# Patient Record
Sex: FEMALE | Race: WHITE | Employment: UNEMPLOYED | URBAN - METROPOLITAN AREA
[De-identification: names, ages, dates, MRNs, and addresses within clinical notes are randomized per-mention and may not be internally consistent; named-entity substitution may affect disease eponyms.]

---

## 2017-01-01 ENCOUNTER — APPOINTMENT (OUTPATIENT)
Dept: GENERAL RADIOLOGY | Age: 62
DRG: 291 | End: 2017-01-01
Attending: EMERGENCY MEDICINE
Payer: COMMERCIAL

## 2017-01-01 ENCOUNTER — APPOINTMENT (OUTPATIENT)
Dept: GENERAL RADIOLOGY | Age: 62
DRG: 291 | End: 2017-01-01
Attending: RADIOLOGY
Payer: COMMERCIAL

## 2017-01-01 ENCOUNTER — HOSPITAL ENCOUNTER (INPATIENT)
Age: 62
LOS: 9 days | DRG: 291 | End: 2017-10-23
Attending: EMERGENCY MEDICINE | Admitting: HOSPITALIST
Payer: COMMERCIAL

## 2017-01-01 ENCOUNTER — APPOINTMENT (OUTPATIENT)
Dept: GENERAL RADIOLOGY | Age: 62
DRG: 291 | End: 2017-01-01
Attending: INTERNAL MEDICINE
Payer: COMMERCIAL

## 2017-01-01 ENCOUNTER — APPOINTMENT (OUTPATIENT)
Dept: ULTRASOUND IMAGING | Age: 62
DRG: 291 | End: 2017-01-01
Attending: EMERGENCY MEDICINE
Payer: COMMERCIAL

## 2017-01-01 ENCOUNTER — APPOINTMENT (OUTPATIENT)
Dept: GENERAL RADIOLOGY | Age: 62
DRG: 291 | End: 2017-01-01
Attending: HOSPITALIST
Payer: COMMERCIAL

## 2017-01-01 ENCOUNTER — APPOINTMENT (OUTPATIENT)
Dept: NUCLEAR MEDICINE | Age: 62
DRG: 291 | End: 2017-01-01
Attending: FAMILY MEDICINE
Payer: COMMERCIAL

## 2017-01-01 ENCOUNTER — PATIENT OUTREACH (OUTPATIENT)
Dept: CARDIOLOGY CLINIC | Age: 62
End: 2017-01-01

## 2017-01-01 ENCOUNTER — APPOINTMENT (OUTPATIENT)
Dept: CT IMAGING | Age: 62
DRG: 291 | End: 2017-01-01
Attending: FAMILY MEDICINE
Payer: COMMERCIAL

## 2017-01-01 ENCOUNTER — APPOINTMENT (OUTPATIENT)
Dept: ULTRASOUND IMAGING | Age: 62
DRG: 291 | End: 2017-01-01
Attending: HOSPITALIST
Payer: COMMERCIAL

## 2017-01-01 ENCOUNTER — HOSPITAL ENCOUNTER (INPATIENT)
Age: 62
LOS: 6 days | Discharge: NURSING, REHAB OR LONG-TERM CARE | DRG: 291 | End: 2017-09-20
Attending: EMERGENCY MEDICINE | Admitting: FAMILY MEDICINE
Payer: COMMERCIAL

## 2017-01-01 ENCOUNTER — APPOINTMENT (OUTPATIENT)
Dept: INTERVENTIONAL RADIOLOGY/VASCULAR | Age: 62
DRG: 291 | End: 2017-01-01
Attending: INTERNAL MEDICINE
Payer: COMMERCIAL

## 2017-01-01 VITALS
TEMPERATURE: 94.4 F | WEIGHT: 293 LBS | SYSTOLIC BLOOD PRESSURE: 77 MMHG | HEIGHT: 67 IN | BODY MASS INDEX: 45.99 KG/M2 | OXYGEN SATURATION: 83 % | RESPIRATION RATE: 17 BRPM | DIASTOLIC BLOOD PRESSURE: 50 MMHG | HEART RATE: 62 BPM

## 2017-01-01 VITALS
BODY MASS INDEX: 45.99 KG/M2 | OXYGEN SATURATION: 94 % | TEMPERATURE: 96.9 F | DIASTOLIC BLOOD PRESSURE: 88 MMHG | SYSTOLIC BLOOD PRESSURE: 146 MMHG | RESPIRATION RATE: 21 BRPM | HEIGHT: 67 IN | HEART RATE: 60 BPM | WEIGHT: 293 LBS

## 2017-01-01 DIAGNOSIS — R06.02 SOB (SHORTNESS OF BREATH): ICD-10-CM

## 2017-01-01 DIAGNOSIS — J44.9 CHRONIC OBSTRUCTIVE PULMONARY DISEASE, UNSPECIFIED COPD TYPE (HCC): ICD-10-CM

## 2017-01-01 DIAGNOSIS — R53.83 FATIGUE, UNSPECIFIED TYPE: ICD-10-CM

## 2017-01-01 DIAGNOSIS — I42.9 CONGESTIVE HEART FAILURE WITH CARDIOMYOPATHY AND CARDIOMEGALY (HCC): ICD-10-CM

## 2017-01-01 DIAGNOSIS — I50.23 ACUTE ON CHRONIC SYSTOLIC CONGESTIVE HEART FAILURE (HCC): ICD-10-CM

## 2017-01-01 DIAGNOSIS — I50.30 DIASTOLIC CONGESTIVE HEART FAILURE, UNSPECIFIED CONGESTIVE HEART FAILURE CHRONICITY: Primary | ICD-10-CM

## 2017-01-01 DIAGNOSIS — I50.9 CONGESTIVE HEART FAILURE WITH CARDIOMYOPATHY AND CARDIOMEGALY (HCC): ICD-10-CM

## 2017-01-01 DIAGNOSIS — I47.9 PAROXYSMAL TACHYCARDIA (HCC): Primary | ICD-10-CM

## 2017-01-01 DIAGNOSIS — E66.2 MORBID OBESITY WITH ALVEOLAR HYPOVENTILATION (HCC): ICD-10-CM

## 2017-01-01 DIAGNOSIS — R53.81 PHYSICAL DEBILITY: ICD-10-CM

## 2017-01-01 DIAGNOSIS — I47.29 NON-SUSTAINED VENTRICULAR TACHYCARDIA: ICD-10-CM

## 2017-01-01 DIAGNOSIS — R18.8 OTHER ASCITES: ICD-10-CM

## 2017-01-01 DIAGNOSIS — F41.9 ANXIETY: ICD-10-CM

## 2017-01-01 DIAGNOSIS — I51.7 CONGESTIVE HEART FAILURE WITH CARDIOMYOPATHY AND CARDIOMEGALY (HCC): ICD-10-CM

## 2017-01-01 LAB
ALBUMIN SERPL-MCNC: 3 G/DL (ref 3.5–5)
ALBUMIN SERPL-MCNC: 3.1 G/DL (ref 3.5–5)
ALBUMIN SERPL-MCNC: 3.2 G/DL (ref 3.5–5)
ALBUMIN SERPL-MCNC: 3.6 G/DL (ref 3.5–5)
ALBUMIN SERPL-MCNC: 3.8 G/DL (ref 3.5–5)
ALBUMIN/GLOB SERPL: 0.9 {RATIO} (ref 1.1–2.2)
ALBUMIN/GLOB SERPL: 1 {RATIO} (ref 1.1–2.2)
ALP SERPL-CCNC: 27 U/L (ref 45–117)
ALP SERPL-CCNC: 29 U/L (ref 45–117)
ALP SERPL-CCNC: 32 U/L (ref 45–117)
ALP SERPL-CCNC: 32 U/L (ref 45–117)
ALP SERPL-CCNC: 35 U/L (ref 45–117)
ALT SERPL-CCNC: 20 U/L (ref 12–78)
ALT SERPL-CCNC: 21 U/L (ref 12–78)
ALT SERPL-CCNC: 27 U/L (ref 12–78)
ALT SERPL-CCNC: 29 U/L (ref 12–78)
ALT SERPL-CCNC: 34 U/L (ref 12–78)
ANION GAP SERPL CALC-SCNC: 10 MMOL/L (ref 5–15)
ANION GAP SERPL CALC-SCNC: 11 MMOL/L (ref 5–15)
ANION GAP SERPL CALC-SCNC: 11 MMOL/L (ref 5–15)
ANION GAP SERPL CALC-SCNC: 13 MMOL/L (ref 5–15)
ANION GAP SERPL CALC-SCNC: 13 MMOL/L (ref 5–15)
ANION GAP SERPL CALC-SCNC: 15 MMOL/L (ref 5–15)
ANION GAP SERPL CALC-SCNC: 6 MMOL/L (ref 5–15)
ANION GAP SERPL CALC-SCNC: 6 MMOL/L (ref 5–15)
ANION GAP SERPL CALC-SCNC: 7 MMOL/L (ref 5–15)
ANION GAP SERPL CALC-SCNC: 7 MMOL/L (ref 5–15)
ANION GAP SERPL CALC-SCNC: 8 MMOL/L (ref 5–15)
ANION GAP SERPL CALC-SCNC: 9 MMOL/L (ref 5–15)
ANION GAP SERPL CALC-SCNC: 9 MMOL/L (ref 5–15)
ARTERIAL PATENCY WRIST A: ABNORMAL
ARTERIAL PATENCY WRIST A: YES
AST SERPL-CCNC: 17 U/L (ref 15–37)
AST SERPL-CCNC: 19 U/L (ref 15–37)
AST SERPL-CCNC: 27 U/L (ref 15–37)
AST SERPL-CCNC: 33 U/L (ref 15–37)
AST SERPL-CCNC: 37 U/L (ref 15–37)
ATRIAL RATE: 127 BPM
ATRIAL RATE: 79 BPM
ATRIAL RATE: 93 BPM
BACTERIA SPEC CULT: NORMAL
BASE EXCESS BLD CALC-SCNC: 12 MMOL/L
BASE EXCESS BLD CALC-SCNC: 16 MMOL/L
BASE EXCESS BLD CALC-SCNC: 20 MMOL/L
BASE EXCESS BLD CALC-SCNC: 26 MMOL/L
BASE EXCESS BLD CALC-SCNC: 26 MMOL/L
BASOPHILS # BLD: 0 K/UL (ref 0–0.1)
BASOPHILS NFR BLD: 0 % (ref 0–1)
BDY SITE: ABNORMAL
BILIRUB SERPL-MCNC: 1.6 MG/DL (ref 0.2–1)
BILIRUB SERPL-MCNC: 1.7 MG/DL (ref 0.2–1)
BILIRUB SERPL-MCNC: 2.9 MG/DL (ref 0.2–1)
BILIRUB SERPL-MCNC: 3.7 MG/DL (ref 0.2–1)
BILIRUB SERPL-MCNC: 4.6 MG/DL (ref 0.2–1)
BNP SERPL-MCNC: 9210 PG/ML (ref 0–125)
BNP SERPL-MCNC: ABNORMAL PG/ML (ref 0–125)
BNP SERPL-MCNC: ABNORMAL PG/ML (ref 0–125)
BUN SERPL-MCNC: 104 MG/DL (ref 6–20)
BUN SERPL-MCNC: 113 MG/DL (ref 6–20)
BUN SERPL-MCNC: 117 MG/DL (ref 6–20)
BUN SERPL-MCNC: 125 MG/DL (ref 6–20)
BUN SERPL-MCNC: 37 MG/DL (ref 6–20)
BUN SERPL-MCNC: 38 MG/DL (ref 6–20)
BUN SERPL-MCNC: 43 MG/DL (ref 6–20)
BUN SERPL-MCNC: 47 MG/DL (ref 6–20)
BUN SERPL-MCNC: 50 MG/DL (ref 6–20)
BUN SERPL-MCNC: 52 MG/DL (ref 6–20)
BUN SERPL-MCNC: 56 MG/DL (ref 6–20)
BUN SERPL-MCNC: 58 MG/DL (ref 6–20)
BUN SERPL-MCNC: 66 MG/DL (ref 6–20)
BUN SERPL-MCNC: 68 MG/DL (ref 6–20)
BUN SERPL-MCNC: 70 MG/DL (ref 6–20)
BUN SERPL-MCNC: 85 MG/DL (ref 6–20)
BUN SERPL-MCNC: 99 MG/DL (ref 6–20)
BUN/CREAT SERPL: 20 (ref 12–20)
BUN/CREAT SERPL: 20 (ref 12–20)
BUN/CREAT SERPL: 22 (ref 12–20)
BUN/CREAT SERPL: 22 (ref 12–20)
BUN/CREAT SERPL: 23 (ref 12–20)
BUN/CREAT SERPL: 24 (ref 12–20)
BUN/CREAT SERPL: 26 (ref 12–20)
BUN/CREAT SERPL: 28 (ref 12–20)
BUN/CREAT SERPL: 29 (ref 12–20)
BUN/CREAT SERPL: 30 (ref 12–20)
BUN/CREAT SERPL: 31 (ref 12–20)
BUN/CREAT SERPL: 31 (ref 12–20)
BUN/CREAT SERPL: 34 (ref 12–20)
CALCIUM SERPL-MCNC: 8.3 MG/DL (ref 8.5–10.1)
CALCIUM SERPL-MCNC: 8.4 MG/DL (ref 8.5–10.1)
CALCIUM SERPL-MCNC: 8.5 MG/DL (ref 8.5–10.1)
CALCIUM SERPL-MCNC: 8.5 MG/DL (ref 8.5–10.1)
CALCIUM SERPL-MCNC: 8.6 MG/DL (ref 8.5–10.1)
CALCIUM SERPL-MCNC: 8.6 MG/DL (ref 8.5–10.1)
CALCIUM SERPL-MCNC: 8.7 MG/DL (ref 8.5–10.1)
CALCIUM SERPL-MCNC: 8.7 MG/DL (ref 8.5–10.1)
CALCIUM SERPL-MCNC: 8.8 MG/DL (ref 8.5–10.1)
CALCIUM SERPL-MCNC: 8.9 MG/DL (ref 8.5–10.1)
CALCIUM SERPL-MCNC: 9.1 MG/DL (ref 8.5–10.1)
CALCIUM SERPL-MCNC: 9.1 MG/DL (ref 8.5–10.1)
CALCIUM SERPL-MCNC: 9.2 MG/DL (ref 8.5–10.1)
CALCIUM SERPL-MCNC: 9.2 MG/DL (ref 8.5–10.1)
CALCIUM SERPL-MCNC: 9.3 MG/DL (ref 8.5–10.1)
CALCIUM SERPL-MCNC: 9.3 MG/DL (ref 8.5–10.1)
CALCIUM SERPL-MCNC: 9.4 MG/DL (ref 8.5–10.1)
CALCULATED P AXIS, ECG09: 103 DEGREES
CALCULATED P AXIS, ECG09: 54 DEGREES
CALCULATED R AXIS, ECG10: -136 DEGREES
CALCULATED R AXIS, ECG10: -62 DEGREES
CALCULATED R AXIS, ECG10: -84 DEGREES
CALCULATED T AXIS, ECG11: 101 DEGREES
CALCULATED T AXIS, ECG11: 65 DEGREES
CALCULATED T AXIS, ECG11: 95 DEGREES
CHLORIDE SERPL-SCNC: 87 MMOL/L (ref 97–108)
CHLORIDE SERPL-SCNC: 88 MMOL/L (ref 97–108)
CHLORIDE SERPL-SCNC: 90 MMOL/L (ref 97–108)
CHLORIDE SERPL-SCNC: 91 MMOL/L (ref 97–108)
CHLORIDE SERPL-SCNC: 92 MMOL/L (ref 97–108)
CHLORIDE SERPL-SCNC: 94 MMOL/L (ref 97–108)
CHLORIDE SERPL-SCNC: 96 MMOL/L (ref 97–108)
CHLORIDE SERPL-SCNC: 96 MMOL/L (ref 97–108)
CHLORIDE SERPL-SCNC: 97 MMOL/L (ref 97–108)
CHLORIDE SERPL-SCNC: 99 MMOL/L (ref 97–108)
CK SERPL-CCNC: 83 U/L (ref 26–192)
CO2 SERPL-SCNC: 26 MMOL/L (ref 21–32)
CO2 SERPL-SCNC: 29 MMOL/L (ref 21–32)
CO2 SERPL-SCNC: 33 MMOL/L (ref 21–32)
CO2 SERPL-SCNC: 34 MMOL/L (ref 21–32)
CO2 SERPL-SCNC: 34 MMOL/L (ref 21–32)
CO2 SERPL-SCNC: 36 MMOL/L (ref 21–32)
CO2 SERPL-SCNC: 37 MMOL/L (ref 21–32)
CO2 SERPL-SCNC: 38 MMOL/L (ref 21–32)
CO2 SERPL-SCNC: 39 MMOL/L (ref 21–32)
CO2 SERPL-SCNC: 44 MMOL/L (ref 21–32)
CORTIS SERPL-MCNC: 62.8 UG/DL
CREAT SERPL-MCNC: 1.53 MG/DL (ref 0.55–1.02)
CREAT SERPL-MCNC: 1.57 MG/DL (ref 0.55–1.02)
CREAT SERPL-MCNC: 1.61 MG/DL (ref 0.55–1.02)
CREAT SERPL-MCNC: 1.64 MG/DL (ref 0.55–1.02)
CREAT SERPL-MCNC: 1.72 MG/DL (ref 0.55–1.02)
CREAT SERPL-MCNC: 1.87 MG/DL (ref 0.55–1.02)
CREAT SERPL-MCNC: 1.88 MG/DL (ref 0.55–1.02)
CREAT SERPL-MCNC: 1.98 MG/DL (ref 0.55–1.02)
CREAT SERPL-MCNC: 2.06 MG/DL (ref 0.55–1.02)
CREAT SERPL-MCNC: 2.16 MG/DL (ref 0.55–1.02)
CREAT SERPL-MCNC: 2.79 MG/DL (ref 0.55–1.02)
CREAT SERPL-MCNC: 3.28 MG/DL (ref 0.55–1.02)
CREAT SERPL-MCNC: 4.13 MG/DL (ref 0.55–1.02)
CREAT SERPL-MCNC: 5.05 MG/DL (ref 0.55–1.02)
CREAT SERPL-MCNC: 5.08 MG/DL (ref 0.55–1.02)
CREAT SERPL-MCNC: 5.22 MG/DL (ref 0.55–1.02)
CREAT SERPL-MCNC: 6.17 MG/DL (ref 0.55–1.02)
DIAGNOSIS, 93000: NORMAL
DIFFERENTIAL METHOD BLD: ABNORMAL
EOSINOPHIL # BLD: 0 K/UL (ref 0–0.4)
EOSINOPHIL # BLD: 0.1 K/UL (ref 0–0.4)
EOSINOPHIL # BLD: 0.1 K/UL (ref 0–0.4)
EOSINOPHIL NFR BLD: 0 % (ref 0–7)
EOSINOPHIL NFR BLD: 1 % (ref 0–7)
EOSINOPHIL NFR BLD: 2 % (ref 0–7)
ERYTHROCYTE [DISTWIDTH] IN BLOOD BY AUTOMATED COUNT: 17.9 % (ref 11.5–14.5)
ERYTHROCYTE [DISTWIDTH] IN BLOOD BY AUTOMATED COUNT: 18 % (ref 11.5–14.5)
ERYTHROCYTE [DISTWIDTH] IN BLOOD BY AUTOMATED COUNT: 18.3 % (ref 11.5–14.5)
ERYTHROCYTE [DISTWIDTH] IN BLOOD BY AUTOMATED COUNT: 18.4 % (ref 11.5–14.5)
ERYTHROCYTE [DISTWIDTH] IN BLOOD BY AUTOMATED COUNT: 18.4 % (ref 11.5–14.5)
ERYTHROCYTE [DISTWIDTH] IN BLOOD BY AUTOMATED COUNT: 18.5 % (ref 11.5–14.5)
ERYTHROCYTE [DISTWIDTH] IN BLOOD BY AUTOMATED COUNT: 18.7 % (ref 11.5–14.5)
ERYTHROCYTE [DISTWIDTH] IN BLOOD BY AUTOMATED COUNT: 18.8 % (ref 11.5–14.5)
ERYTHROCYTE [DISTWIDTH] IN BLOOD BY AUTOMATED COUNT: 18.9 % (ref 11.5–14.5)
ERYTHROCYTE [DISTWIDTH] IN BLOOD BY AUTOMATED COUNT: 19.5 % (ref 11.5–14.5)
ERYTHROCYTE [DISTWIDTH] IN BLOOD BY AUTOMATED COUNT: 20.4 % (ref 11.5–14.5)
GAS FLOW.O2 O2 DELIVERY SYS: ABNORMAL L/MIN
GAS FLOW.O2 SETTING OXYMISER: 5 L/M
GLOBULIN SER CALC-MCNC: 3.4 G/DL (ref 2–4)
GLOBULIN SER CALC-MCNC: 3.6 G/DL (ref 2–4)
GLOBULIN SER CALC-MCNC: 3.6 G/DL (ref 2–4)
GLOBULIN SER CALC-MCNC: 3.7 G/DL (ref 2–4)
GLOBULIN SER CALC-MCNC: 4.3 G/DL (ref 2–4)
GLUCOSE BLD STRIP.AUTO-MCNC: 124 MG/DL (ref 65–100)
GLUCOSE BLD STRIP.AUTO-MCNC: 87 MG/DL (ref 65–100)
GLUCOSE SERPL-MCNC: 110 MG/DL (ref 65–100)
GLUCOSE SERPL-MCNC: 110 MG/DL (ref 65–100)
GLUCOSE SERPL-MCNC: 117 MG/DL (ref 65–100)
GLUCOSE SERPL-MCNC: 117 MG/DL (ref 65–100)
GLUCOSE SERPL-MCNC: 120 MG/DL (ref 65–100)
GLUCOSE SERPL-MCNC: 125 MG/DL (ref 65–100)
GLUCOSE SERPL-MCNC: 127 MG/DL (ref 65–100)
GLUCOSE SERPL-MCNC: 132 MG/DL (ref 65–100)
GLUCOSE SERPL-MCNC: 133 MG/DL (ref 65–100)
GLUCOSE SERPL-MCNC: 137 MG/DL (ref 65–100)
GLUCOSE SERPL-MCNC: 138 MG/DL (ref 65–100)
GLUCOSE SERPL-MCNC: 138 MG/DL (ref 65–100)
GLUCOSE SERPL-MCNC: 139 MG/DL (ref 65–100)
GLUCOSE SERPL-MCNC: 144 MG/DL (ref 65–100)
GLUCOSE SERPL-MCNC: 146 MG/DL (ref 65–100)
GLUCOSE SERPL-MCNC: 93 MG/DL (ref 65–100)
GLUCOSE SERPL-MCNC: 95 MG/DL (ref 65–100)
HBV SURFACE AG SER QL: <0.1 INDEX
HBV SURFACE AG SER QL: NEGATIVE
HCO3 BLD-SCNC: 37.2 MMOL/L (ref 22–26)
HCO3 BLD-SCNC: 40.8 MMOL/L (ref 22–26)
HCO3 BLD-SCNC: 44.7 MMOL/L (ref 22–26)
HCO3 BLD-SCNC: 49.4 MMOL/L (ref 22–26)
HCO3 BLD-SCNC: 50 MMOL/L (ref 22–26)
HCT VFR BLD AUTO: 35.1 % (ref 35–47)
HCT VFR BLD AUTO: 37.8 % (ref 35–47)
HCT VFR BLD AUTO: 38.1 % (ref 35–47)
HCT VFR BLD AUTO: 38.8 % (ref 35–47)
HCT VFR BLD AUTO: 39 % (ref 35–47)
HCT VFR BLD AUTO: 39.6 % (ref 35–47)
HCT VFR BLD AUTO: 39.6 % (ref 35–47)
HCT VFR BLD AUTO: 41.1 % (ref 35–47)
HCT VFR BLD AUTO: 41.5 % (ref 35–47)
HCT VFR BLD AUTO: 42.1 % (ref 35–47)
HCT VFR BLD AUTO: 45.1 % (ref 35–47)
HGB BLD-MCNC: 11.9 G/DL (ref 11.5–16)
HGB BLD-MCNC: 12 G/DL (ref 11.5–16)
HGB BLD-MCNC: 12.1 G/DL (ref 11.5–16)
HGB BLD-MCNC: 12.2 G/DL (ref 11.5–16)
HGB BLD-MCNC: 12.5 G/DL (ref 11.5–16)
HGB BLD-MCNC: 12.9 G/DL (ref 11.5–16)
HGB BLD-MCNC: 12.9 G/DL (ref 11.5–16)
HGB BLD-MCNC: 13 G/DL (ref 11.5–16)
HGB BLD-MCNC: 13 G/DL (ref 11.5–16)
HGB BLD-MCNC: 13.1 G/DL (ref 11.5–16)
HGB BLD-MCNC: 14.1 G/DL (ref 11.5–16)
LACTATE SERPL-SCNC: 1.4 MMOL/L (ref 0.4–2)
LACTATE SERPL-SCNC: 1.5 MMOL/L (ref 0.4–2)
LACTATE SERPL-SCNC: 2.8 MMOL/L (ref 0.4–2)
LYMPHOCYTES # BLD: 0.2 K/UL (ref 0.8–3.5)
LYMPHOCYTES # BLD: 0.2 K/UL (ref 0.8–3.5)
LYMPHOCYTES # BLD: 0.3 K/UL (ref 0.8–3.5)
LYMPHOCYTES # BLD: 0.3 K/UL (ref 0.8–3.5)
LYMPHOCYTES # BLD: 0.7 K/UL (ref 0.8–3.5)
LYMPHOCYTES # BLD: 1.2 K/UL (ref 0.8–3.5)
LYMPHOCYTES NFR BLD: 11 % (ref 12–49)
LYMPHOCYTES NFR BLD: 16 % (ref 12–49)
LYMPHOCYTES NFR BLD: 4 % (ref 12–49)
LYMPHOCYTES NFR BLD: 4 % (ref 12–49)
LYMPHOCYTES NFR BLD: 5 % (ref 12–49)
LYMPHOCYTES NFR BLD: 5 % (ref 12–49)
MAGNESIUM SERPL-MCNC: 1.9 MG/DL (ref 1.6–2.4)
MAGNESIUM SERPL-MCNC: 2.3 MG/DL (ref 1.6–2.4)
MAGNESIUM SERPL-MCNC: 2.4 MG/DL (ref 1.6–2.4)
MAGNESIUM SERPL-MCNC: 2.5 MG/DL (ref 1.6–2.4)
MAGNESIUM SERPL-MCNC: 2.6 MG/DL (ref 1.6–2.4)
MCH RBC QN AUTO: 29.7 PG (ref 26–34)
MCH RBC QN AUTO: 29.8 PG (ref 26–34)
MCH RBC QN AUTO: 29.9 PG (ref 26–34)
MCH RBC QN AUTO: 30 PG (ref 26–34)
MCH RBC QN AUTO: 30.2 PG (ref 26–34)
MCH RBC QN AUTO: 30.2 PG (ref 26–34)
MCH RBC QN AUTO: 30.6 PG (ref 26–34)
MCH RBC QN AUTO: 30.6 PG (ref 26–34)
MCH RBC QN AUTO: 30.8 PG (ref 26–34)
MCH RBC QN AUTO: 31.1 PG (ref 26–34)
MCH RBC QN AUTO: 31.2 PG (ref 26–34)
MCHC RBC AUTO-ENTMCNC: 30.9 G/DL (ref 30–36.5)
MCHC RBC AUTO-ENTMCNC: 31.2 G/DL (ref 30–36.5)
MCHC RBC AUTO-ENTMCNC: 31.2 G/DL (ref 30–36.5)
MCHC RBC AUTO-ENTMCNC: 31.3 G/DL (ref 30–36.5)
MCHC RBC AUTO-ENTMCNC: 31.4 G/DL (ref 30–36.5)
MCHC RBC AUTO-ENTMCNC: 31.6 G/DL (ref 30–36.5)
MCHC RBC AUTO-ENTMCNC: 31.6 G/DL (ref 30–36.5)
MCHC RBC AUTO-ENTMCNC: 31.7 G/DL (ref 30–36.5)
MCHC RBC AUTO-ENTMCNC: 32.6 G/DL (ref 30–36.5)
MCHC RBC AUTO-ENTMCNC: 33.3 G/DL (ref 30–36.5)
MCHC RBC AUTO-ENTMCNC: 34.8 G/DL (ref 30–36.5)
MCV RBC AUTO: 89.8 FL (ref 80–99)
MCV RBC AUTO: 90.7 FL (ref 80–99)
MCV RBC AUTO: 94.7 FL (ref 80–99)
MCV RBC AUTO: 95 FL (ref 80–99)
MCV RBC AUTO: 95.2 FL (ref 80–99)
MCV RBC AUTO: 95.4 FL (ref 80–99)
MCV RBC AUTO: 95.6 FL (ref 80–99)
MCV RBC AUTO: 95.7 FL (ref 80–99)
MCV RBC AUTO: 97.4 FL (ref 80–99)
MCV RBC AUTO: 97.8 FL (ref 80–99)
MCV RBC AUTO: 99.1 FL (ref 80–99)
MONOCYTES # BLD: 0.2 K/UL (ref 0–1)
MONOCYTES # BLD: 0.3 K/UL (ref 0–1)
MONOCYTES # BLD: 0.8 K/UL (ref 0–1)
MONOCYTES NFR BLD: 11 % (ref 5–13)
MONOCYTES NFR BLD: 3 % (ref 5–13)
MONOCYTES NFR BLD: 5 % (ref 5–13)
MONOCYTES NFR BLD: 5 % (ref 5–13)
MONOCYTES NFR BLD: 6 % (ref 5–13)
MONOCYTES NFR BLD: 6 % (ref 5–13)
NEUTS BAND NFR BLD MANUAL: 7 % (ref 0–6)
NEUTS SEG # BLD: 4.7 K/UL (ref 1.8–8)
NEUTS SEG # BLD: 5 K/UL (ref 1.8–8)
NEUTS SEG # BLD: 5.2 K/UL (ref 1.8–8)
NEUTS SEG # BLD: 5.6 K/UL (ref 1.8–8)
NEUTS SEG # BLD: 5.7 K/UL (ref 1.8–8)
NEUTS SEG # BLD: 5.7 K/UL (ref 1.8–8)
NEUTS SEG NFR BLD: 72 % (ref 32–75)
NEUTS SEG NFR BLD: 82 % (ref 32–75)
NEUTS SEG NFR BLD: 85 % (ref 32–75)
NEUTS SEG NFR BLD: 89 % (ref 32–75)
NEUTS SEG NFR BLD: 90 % (ref 32–75)
NEUTS SEG NFR BLD: 91 % (ref 32–75)
NRBC # BLD: 0.61 K/UL (ref 0–0.01)
NRBC # BLD: 0.65 K/UL (ref 0–0.01)
NRBC BLD-RTO: 7 PER 100 WBC
NRBC BLD-RTO: 9.9 PER 100 WBC
O2/TOTAL GAS SETTING VFR VENT: 50 %
P-R INTERVAL, ECG05: 198 MS
P-R INTERVAL, ECG05: 294 MS
PCO2 BLD: 64.9 MMHG (ref 35–45)
PCO2 BLD: 67.2 MMHG (ref 35–45)
PCO2 BLD: 68.6 MMHG (ref 35–45)
PCO2 BLD: 69.2 MMHG (ref 35–45)
PCO2 BLD: 73.7 MMHG (ref 35–45)
PEEP RESPIRATORY: 6 CMH2O
PH BLD: 7.37 [PH] (ref 7.35–7.45)
PH BLD: 7.38 [PH] (ref 7.35–7.45)
PH BLD: 7.39 [PH] (ref 7.35–7.45)
PH BLD: 7.47 [PH] (ref 7.35–7.45)
PH BLD: 7.47 [PH] (ref 7.35–7.45)
PHOSPHATE SERPL-MCNC: 4.5 MG/DL (ref 2.6–4.7)
PHOSPHATE SERPL-MCNC: 6.2 MG/DL (ref 2.6–4.7)
PHOSPHATE SERPL-MCNC: 6.3 MG/DL (ref 2.6–4.7)
PHOSPHATE SERPL-MCNC: 6.4 MG/DL (ref 2.6–4.7)
PHOSPHATE SERPL-MCNC: 7 MG/DL (ref 2.6–4.7)
PHOSPHATE SERPL-MCNC: 7.4 MG/DL (ref 2.6–4.7)
PIP ISTAT,IPIP: 12
PIP ISTAT,IPIP: 13
PLATELET # BLD AUTO: 125 K/UL (ref 150–400)
PLATELET # BLD AUTO: 143 K/UL (ref 150–400)
PLATELET # BLD AUTO: 143 K/UL (ref 150–400)
PLATELET # BLD AUTO: 146 K/UL (ref 150–400)
PLATELET # BLD AUTO: 151 K/UL (ref 150–400)
PLATELET # BLD AUTO: 169 K/UL (ref 150–400)
PLATELET # BLD AUTO: 172 K/UL (ref 150–400)
PLATELET # BLD AUTO: 173 K/UL (ref 150–400)
PLATELET # BLD AUTO: 185 K/UL (ref 150–400)
PLATELET # BLD AUTO: 194 K/UL (ref 150–400)
PLATELET # BLD AUTO: 93 K/UL (ref 150–400)
PO2 BLD: 110 MMHG (ref 80–100)
PO2 BLD: 64 MMHG (ref 80–100)
PO2 BLD: 75 MMHG (ref 80–100)
PO2 BLD: 81 MMHG (ref 80–100)
PO2 BLD: 92 MMHG (ref 80–100)
POTASSIUM SERPL-SCNC: 2.9 MMOL/L (ref 3.5–5.1)
POTASSIUM SERPL-SCNC: 3.4 MMOL/L (ref 3.5–5.1)
POTASSIUM SERPL-SCNC: 3.5 MMOL/L (ref 3.5–5.1)
POTASSIUM SERPL-SCNC: 3.8 MMOL/L (ref 3.5–5.1)
POTASSIUM SERPL-SCNC: 3.9 MMOL/L (ref 3.5–5.1)
POTASSIUM SERPL-SCNC: 4.1 MMOL/L (ref 3.5–5.1)
POTASSIUM SERPL-SCNC: 4.2 MMOL/L (ref 3.5–5.1)
POTASSIUM SERPL-SCNC: 4.5 MMOL/L (ref 3.5–5.1)
POTASSIUM SERPL-SCNC: 4.5 MMOL/L (ref 3.5–5.1)
POTASSIUM SERPL-SCNC: 4.6 MMOL/L (ref 3.5–5.1)
POTASSIUM SERPL-SCNC: 4.6 MMOL/L (ref 3.5–5.1)
POTASSIUM SERPL-SCNC: 4.7 MMOL/L (ref 3.5–5.1)
POTASSIUM SERPL-SCNC: 4.9 MMOL/L (ref 3.5–5.1)
POTASSIUM SERPL-SCNC: 5.2 MMOL/L (ref 3.5–5.1)
POTASSIUM SERPL-SCNC: 5.2 MMOL/L (ref 3.5–5.1)
POTASSIUM SERPL-SCNC: 5.3 MMOL/L (ref 3.5–5.1)
POTASSIUM SERPL-SCNC: 5.6 MMOL/L (ref 3.5–5.1)
PRESSURE SUPPORT SETTING VENT: 12 CMH2O
PRESSURE SUPPORT SETTING VENT: 6 CMH2O
PRESSURE SUPPORT SETTING VENT: 6 CMH2O
PROT SERPL-MCNC: 6.4 G/DL (ref 6.4–8.2)
PROT SERPL-MCNC: 6.7 G/DL (ref 6.4–8.2)
PROT SERPL-MCNC: 6.9 G/DL (ref 6.4–8.2)
PROT SERPL-MCNC: 7.2 G/DL (ref 6.4–8.2)
PROT SERPL-MCNC: 8.1 G/DL (ref 6.4–8.2)
Q-T INTERVAL, ECG07: 364 MS
Q-T INTERVAL, ECG07: 400 MS
Q-T INTERVAL, ECG07: 454 MS
QRS DURATION, ECG06: 148 MS
QRS DURATION, ECG06: 164 MS
QRS DURATION, ECG06: 188 MS
QTC CALCULATION (BEZET), ECG08: 497 MS
QTC CALCULATION (BEZET), ECG08: 517 MS
QTC CALCULATION (BEZET), ECG08: 518 MS
RBC # BLD AUTO: 3.91 M/UL (ref 3.8–5.2)
RBC # BLD AUTO: 3.98 M/UL (ref 3.8–5.2)
RBC # BLD AUTO: 3.98 M/UL (ref 3.8–5.2)
RBC # BLD AUTO: 4.06 M/UL (ref 3.8–5.2)
RBC # BLD AUTO: 4.15 M/UL (ref 3.8–5.2)
RBC # BLD AUTO: 4.16 M/UL (ref 3.8–5.2)
RBC # BLD AUTO: 4.25 M/UL (ref 3.8–5.2)
RBC # BLD AUTO: 4.26 M/UL (ref 3.8–5.2)
RBC # BLD AUTO: 4.3 M/UL (ref 3.8–5.2)
RBC # BLD AUTO: 4.34 M/UL (ref 3.8–5.2)
RBC # BLD AUTO: 4.61 M/UL (ref 3.8–5.2)
RBC MORPH BLD: ABNORMAL
SAO2 % BLD: 90 % (ref 92–97)
SAO2 % BLD: 94 % (ref 92–97)
SAO2 % BLD: 95 % (ref 92–97)
SAO2 % BLD: 97 % (ref 92–97)
SAO2 % BLD: 98 % (ref 92–97)
SERVICE CMNT-IMP: 50 L/MIN
SERVICE CMNT-IMP: ABNORMAL
SERVICE CMNT-IMP: NORMAL
SERVICE CMNT-IMP: NORMAL
SODIUM SERPL-SCNC: 131 MMOL/L (ref 136–145)
SODIUM SERPL-SCNC: 133 MMOL/L (ref 136–145)
SODIUM SERPL-SCNC: 138 MMOL/L (ref 136–145)
SODIUM SERPL-SCNC: 139 MMOL/L (ref 136–145)
SODIUM SERPL-SCNC: 140 MMOL/L (ref 136–145)
SODIUM SERPL-SCNC: 141 MMOL/L (ref 136–145)
SODIUM SERPL-SCNC: 143 MMOL/L (ref 136–145)
SPECIMEN TYPE: ABNORMAL
SPONTANEOUS TIMED, IST: YES
SPONTANEOUS TIMED, IST: YES
TOTAL RESP. RATE, ITRR: 26
TROPONIN I SERPL-MCNC: 0.06 NG/ML
TROPONIN I SERPL-MCNC: 0.07 NG/ML
TSH SERPL DL<=0.05 MIU/L-ACNC: 0.51 UIU/ML (ref 0.36–3.74)
VANCOMYCIN SERPL-MCNC: 17.8 UG/ML
VENTRICULAR RATE, ECG03: 122 BPM
VENTRICULAR RATE, ECG03: 78 BPM
VENTRICULAR RATE, ECG03: 93 BPM
WBC # BLD AUTO: 5.2 K/UL (ref 3.6–11)
WBC # BLD AUTO: 5.3 K/UL (ref 3.6–11)
WBC # BLD AUTO: 5.5 K/UL (ref 3.6–11)
WBC # BLD AUTO: 5.7 K/UL (ref 3.6–11)
WBC # BLD AUTO: 6.1 K/UL (ref 3.6–11)
WBC # BLD AUTO: 6.2 K/UL (ref 3.6–11)
WBC # BLD AUTO: 6.8 K/UL (ref 3.6–11)
WBC # BLD AUTO: 7.7 K/UL (ref 3.6–11)
WBC # BLD AUTO: 8.3 K/UL (ref 3.6–11)
WBC # BLD AUTO: 8.9 K/UL (ref 3.6–11)
WBC # BLD AUTO: 9.3 K/UL (ref 3.6–11)
WBC NRBC COR # BLD: ABNORMAL 10*3/UL
WBC NRBC COR # BLD: ABNORMAL 10*3/UL

## 2017-01-01 PROCEDURE — 36415 COLL VENOUS BLD VENIPUNCTURE: CPT | Performed by: FAMILY MEDICINE

## 2017-01-01 PROCEDURE — 77030013033 HC MSK BPAP/CPAP MMKA -B

## 2017-01-01 PROCEDURE — 74011250636 HC RX REV CODE- 250/636: Performed by: HOSPITALIST

## 2017-01-01 PROCEDURE — 83735 ASSAY OF MAGNESIUM: CPT | Performed by: HOSPITALIST

## 2017-01-01 PROCEDURE — 74011250636 HC RX REV CODE- 250/636: Performed by: INTERNAL MEDICINE

## 2017-01-01 PROCEDURE — 74011250636 HC RX REV CODE- 250/636: Performed by: FAMILY MEDICINE

## 2017-01-01 PROCEDURE — 51702 INSERT TEMP BLADDER CATH: CPT

## 2017-01-01 PROCEDURE — 84100 ASSAY OF PHOSPHORUS: CPT | Performed by: HOSPITALIST

## 2017-01-01 PROCEDURE — C1892 INTRO/SHEATH,FIXED,PEEL-AWAY: HCPCS

## 2017-01-01 PROCEDURE — 74011250637 HC RX REV CODE- 250/637: Performed by: HOSPITALIST

## 2017-01-01 PROCEDURE — G8988 SELF CARE GOAL STATUS: HCPCS

## 2017-01-01 PROCEDURE — 82550 ASSAY OF CK (CPK): CPT | Performed by: EMERGENCY MEDICINE

## 2017-01-01 PROCEDURE — 77030011256 HC DRSG MEPILEX <16IN NO BORD MOLN -A

## 2017-01-01 PROCEDURE — 74011000258 HC RX REV CODE- 258: Performed by: HOSPITALIST

## 2017-01-01 PROCEDURE — 74011000250 HC RX REV CODE- 250: Performed by: HOSPITALIST

## 2017-01-01 PROCEDURE — 74011250637 HC RX REV CODE- 250/637: Performed by: INTERNAL MEDICINE

## 2017-01-01 PROCEDURE — G8979 MOBILITY GOAL STATUS: HCPCS

## 2017-01-01 PROCEDURE — 94640 AIRWAY INHALATION TREATMENT: CPT

## 2017-01-01 PROCEDURE — 65660000001 HC RM ICU INTERMED STEPDOWN

## 2017-01-01 PROCEDURE — 36415 COLL VENOUS BLD VENIPUNCTURE: CPT | Performed by: EMERGENCY MEDICINE

## 2017-01-01 PROCEDURE — 80048 BASIC METABOLIC PNL TOTAL CA: CPT | Performed by: HOSPITALIST

## 2017-01-01 PROCEDURE — 74011000250 HC RX REV CODE- 250: Performed by: FAMILY MEDICINE

## 2017-01-01 PROCEDURE — 87040 BLOOD CULTURE FOR BACTERIA: CPT | Performed by: HOSPITALIST

## 2017-01-01 PROCEDURE — 83880 ASSAY OF NATRIURETIC PEPTIDE: CPT | Performed by: INTERNAL MEDICINE

## 2017-01-01 PROCEDURE — 36415 COLL VENOUS BLD VENIPUNCTURE: CPT | Performed by: HOSPITALIST

## 2017-01-01 PROCEDURE — 80053 COMPREHEN METABOLIC PANEL: CPT | Performed by: FAMILY MEDICINE

## 2017-01-01 PROCEDURE — 84484 ASSAY OF TROPONIN QUANT: CPT | Performed by: SPECIALIST

## 2017-01-01 PROCEDURE — P9047 ALBUMIN (HUMAN), 25%, 50ML: HCPCS | Performed by: INTERNAL MEDICINE

## 2017-01-01 PROCEDURE — 74011250636 HC RX REV CODE- 250/636: Performed by: PHYSICIAN ASSISTANT

## 2017-01-01 PROCEDURE — 71010 XR CHEST PORT: CPT

## 2017-01-01 PROCEDURE — 74011250637 HC RX REV CODE- 250/637: Performed by: FAMILY MEDICINE

## 2017-01-01 PROCEDURE — 84100 ASSAY OF PHOSPHORUS: CPT | Performed by: INTERNAL MEDICINE

## 2017-01-01 PROCEDURE — 94660 CPAP INITIATION&MGMT: CPT

## 2017-01-01 PROCEDURE — 74176 CT ABD & PELVIS W/O CONTRAST: CPT

## 2017-01-01 PROCEDURE — 85025 COMPLETE CBC W/AUTO DIFF WBC: CPT | Performed by: INTERNAL MEDICINE

## 2017-01-01 PROCEDURE — 85025 COMPLETE CBC W/AUTO DIFF WBC: CPT | Performed by: EMERGENCY MEDICINE

## 2017-01-01 PROCEDURE — G8987 SELF CARE CURRENT STATUS: HCPCS

## 2017-01-01 PROCEDURE — 84484 ASSAY OF TROPONIN QUANT: CPT | Performed by: EMERGENCY MEDICINE

## 2017-01-01 PROCEDURE — 80202 ASSAY OF VANCOMYCIN: CPT | Performed by: HOSPITALIST

## 2017-01-01 PROCEDURE — 77010033678 HC OXYGEN DAILY

## 2017-01-01 PROCEDURE — A9558 XE133 XENON 10MCI: HCPCS

## 2017-01-01 PROCEDURE — 97530 THERAPEUTIC ACTIVITIES: CPT

## 2017-01-01 PROCEDURE — 76770 US EXAM ABDO BACK WALL COMP: CPT

## 2017-01-01 PROCEDURE — 74011000250 HC RX REV CODE- 250: Performed by: INTERNAL MEDICINE

## 2017-01-01 PROCEDURE — 94761 N-INVAS EAR/PLS OXIMETRY MLT: CPT

## 2017-01-01 PROCEDURE — 77030029684 HC NEB SM VOL KT MONA -A

## 2017-01-01 PROCEDURE — 85025 COMPLETE CBC W/AUTO DIFF WBC: CPT | Performed by: HOSPITALIST

## 2017-01-01 PROCEDURE — 94664 DEMO&/EVAL PT USE INHALER: CPT

## 2017-01-01 PROCEDURE — 76450000000

## 2017-01-01 PROCEDURE — 77030018719 HC DRSG PTCH ANTIMIC J&J -A

## 2017-01-01 PROCEDURE — 83605 ASSAY OF LACTIC ACID: CPT | Performed by: HOSPITALIST

## 2017-01-01 PROCEDURE — 36600 WITHDRAWAL OF ARTERIAL BLOOD: CPT

## 2017-01-01 PROCEDURE — 74011636637 HC RX REV CODE- 636/637: Performed by: HOSPITALIST

## 2017-01-01 PROCEDURE — 74011000250 HC RX REV CODE- 250: Performed by: PHYSICIAN ASSISTANT

## 2017-01-01 PROCEDURE — 85025 COMPLETE CBC W/AUTO DIFF WBC: CPT | Performed by: FAMILY MEDICINE

## 2017-01-01 PROCEDURE — 82803 BLOOD GASES ANY COMBINATION: CPT

## 2017-01-01 PROCEDURE — 85027 COMPLETE CBC AUTOMATED: CPT | Performed by: HOSPITALIST

## 2017-01-01 PROCEDURE — 74011250636 HC RX REV CODE- 250/636: Performed by: EMERGENCY MEDICINE

## 2017-01-01 PROCEDURE — 74011250637 HC RX REV CODE- 250/637: Performed by: EMERGENCY MEDICINE

## 2017-01-01 PROCEDURE — 80048 BASIC METABOLIC PNL TOTAL CA: CPT | Performed by: INTERNAL MEDICINE

## 2017-01-01 PROCEDURE — 65660000000 HC RM CCU STEPDOWN

## 2017-01-01 PROCEDURE — 74011250637 HC RX REV CODE- 250/637: Performed by: SPECIALIST

## 2017-01-01 PROCEDURE — 82962 GLUCOSE BLOOD TEST: CPT

## 2017-01-01 PROCEDURE — 94762 N-INVAS EAR/PLS OXIMTRY CONT: CPT

## 2017-01-01 PROCEDURE — 93005 ELECTROCARDIOGRAM TRACING: CPT

## 2017-01-01 PROCEDURE — 65610000006 HC RM INTENSIVE CARE

## 2017-01-01 PROCEDURE — 80053 COMPREHEN METABOLIC PANEL: CPT | Performed by: EMERGENCY MEDICINE

## 2017-01-01 PROCEDURE — G8978 MOBILITY CURRENT STATUS: HCPCS

## 2017-01-01 PROCEDURE — 36556 INSERT NON-TUNNEL CV CATH: CPT

## 2017-01-01 PROCEDURE — 87340 HEPATITIS B SURFACE AG IA: CPT | Performed by: INTERNAL MEDICINE

## 2017-01-01 PROCEDURE — 36415 COLL VENOUS BLD VENIPUNCTURE: CPT | Performed by: INTERNAL MEDICINE

## 2017-01-01 PROCEDURE — 97165 OT EVAL LOW COMPLEX 30 MIN: CPT

## 2017-01-01 PROCEDURE — 74011250637 HC RX REV CODE- 250/637: Performed by: PHYSICIAN ASSISTANT

## 2017-01-01 PROCEDURE — 87040 BLOOD CULTURE FOR BACTERIA: CPT | Performed by: EMERGENCY MEDICINE

## 2017-01-01 PROCEDURE — 5A2204Z RESTORATION OF CARDIAC RHYTHM, SINGLE: ICD-10-PCS | Performed by: HOSPITALIST

## 2017-01-01 PROCEDURE — 51798 US URINE CAPACITY MEASURE: CPT

## 2017-01-01 PROCEDURE — 83880 ASSAY OF NATRIURETIC PEPTIDE: CPT | Performed by: HOSPITALIST

## 2017-01-01 PROCEDURE — 97161 PT EVAL LOW COMPLEX 20 MIN: CPT

## 2017-01-01 PROCEDURE — 85027 COMPLETE CBC AUTOMATED: CPT | Performed by: INTERNAL MEDICINE

## 2017-01-01 PROCEDURE — 74011000258 HC RX REV CODE- 258: Performed by: EMERGENCY MEDICINE

## 2017-01-01 PROCEDURE — C1752 CATH,HEMODIALYSIS,SHORT-TERM: HCPCS

## 2017-01-01 PROCEDURE — 90935 HEMODIALYSIS ONE EVALUATION: CPT

## 2017-01-01 PROCEDURE — 77030013140 HC MSK NEB VYRM -A

## 2017-01-01 PROCEDURE — 74011000250 HC RX REV CODE- 250: Performed by: RADIOLOGY

## 2017-01-01 PROCEDURE — 80053 COMPREHEN METABOLIC PANEL: CPT | Performed by: HOSPITALIST

## 2017-01-01 PROCEDURE — 84443 ASSAY THYROID STIM HORMONE: CPT | Performed by: PHYSICIAN ASSISTANT

## 2017-01-01 PROCEDURE — 05HM33Z INSERTION OF INFUSION DEVICE INTO RIGHT INTERNAL JUGULAR VEIN, PERCUTANEOUS APPROACH: ICD-10-PCS | Performed by: RADIOLOGY

## 2017-01-01 PROCEDURE — 74011000250 HC RX REV CODE- 250: Performed by: EMERGENCY MEDICINE

## 2017-01-01 PROCEDURE — 76705 ECHO EXAM OF ABDOMEN: CPT

## 2017-01-01 PROCEDURE — 83735 ASSAY OF MAGNESIUM: CPT | Performed by: EMERGENCY MEDICINE

## 2017-01-01 PROCEDURE — 77030005514 HC CATH URETH FOL14 BARD -A

## 2017-01-01 PROCEDURE — 99285 EMERGENCY DEPT VISIT HI MDM: CPT

## 2017-01-01 PROCEDURE — 83735 ASSAY OF MAGNESIUM: CPT | Performed by: INTERNAL MEDICINE

## 2017-01-01 PROCEDURE — 93306 TTE W/DOPPLER COMPLETE: CPT

## 2017-01-01 PROCEDURE — 5A12012 PERFORMANCE OF CARDIAC OUTPUT, SINGLE, MANUAL: ICD-10-PCS | Performed by: HOSPITALIST

## 2017-01-01 PROCEDURE — P9045 ALBUMIN (HUMAN), 5%, 250 ML: HCPCS | Performed by: INTERNAL MEDICINE

## 2017-01-01 PROCEDURE — 83880 ASSAY OF NATRIURETIC PEPTIDE: CPT | Performed by: PHYSICIAN ASSISTANT

## 2017-01-01 PROCEDURE — 77030012879 HC MSK CPAP FLL FAC PHIL -B

## 2017-01-01 PROCEDURE — 97535 SELF CARE MNGMENT TRAINING: CPT

## 2017-01-01 PROCEDURE — 77030002996 HC SUT SLK J&J -A

## 2017-01-01 PROCEDURE — 77030011255 HC DSG AQUACEL AG BMS -A

## 2017-01-01 PROCEDURE — 74011250636 HC RX REV CODE- 250/636: Performed by: RADIOLOGY

## 2017-01-01 PROCEDURE — 82533 TOTAL CORTISOL: CPT | Performed by: HOSPITALIST

## 2017-01-01 PROCEDURE — 83605 ASSAY OF LACTIC ACID: CPT | Performed by: EMERGENCY MEDICINE

## 2017-01-01 RX ORDER — POTASSIUM CHLORIDE 1500 MG/1
20 TABLET, FILM COATED, EXTENDED RELEASE ORAL 2 TIMES DAILY
Qty: 60 TAB | Refills: 1 | Status: SHIPPED
Start: 2017-01-01

## 2017-01-01 RX ORDER — LEVOFLOXACIN 5 MG/ML
500 INJECTION, SOLUTION INTRAVENOUS
Status: DISCONTINUED | OUTPATIENT
Start: 2017-01-01 | End: 2017-01-01 | Stop reason: DRUGHIGH

## 2017-01-01 RX ORDER — BUDESONIDE 0.5 MG/2ML
500 INHALANT ORAL
Status: DISCONTINUED | OUTPATIENT
Start: 2017-01-01 | End: 2017-01-01 | Stop reason: HOSPADM

## 2017-01-01 RX ORDER — POTASSIUM CHLORIDE 7.45 MG/ML
10 INJECTION INTRAVENOUS
Status: COMPLETED | OUTPATIENT
Start: 2017-01-01 | End: 2017-01-01

## 2017-01-01 RX ORDER — LORAZEPAM 2 MG/ML
1 INJECTION INTRAMUSCULAR ONCE
Status: COMPLETED | OUTPATIENT
Start: 2017-01-01 | End: 2017-01-01

## 2017-01-01 RX ORDER — IPRATROPIUM BROMIDE AND ALBUTEROL SULFATE 2.5; .5 MG/3ML; MG/3ML
3 SOLUTION RESPIRATORY (INHALATION)
Status: DISCONTINUED | OUTPATIENT
Start: 2017-01-01 | End: 2017-01-01 | Stop reason: HOSPADM

## 2017-01-01 RX ORDER — ACETYLCYSTEINE 200 MG/ML
2 SOLUTION ORAL; RESPIRATORY (INHALATION)
Status: DISCONTINUED | OUTPATIENT
Start: 2017-01-01 | End: 2017-01-01

## 2017-01-01 RX ORDER — BUMETANIDE 0.25 MG/ML
1 INJECTION INTRAMUSCULAR; INTRAVENOUS 2 TIMES DAILY
Status: DISCONTINUED | OUTPATIENT
Start: 2017-01-01 | End: 2017-01-01

## 2017-01-01 RX ORDER — BUMETANIDE 1 MG/1
3 TABLET ORAL DAILY
Status: ON HOLD | COMMUNITY
End: 2017-01-01

## 2017-01-01 RX ORDER — ASPIRIN 81 MG/1
81 TABLET ORAL DAILY
Status: DISCONTINUED | OUTPATIENT
Start: 2017-01-01 | End: 2017-01-01 | Stop reason: HOSPADM

## 2017-01-01 RX ORDER — ALBUTEROL SULFATE 0.83 MG/ML
2.5 SOLUTION RESPIRATORY (INHALATION)
Status: DISCONTINUED | OUTPATIENT
Start: 2017-01-01 | End: 2017-01-01 | Stop reason: HOSPADM

## 2017-01-01 RX ORDER — ALBUMIN HUMAN 50 G/1000ML
12.5 SOLUTION INTRAVENOUS EVERY 6 HOURS
Status: COMPLETED | OUTPATIENT
Start: 2017-01-01 | End: 2017-01-01

## 2017-01-01 RX ORDER — ASPIRIN 81 MG/1
81 TABLET ORAL DAILY
COMMUNITY

## 2017-01-01 RX ORDER — ISOSORBIDE DINITRATE 5 MG/1
10 TABLET ORAL 3 TIMES DAILY
Status: DISCONTINUED | OUTPATIENT
Start: 2017-01-01 | End: 2017-01-01 | Stop reason: HOSPADM

## 2017-01-01 RX ORDER — SERTRALINE HYDROCHLORIDE 100 MG/1
100 TABLET, FILM COATED ORAL DAILY
Status: ON HOLD | COMMUNITY
End: 2017-01-01

## 2017-01-01 RX ORDER — SPIRONOLACTONE 25 MG/1
25 TABLET ORAL DAILY
Qty: 30 TAB | Refills: 3 | Status: SHIPPED | OUTPATIENT
Start: 2017-01-01

## 2017-01-01 RX ORDER — EPINEPHRINE 0.1 MG/ML
INJECTION INTRACARDIAC; INTRAVENOUS
Status: COMPLETED | OUTPATIENT
Start: 2017-01-01 | End: 2017-01-01

## 2017-01-01 RX ORDER — POTASSIUM CHLORIDE 750 MG/1
40 TABLET, FILM COATED, EXTENDED RELEASE ORAL
Status: COMPLETED | OUTPATIENT
Start: 2017-01-01 | End: 2017-01-01

## 2017-01-01 RX ORDER — IPRATROPIUM BROMIDE AND ALBUTEROL SULFATE 2.5; .5 MG/3ML; MG/3ML
3 SOLUTION RESPIRATORY (INHALATION)
Status: DISCONTINUED | OUTPATIENT
Start: 2017-01-01 | End: 2017-01-01

## 2017-01-01 RX ORDER — SPIRONOLACTONE 25 MG/1
25 TABLET ORAL DAILY
Status: DISCONTINUED | OUTPATIENT
Start: 2017-01-01 | End: 2017-01-01

## 2017-01-01 RX ORDER — HYDRALAZINE HYDROCHLORIDE 25 MG/1
25 TABLET, FILM COATED ORAL 3 TIMES DAILY
Status: DISCONTINUED | OUTPATIENT
Start: 2017-01-01 | End: 2017-01-01

## 2017-01-01 RX ORDER — FENOFIBRATE 48 MG/1
48 TABLET, COATED ORAL DAILY
COMMUNITY

## 2017-01-01 RX ORDER — BUDESONIDE 0.5 MG/2ML
500 INHALANT ORAL 2 TIMES DAILY
Qty: 120 ML | Refills: 0 | Status: SHIPPED
Start: 2017-01-01

## 2017-01-01 RX ORDER — BUMETANIDE 0.25 MG/ML
1 INJECTION INTRAMUSCULAR; INTRAVENOUS 3 TIMES DAILY
Status: DISCONTINUED | OUTPATIENT
Start: 2017-01-01 | End: 2017-01-01

## 2017-01-01 RX ORDER — IPRATROPIUM BROMIDE AND ALBUTEROL SULFATE 2.5; .5 MG/3ML; MG/3ML
3 SOLUTION RESPIRATORY (INHALATION)
Qty: 30 NEBULE | Refills: 2 | Status: SHIPPED
Start: 2017-01-01

## 2017-01-01 RX ORDER — ACETAMINOPHEN 325 MG/1
650 TABLET ORAL
Status: DISCONTINUED | OUTPATIENT
Start: 2017-01-01 | End: 2017-01-01 | Stop reason: HOSPADM

## 2017-01-01 RX ORDER — CARVEDILOL 6.25 MG/1
6.25 TABLET ORAL 2 TIMES DAILY WITH MEALS
Status: DISCONTINUED | OUTPATIENT
Start: 2017-01-01 | End: 2017-01-01 | Stop reason: HOSPADM

## 2017-01-01 RX ORDER — SODIUM CHLORIDE 0.9 % (FLUSH) 0.9 %
5-10 SYRINGE (ML) INJECTION AS NEEDED
Status: DISCONTINUED | OUTPATIENT
Start: 2017-01-01 | End: 2017-01-01 | Stop reason: HOSPADM

## 2017-01-01 RX ORDER — POTASSIUM CHLORIDE 750 MG/1
20 TABLET, FILM COATED, EXTENDED RELEASE ORAL 2 TIMES DAILY
Status: DISCONTINUED | OUTPATIENT
Start: 2017-01-01 | End: 2017-01-01

## 2017-01-01 RX ORDER — HEPARIN SODIUM 5000 [USP'U]/ML
5000 INJECTION, SOLUTION INTRAVENOUS; SUBCUTANEOUS EVERY 8 HOURS
Status: DISCONTINUED | OUTPATIENT
Start: 2017-01-01 | End: 2017-01-01 | Stop reason: HOSPADM

## 2017-01-01 RX ORDER — HEPARIN SODIUM 1000 [USP'U]/ML
INJECTION, SOLUTION INTRAVENOUS; SUBCUTANEOUS
Status: DISPENSED
Start: 2017-01-01 | End: 2017-01-01

## 2017-01-01 RX ORDER — ASPIRIN 81 MG/1
81 TABLET ORAL DAILY
Status: DISCONTINUED | OUTPATIENT
Start: 2017-01-01 | End: 2017-01-01 | Stop reason: SDUPTHER

## 2017-01-01 RX ORDER — POTASSIUM CHLORIDE 750 MG/1
20 TABLET, FILM COATED, EXTENDED RELEASE ORAL 2 TIMES DAILY
Status: DISCONTINUED | OUTPATIENT
Start: 2017-01-01 | End: 2017-01-01 | Stop reason: HOSPADM

## 2017-01-01 RX ORDER — PREDNISONE 20 MG/1
40 TABLET ORAL
Status: DISCONTINUED | OUTPATIENT
Start: 2017-01-01 | End: 2017-01-01

## 2017-01-01 RX ORDER — FENOFIBRATE 40 MG/1
48 TABLET ORAL DAILY
COMMUNITY
End: 2017-01-01 | Stop reason: SDUPTHER

## 2017-01-01 RX ORDER — SERTRALINE HYDROCHLORIDE 100 MG/1
100 TABLET, FILM COATED ORAL DAILY
Qty: 15 TAB | Refills: 0 | Status: SHIPPED | OUTPATIENT
Start: 2017-01-01

## 2017-01-01 RX ORDER — AMIODARONE HYDROCHLORIDE 150 MG/3ML
INJECTION, SOLUTION INTRAVENOUS
Status: COMPLETED | OUTPATIENT
Start: 2017-01-01 | End: 2017-01-01

## 2017-01-01 RX ORDER — METOPROLOL TARTRATE 25 MG/1
25 TABLET, FILM COATED ORAL 2 TIMES DAILY
Status: CANCELLED | OUTPATIENT
Start: 2017-01-01

## 2017-01-01 RX ORDER — LISINOPRIL 5 MG/1
2.5 TABLET ORAL DAILY
Status: CANCELLED | OUTPATIENT
Start: 2017-01-01

## 2017-01-01 RX ORDER — SODIUM CHLORIDE 9 MG/ML
50 INJECTION, SOLUTION INTRAVENOUS CONTINUOUS
Status: DISCONTINUED | OUTPATIENT
Start: 2017-01-01 | End: 2017-01-01

## 2017-01-01 RX ORDER — ACETYLCYSTEINE 200 MG/ML
200 SOLUTION ORAL; RESPIRATORY (INHALATION)
Status: DISCONTINUED | OUTPATIENT
Start: 2017-01-01 | End: 2017-01-01 | Stop reason: HOSPADM

## 2017-01-01 RX ORDER — PREDNISONE 10 MG/1
TABLET ORAL
Qty: 30 TAB | Refills: 0 | Status: SHIPPED
Start: 2017-01-01

## 2017-01-01 RX ORDER — SERTRALINE HYDROCHLORIDE 50 MG/1
100 TABLET, FILM COATED ORAL DAILY
Status: DISCONTINUED | OUTPATIENT
Start: 2017-01-01 | End: 2017-01-01 | Stop reason: HOSPADM

## 2017-01-01 RX ORDER — GUAIFENESIN 600 MG/1
600 TABLET, EXTENDED RELEASE ORAL EVERY 12 HOURS
Status: DISCONTINUED | OUTPATIENT
Start: 2017-01-01 | End: 2017-01-01 | Stop reason: HOSPADM

## 2017-01-01 RX ORDER — ONDANSETRON 2 MG/ML
4 INJECTION INTRAMUSCULAR; INTRAVENOUS
Status: DISCONTINUED | OUTPATIENT
Start: 2017-01-01 | End: 2017-01-01 | Stop reason: HOSPADM

## 2017-01-01 RX ORDER — HYDRALAZINE HYDROCHLORIDE 25 MG/1
25 TABLET, FILM COATED ORAL 3 TIMES DAILY
Status: DISCONTINUED | OUTPATIENT
Start: 2017-01-01 | End: 2017-01-01 | Stop reason: HOSPADM

## 2017-01-01 RX ORDER — BUMETANIDE 1 MG/1
1 TABLET ORAL 2 TIMES DAILY
Qty: 60 TAB | Refills: 3 | Status: SHIPPED | OUTPATIENT
Start: 2017-01-01

## 2017-01-01 RX ORDER — ISOSORBIDE DINITRATE 10 MG/1
10 TABLET ORAL 3 TIMES DAILY
Status: DISCONTINUED | OUTPATIENT
Start: 2017-01-01 | End: 2017-01-01

## 2017-01-01 RX ORDER — HEPARIN SODIUM 1000 [USP'U]/ML
10000 INJECTION, SOLUTION INTRAVENOUS; SUBCUTANEOUS
Status: COMPLETED | OUTPATIENT
Start: 2017-01-01 | End: 2017-01-01

## 2017-01-01 RX ORDER — POLYETHYLENE GLYCOL 3350 17 G/17G
17 POWDER, FOR SOLUTION ORAL DAILY
Status: DISCONTINUED | OUTPATIENT
Start: 2017-01-01 | End: 2017-01-01

## 2017-01-01 RX ORDER — CARVEDILOL 6.25 MG/1
6.25 TABLET ORAL 2 TIMES DAILY WITH MEALS
Status: DISCONTINUED | OUTPATIENT
Start: 2017-01-01 | End: 2017-01-01

## 2017-01-01 RX ORDER — LEVOFLOXACIN 5 MG/ML
500 INJECTION, SOLUTION INTRAVENOUS EVERY 24 HOURS
Status: DISCONTINUED | OUTPATIENT
Start: 2017-01-01 | End: 2017-01-01 | Stop reason: DRUGHIGH

## 2017-01-01 RX ORDER — BUMETANIDE 1 MG/1
1 TABLET ORAL 2 TIMES DAILY
Status: DISCONTINUED | OUTPATIENT
Start: 2017-01-01 | End: 2017-01-01 | Stop reason: HOSPADM

## 2017-01-01 RX ORDER — SODIUM CHLORIDE 0.9 % (FLUSH) 0.9 %
5-10 SYRINGE (ML) INJECTION EVERY 8 HOURS
Status: DISCONTINUED | OUTPATIENT
Start: 2017-01-01 | End: 2017-01-01 | Stop reason: HOSPADM

## 2017-01-01 RX ORDER — SODIUM BICARBONATE 1 MEQ/ML
SYRINGE (ML) INTRAVENOUS
Status: COMPLETED | OUTPATIENT
Start: 2017-01-01 | End: 2017-01-01

## 2017-01-01 RX ORDER — GUAIFENESIN 600 MG/1
600 TABLET, EXTENDED RELEASE ORAL EVERY 12 HOURS
Qty: 60 TAB | Refills: 0 | Status: SHIPPED
Start: 2017-01-01

## 2017-01-01 RX ORDER — PREDNISONE 20 MG/1
40 TABLET ORAL
Status: DISCONTINUED | OUTPATIENT
Start: 2017-01-01 | End: 2017-01-01 | Stop reason: HOSPADM

## 2017-01-01 RX ORDER — BUMETANIDE 0.25 MG/ML
2 INJECTION INTRAMUSCULAR; INTRAVENOUS EVERY 12 HOURS
Status: DISCONTINUED | OUTPATIENT
Start: 2017-01-01 | End: 2017-01-01 | Stop reason: ALTCHOICE

## 2017-01-01 RX ORDER — METOPROLOL TARTRATE 25 MG/1
25 TABLET, FILM COATED ORAL 2 TIMES DAILY
COMMUNITY
End: 2017-01-01

## 2017-01-01 RX ORDER — ALBUMIN HUMAN 250 G/1000ML
25 SOLUTION INTRAVENOUS ONCE
Status: COMPLETED | OUTPATIENT
Start: 2017-01-01 | End: 2017-01-01

## 2017-01-01 RX ORDER — METOPROLOL TARTRATE 5 MG/5ML
5 INJECTION INTRAVENOUS ONCE
Status: DISCONTINUED | OUTPATIENT
Start: 2017-01-01 | End: 2017-01-01

## 2017-01-01 RX ORDER — LIDOCAINE HYDROCHLORIDE 20 MG/ML
20 INJECTION, SOLUTION INFILTRATION; PERINEURAL
Status: COMPLETED | OUTPATIENT
Start: 2017-01-01 | End: 2017-01-01

## 2017-01-01 RX ORDER — CALCIUM CARBONATE 200(500)MG
200 TABLET,CHEWABLE ORAL AS NEEDED
Status: DISCONTINUED | OUTPATIENT
Start: 2017-01-01 | End: 2017-01-01 | Stop reason: HOSPADM

## 2017-01-01 RX ORDER — HYDRALAZINE HYDROCHLORIDE 25 MG/1
25 TABLET, FILM COATED ORAL 3 TIMES DAILY
Qty: 90 TAB | Refills: 3 | Status: SHIPPED | OUTPATIENT
Start: 2017-01-01

## 2017-01-01 RX ORDER — SODIUM CHLORIDE 0.9 % (FLUSH) 0.9 %
20 SYRINGE (ML) INJECTION
Status: COMPLETED | OUTPATIENT
Start: 2017-01-01 | End: 2017-01-01

## 2017-01-01 RX ORDER — ISOSORBIDE DINITRATE 10 MG/1
10 TABLET ORAL 3 TIMES DAILY
Qty: 90 TAB | Refills: 3 | Status: SHIPPED | OUTPATIENT
Start: 2017-01-01

## 2017-01-01 RX ORDER — ISOSORBIDE DINITRATE 5 MG/1
5 TABLET ORAL 3 TIMES DAILY
Status: DISCONTINUED | OUTPATIENT
Start: 2017-01-01 | End: 2017-01-01

## 2017-01-01 RX ORDER — MILRINONE LACTATE 0.2 MG/ML
0.2 INJECTION, SOLUTION INTRAVENOUS CONTINUOUS
Status: DISCONTINUED | OUTPATIENT
Start: 2017-01-01 | End: 2017-01-01

## 2017-01-01 RX ORDER — FENOFIBRATE 48 MG/1
48 TABLET, COATED ORAL DAILY
Status: DISCONTINUED | OUTPATIENT
Start: 2017-01-01 | End: 2017-01-01 | Stop reason: HOSPADM

## 2017-01-01 RX ORDER — CARVEDILOL 6.25 MG/1
6.25 TABLET ORAL 2 TIMES DAILY WITH MEALS
Qty: 60 TAB | Refills: 3 | Status: SHIPPED | OUTPATIENT
Start: 2017-01-01

## 2017-01-01 RX ORDER — VANCOMYCIN/0.9 % SOD CHLORIDE 1.5G/250ML
1500 PLASTIC BAG, INJECTION (ML) INTRAVENOUS
Status: DISCONTINUED | OUTPATIENT
Start: 2017-01-01 | End: 2017-01-01

## 2017-01-01 RX ORDER — VANCOMYCIN 2 GRAM/500 ML IN 0.9 % SODIUM CHLORIDE INTRAVENOUS
2000 ONCE
Status: COMPLETED | OUTPATIENT
Start: 2017-01-01 | End: 2017-01-01

## 2017-01-01 RX ORDER — PREDNISONE 20 MG/1
20 TABLET ORAL
Status: DISCONTINUED | OUTPATIENT
Start: 2017-01-01 | End: 2017-01-01

## 2017-01-01 RX ORDER — LIDOCAINE HYDROCHLORIDE 20 MG/ML
INJECTION, SOLUTION INFILTRATION; PERINEURAL
Status: DISPENSED
Start: 2017-01-01 | End: 2017-01-01

## 2017-01-01 RX ORDER — SPIRONOLACTONE 25 MG/1
12.5 TABLET ORAL DAILY
Status: DISCONTINUED | OUTPATIENT
Start: 2017-01-01 | End: 2017-01-01

## 2017-01-01 RX ORDER — LISINOPRIL 2.5 MG/1
2.5 TABLET ORAL DAILY
COMMUNITY
End: 2017-01-01

## 2017-01-01 RX ORDER — SPIRONOLACTONE 25 MG/1
25 TABLET ORAL DAILY
Status: DISCONTINUED | OUTPATIENT
Start: 2017-01-01 | End: 2017-01-01 | Stop reason: HOSPADM

## 2017-01-01 RX ORDER — AMOXICILLIN 250 MG
1 CAPSULE ORAL
Status: DISCONTINUED | OUTPATIENT
Start: 2017-01-01 | End: 2017-01-01

## 2017-01-01 RX ORDER — LEVOFLOXACIN 250 MG/1
250 TABLET ORAL EVERY 24 HOURS
Status: DISCONTINUED | OUTPATIENT
Start: 2017-01-01 | End: 2017-01-01

## 2017-01-01 RX ORDER — HYDROCORTISONE SODIUM SUCCINATE 100 MG/2ML
50 INJECTION, POWDER, FOR SOLUTION INTRAMUSCULAR; INTRAVENOUS EVERY 6 HOURS
Status: DISCONTINUED | OUTPATIENT
Start: 2017-01-01 | End: 2017-01-01 | Stop reason: HOSPADM

## 2017-01-01 RX ADMIN — CALCIUM CARBONATE (ANTACID) CHEW TAB 500 MG 200 MG: 500 CHEW TAB at 23:36

## 2017-01-01 RX ADMIN — POTASSIUM CHLORIDE 10 MEQ: 7.46 INJECTION, SOLUTION INTRAVENOUS at 10:32

## 2017-01-01 RX ADMIN — ALBUTEROL SULFATE 1 DOSE: 2.5 SOLUTION RESPIRATORY (INHALATION) at 15:40

## 2017-01-01 RX ADMIN — SPIRONOLACTONE 12.5 MG: 25 TABLET, FILM COATED ORAL at 08:53

## 2017-01-01 RX ADMIN — ISOSORBIDE DINITRATE 10 MG: 10 TABLET ORAL at 08:41

## 2017-01-01 RX ADMIN — Medication 10 ML: at 05:25

## 2017-01-01 RX ADMIN — HEPARIN SODIUM 5000 UNITS: 5000 INJECTION, SOLUTION INTRAVENOUS; SUBCUTANEOUS at 15:05

## 2017-01-01 RX ADMIN — PERFLUTREN 1.5 ML: 6.52 INJECTION, SUSPENSION INTRAVENOUS at 11:51

## 2017-01-01 RX ADMIN — LEVOFLOXACIN 250 MG: 250 TABLET, FILM COATED ORAL at 19:20

## 2017-01-01 RX ADMIN — BUMETANIDE 1 MG: 0.25 INJECTION INTRAMUSCULAR; INTRAVENOUS at 18:58

## 2017-01-01 RX ADMIN — IPRATROPIUM BROMIDE AND ALBUTEROL SULFATE 3 ML: .5; 3 SOLUTION RESPIRATORY (INHALATION) at 19:31

## 2017-01-01 RX ADMIN — FENOFIBRATE 48 MG: 48 TABLET ORAL at 08:44

## 2017-01-01 RX ADMIN — Medication 10 ML: at 22:48

## 2017-01-01 RX ADMIN — HYDRALAZINE HYDROCHLORIDE 25 MG: 25 TABLET, FILM COATED ORAL at 08:55

## 2017-01-01 RX ADMIN — FENOFIBRATE 48 MG: 48 TABLET ORAL at 09:26

## 2017-01-01 RX ADMIN — BUDESONIDE 500 MCG: 0.5 INHALANT RESPIRATORY (INHALATION) at 19:49

## 2017-01-01 RX ADMIN — Medication 10 ML: at 06:27

## 2017-01-01 RX ADMIN — HYDRALAZINE HYDROCHLORIDE 25 MG: 50 TABLET, FILM COATED ORAL at 16:05

## 2017-01-01 RX ADMIN — HEPARIN SODIUM 5000 UNITS: 5000 INJECTION, SOLUTION INTRAVENOUS; SUBCUTANEOUS at 03:49

## 2017-01-01 RX ADMIN — CARVEDILOL 6.25 MG: 6.25 TABLET, FILM COATED ORAL at 08:53

## 2017-01-01 RX ADMIN — ASPIRIN 81 MG: 81 TABLET, COATED ORAL at 09:04

## 2017-01-01 RX ADMIN — HYDRALAZINE HYDROCHLORIDE 25 MG: 25 TABLET, FILM COATED ORAL at 17:16

## 2017-01-01 RX ADMIN — BUDESONIDE 500 MCG: 0.5 INHALANT RESPIRATORY (INHALATION) at 19:41

## 2017-01-01 RX ADMIN — VANCOMYCIN HYDROCHLORIDE 1000 MG: 1 INJECTION, POWDER, LYOPHILIZED, FOR SOLUTION INTRAVENOUS at 22:16

## 2017-01-01 RX ADMIN — SPIRONOLACTONE 25 MG: 25 TABLET, FILM COATED ORAL at 08:41

## 2017-01-01 RX ADMIN — ASPIRIN 81 MG: 81 TABLET, COATED ORAL at 08:54

## 2017-01-01 RX ADMIN — HYDRALAZINE HYDROCHLORIDE 25 MG: 50 TABLET, FILM COATED ORAL at 08:15

## 2017-01-01 RX ADMIN — LEVOFLOXACIN 500 MG: 5 INJECTION, SOLUTION INTRAVENOUS at 18:39

## 2017-01-01 RX ADMIN — Medication 10 ML: at 21:51

## 2017-01-01 RX ADMIN — Medication 10 ML: at 05:27

## 2017-01-01 RX ADMIN — Medication 10 ML: at 16:14

## 2017-01-01 RX ADMIN — METHYLPREDNISOLONE SODIUM SUCCINATE 40 MG: 40 INJECTION, POWDER, FOR SOLUTION INTRAMUSCULAR; INTRAVENOUS at 22:00

## 2017-01-01 RX ADMIN — POTASSIUM CHLORIDE 20 MEQ: 750 TABLET, FILM COATED, EXTENDED RELEASE ORAL at 19:56

## 2017-01-01 RX ADMIN — ASPIRIN 81 MG: 81 TABLET, COATED ORAL at 11:05

## 2017-01-01 RX ADMIN — IPRATROPIUM BROMIDE AND ALBUTEROL SULFATE 3 ML: .5; 3 SOLUTION RESPIRATORY (INHALATION) at 20:29

## 2017-01-01 RX ADMIN — BUMETANIDE 1 MG: 0.25 INJECTION INTRAMUSCULAR; INTRAVENOUS at 19:56

## 2017-01-01 RX ADMIN — ASPIRIN 81 MG: 81 TABLET, COATED ORAL at 08:15

## 2017-01-01 RX ADMIN — FENOFIBRATE 48 MG: 48 TABLET ORAL at 08:52

## 2017-01-01 RX ADMIN — SPIRONOLACTONE 25 MG: 25 TABLET, FILM COATED ORAL at 11:04

## 2017-01-01 RX ADMIN — HEPARIN SODIUM 5000 UNITS: 5000 INJECTION, SOLUTION INTRAVENOUS; SUBCUTANEOUS at 11:25

## 2017-01-01 RX ADMIN — IPRATROPIUM BROMIDE AND ALBUTEROL SULFATE 3 ML: .5; 3 SOLUTION RESPIRATORY (INHALATION) at 20:07

## 2017-01-01 RX ADMIN — Medication 10 ML: at 02:03

## 2017-01-01 RX ADMIN — SERTRALINE HYDROCHLORIDE 100 MG: 50 TABLET ORAL at 08:41

## 2017-01-01 RX ADMIN — HEPARIN SODIUM 5000 UNITS: 5000 INJECTION, SOLUTION INTRAVENOUS; SUBCUTANEOUS at 18:34

## 2017-01-01 RX ADMIN — HEPARIN SODIUM 5000 UNITS: 5000 INJECTION, SOLUTION INTRAVENOUS; SUBCUTANEOUS at 11:48

## 2017-01-01 RX ADMIN — SERTRALINE HYDROCHLORIDE 100 MG: 50 TABLET ORAL at 08:52

## 2017-01-01 RX ADMIN — BUMETANIDE 1 MG: 1 TABLET ORAL at 19:07

## 2017-01-01 RX ADMIN — PREDNISONE 40 MG: 20 TABLET ORAL at 09:03

## 2017-01-01 RX ADMIN — DOCUSATE SODIUM AND SENNOSIDES 1 TABLET: 8.6; 5 TABLET, FILM COATED ORAL at 22:08

## 2017-01-01 RX ADMIN — SERTRALINE HYDROCHLORIDE 100 MG: 50 TABLET ORAL at 11:04

## 2017-01-01 RX ADMIN — HYDRALAZINE HYDROCHLORIDE 25 MG: 25 TABLET, FILM COATED ORAL at 22:08

## 2017-01-01 RX ADMIN — BUMETANIDE 1 MG: 1 TABLET ORAL at 08:44

## 2017-01-01 RX ADMIN — CALCIUM CARBONATE (ANTACID) CHEW TAB 500 MG 200 MG: 500 CHEW TAB at 11:07

## 2017-01-01 RX ADMIN — CARVEDILOL 6.25 MG: 6.25 TABLET, FILM COATED ORAL at 08:26

## 2017-01-01 RX ADMIN — CARVEDILOL 6.25 MG: 6.25 TABLET, FILM COATED ORAL at 16:05

## 2017-01-01 RX ADMIN — POTASSIUM CHLORIDE 20 MEQ: 750 TABLET, FILM COATED, EXTENDED RELEASE ORAL at 08:44

## 2017-01-01 RX ADMIN — CARVEDILOL 6.25 MG: 6.25 TABLET, FILM COATED ORAL at 08:15

## 2017-01-01 RX ADMIN — CARVEDILOL 6.25 MG: 6.25 TABLET, FILM COATED ORAL at 17:23

## 2017-01-01 RX ADMIN — BUMETANIDE 1 MG: 1 TABLET ORAL at 17:40

## 2017-01-01 RX ADMIN — HYDRALAZINE HYDROCHLORIDE 25 MG: 50 TABLET, FILM COATED ORAL at 21:31

## 2017-01-01 RX ADMIN — HYDRALAZINE HYDROCHLORIDE 25 MG: 50 TABLET, FILM COATED ORAL at 16:08

## 2017-01-01 RX ADMIN — HYDRALAZINE HYDROCHLORIDE 25 MG: 50 TABLET, FILM COATED ORAL at 09:15

## 2017-01-01 RX ADMIN — SERTRALINE HYDROCHLORIDE 100 MG: 50 TABLET ORAL at 08:26

## 2017-01-01 RX ADMIN — LEVOFLOXACIN 500 MG: 5 INJECTION, SOLUTION INTRAVENOUS at 19:56

## 2017-01-01 RX ADMIN — PREDNISONE 40 MG: 20 TABLET ORAL at 09:04

## 2017-01-01 RX ADMIN — FENOFIBRATE 48 MG: 48 TABLET ORAL at 08:47

## 2017-01-01 RX ADMIN — BUMETANIDE 1 MG: 0.25 INJECTION INTRAMUSCULAR; INTRAVENOUS at 08:16

## 2017-01-01 RX ADMIN — IPRATROPIUM BROMIDE AND ALBUTEROL SULFATE 3 ML: .5; 3 SOLUTION RESPIRATORY (INHALATION) at 00:53

## 2017-01-01 RX ADMIN — Medication 10 ML: at 14:19

## 2017-01-01 RX ADMIN — METHYLPREDNISOLONE SODIUM SUCCINATE 40 MG: 125 INJECTION, POWDER, FOR SOLUTION INTRAMUSCULAR; INTRAVENOUS at 01:43

## 2017-01-01 RX ADMIN — SODIUM CHLORIDE 50 ML/HR: 900 INJECTION, SOLUTION INTRAVENOUS at 11:25

## 2017-01-01 RX ADMIN — POTASSIUM CHLORIDE 20 MEQ: 750 TABLET, FILM COATED, EXTENDED RELEASE ORAL at 21:03

## 2017-01-01 RX ADMIN — CEFTRIAXONE 1 G: 1 INJECTION, POWDER, FOR SOLUTION INTRAMUSCULAR; INTRAVENOUS at 16:29

## 2017-01-01 RX ADMIN — DOCUSATE SODIUM AND SENNOSIDES 1 TABLET: 8.6; 5 TABLET, FILM COATED ORAL at 21:36

## 2017-01-01 RX ADMIN — ASPIRIN 81 MG: 81 TABLET, COATED ORAL at 08:46

## 2017-01-01 RX ADMIN — Medication 10 ML: at 22:16

## 2017-01-01 RX ADMIN — AMIODARONE HYDROCHLORIDE 300 MG: 50 INJECTION, SOLUTION INTRAVENOUS at 11:54

## 2017-01-01 RX ADMIN — CARVEDILOL 6.25 MG: 6.25 TABLET, FILM COATED ORAL at 08:21

## 2017-01-01 RX ADMIN — HEPARIN SODIUM 5000 UNITS: 5000 INJECTION, SOLUTION INTRAVENOUS; SUBCUTANEOUS at 19:05

## 2017-01-01 RX ADMIN — CARVEDILOL 6.25 MG: 6.25 TABLET, FILM COATED ORAL at 16:07

## 2017-01-01 RX ADMIN — HEPARIN SODIUM 5000 UNITS: 5000 INJECTION, SOLUTION INTRAVENOUS; SUBCUTANEOUS at 18:58

## 2017-01-01 RX ADMIN — CEFEPIME HYDROCHLORIDE 2 G: 2 INJECTION, POWDER, FOR SOLUTION INTRAVENOUS at 18:07

## 2017-01-01 RX ADMIN — ASPIRIN 81 MG: 81 TABLET, COATED ORAL at 08:26

## 2017-01-01 RX ADMIN — BUMETANIDE 1 MG: 1 TABLET ORAL at 08:20

## 2017-01-01 RX ADMIN — ALBUMIN (HUMAN) 25 G: 0.25 INJECTION, SOLUTION INTRAVENOUS at 06:40

## 2017-01-01 RX ADMIN — SPIRONOLACTONE 12.5 MG: 25 TABLET, FILM COATED ORAL at 10:36

## 2017-01-01 RX ADMIN — METHYLPREDNISOLONE SODIUM SUCCINATE 40 MG: 40 INJECTION, POWDER, FOR SOLUTION INTRAMUSCULAR; INTRAVENOUS at 08:53

## 2017-01-01 RX ADMIN — SERTRALINE HYDROCHLORIDE 100 MG: 50 TABLET ORAL at 08:50

## 2017-01-01 RX ADMIN — METHYLPREDNISOLONE SODIUM SUCCINATE 40 MG: 40 INJECTION, POWDER, FOR SOLUTION INTRAMUSCULAR; INTRAVENOUS at 20:17

## 2017-01-01 RX ADMIN — SERTRALINE HYDROCHLORIDE 100 MG: 50 TABLET ORAL at 08:54

## 2017-01-01 RX ADMIN — BUDESONIDE 500 MCG: 0.5 INHALANT RESPIRATORY (INHALATION) at 22:29

## 2017-01-01 RX ADMIN — HYDRALAZINE HYDROCHLORIDE 25 MG: 25 TABLET, FILM COATED ORAL at 21:52

## 2017-01-01 RX ADMIN — BUDESONIDE 500 MCG: 0.5 INHALANT RESPIRATORY (INHALATION) at 20:01

## 2017-01-01 RX ADMIN — SERTRALINE HYDROCHLORIDE 100 MG: 50 TABLET ORAL at 08:15

## 2017-01-01 RX ADMIN — ISOSORBIDE DINITRATE 5 MG: 5 TABLET ORAL at 16:03

## 2017-01-01 RX ADMIN — LEVOFLOXACIN 250 MG: 250 TABLET, FILM COATED ORAL at 19:54

## 2017-01-01 RX ADMIN — METHYLPREDNISOLONE SODIUM SUCCINATE 60 MG: 125 INJECTION, POWDER, FOR SOLUTION INTRAMUSCULAR; INTRAVENOUS at 01:28

## 2017-01-01 RX ADMIN — POLYETHYLENE GLYCOL 3350 17 G: 17 POWDER, FOR SOLUTION ORAL at 08:55

## 2017-01-01 RX ADMIN — HEPARIN SODIUM 5000 UNITS: 5000 INJECTION, SOLUTION INTRAVENOUS; SUBCUTANEOUS at 03:50

## 2017-01-01 RX ADMIN — ISOSORBIDE DINITRATE 10 MG: 10 TABLET ORAL at 22:08

## 2017-01-01 RX ADMIN — HEPARIN SODIUM 5000 UNITS: 5000 INJECTION, SOLUTION INTRAVENOUS; SUBCUTANEOUS at 06:27

## 2017-01-01 RX ADMIN — ASPIRIN 81 MG: 81 TABLET, COATED ORAL at 08:44

## 2017-01-01 RX ADMIN — METHYLPREDNISOLONE SODIUM SUCCINATE 60 MG: 125 INJECTION, POWDER, FOR SOLUTION INTRAMUSCULAR; INTRAVENOUS at 17:30

## 2017-01-01 RX ADMIN — BUMETANIDE 1 MG: 0.25 INJECTION INTRAMUSCULAR; INTRAVENOUS at 21:27

## 2017-01-01 RX ADMIN — Medication 10 ML: at 06:00

## 2017-01-01 RX ADMIN — IPRATROPIUM BROMIDE AND ALBUTEROL SULFATE 3 ML: .5; 3 SOLUTION RESPIRATORY (INHALATION) at 11:44

## 2017-01-01 RX ADMIN — GUAIFENESIN 600 MG: 600 TABLET, EXTENDED RELEASE ORAL at 10:48

## 2017-01-01 RX ADMIN — HEPARIN SODIUM 5000 UNITS: 5000 INJECTION, SOLUTION INTRAVENOUS; SUBCUTANEOUS at 11:08

## 2017-01-01 RX ADMIN — HEPARIN SODIUM 5000 UNITS: 5000 INJECTION, SOLUTION INTRAVENOUS; SUBCUTANEOUS at 03:18

## 2017-01-01 RX ADMIN — ACETYLCYSTEINE 400 MG: 200 SOLUTION ORAL; RESPIRATORY (INHALATION) at 20:01

## 2017-01-01 RX ADMIN — BUDESONIDE 500 MCG: 0.5 INHALANT RESPIRATORY (INHALATION) at 08:11

## 2017-01-01 RX ADMIN — POTASSIUM CHLORIDE 20 MEQ: 750 TABLET, FILM COATED, EXTENDED RELEASE ORAL at 17:45

## 2017-01-01 RX ADMIN — IPRATROPIUM BROMIDE AND ALBUTEROL SULFATE 3 ML: .5; 3 SOLUTION RESPIRATORY (INHALATION) at 09:25

## 2017-01-01 RX ADMIN — BUMETANIDE 1 MG: 0.25 INJECTION INTRAMUSCULAR; INTRAVENOUS at 16:08

## 2017-01-01 RX ADMIN — IPRATROPIUM BROMIDE AND ALBUTEROL SULFATE 3 ML: .5; 3 SOLUTION RESPIRATORY (INHALATION) at 07:45

## 2017-01-01 RX ADMIN — POLYETHYLENE GLYCOL 3350 17 G: 17 POWDER, FOR SOLUTION ORAL at 08:40

## 2017-01-01 RX ADMIN — HEPARIN SODIUM 5000 UNITS: 5000 INJECTION, SOLUTION INTRAVENOUS; SUBCUTANEOUS at 22:18

## 2017-01-01 RX ADMIN — FENOFIBRATE 48 MG: 48 TABLET ORAL at 08:26

## 2017-01-01 RX ADMIN — HEPARIN SODIUM 5000 UNITS: 5000 INJECTION, SOLUTION INTRAVENOUS; SUBCUTANEOUS at 06:51

## 2017-01-01 RX ADMIN — POLYETHYLENE GLYCOL 3350 17 G: 17 POWDER, FOR SOLUTION ORAL at 09:08

## 2017-01-01 RX ADMIN — CARVEDILOL 6.25 MG: 6.25 TABLET, FILM COATED ORAL at 17:00

## 2017-01-01 RX ADMIN — METHYLPREDNISOLONE SODIUM SUCCINATE 40 MG: 40 INJECTION, POWDER, FOR SOLUTION INTRAMUSCULAR; INTRAVENOUS at 14:18

## 2017-01-01 RX ADMIN — ISOSORBIDE DINITRATE 10 MG: 5 TABLET ORAL at 16:08

## 2017-01-01 RX ADMIN — Medication 10 ML: at 22:17

## 2017-01-01 RX ADMIN — METHYLPREDNISOLONE SODIUM SUCCINATE 40 MG: 40 INJECTION, POWDER, FOR SOLUTION INTRAMUSCULAR; INTRAVENOUS at 08:27

## 2017-01-01 RX ADMIN — HEPARIN SODIUM 5000 UNITS: 5000 INJECTION, SOLUTION INTRAVENOUS; SUBCUTANEOUS at 04:51

## 2017-01-01 RX ADMIN — HYDRALAZINE HYDROCHLORIDE 25 MG: 50 TABLET, FILM COATED ORAL at 22:19

## 2017-01-01 RX ADMIN — IPRATROPIUM BROMIDE AND ALBUTEROL SULFATE 3 ML: .5; 3 SOLUTION RESPIRATORY (INHALATION) at 20:01

## 2017-01-01 RX ADMIN — Medication 1 CAPSULE: at 12:53

## 2017-01-01 RX ADMIN — IPRATROPIUM BROMIDE AND ALBUTEROL SULFATE 3 ML: .5; 3 SOLUTION RESPIRATORY (INHALATION) at 21:20

## 2017-01-01 RX ADMIN — Medication 10 ML: at 21:29

## 2017-01-01 RX ADMIN — BUDESONIDE 500 MCG: 0.5 INHALANT RESPIRATORY (INHALATION) at 07:45

## 2017-01-01 RX ADMIN — Medication 10 ML: at 14:05

## 2017-01-01 RX ADMIN — SERTRALINE HYDROCHLORIDE 100 MG: 50 TABLET ORAL at 10:36

## 2017-01-01 RX ADMIN — ALBUTEROL SULFATE 1 DOSE: 2.5 SOLUTION RESPIRATORY (INHALATION) at 15:20

## 2017-01-01 RX ADMIN — IPRATROPIUM BROMIDE AND ALBUTEROL SULFATE 3 ML: .5; 3 SOLUTION RESPIRATORY (INHALATION) at 03:58

## 2017-01-01 RX ADMIN — IPRATROPIUM BROMIDE AND ALBUTEROL SULFATE 3 ML: .5; 3 SOLUTION RESPIRATORY (INHALATION) at 18:24

## 2017-01-01 RX ADMIN — HEPARIN SODIUM 5000 UNITS: 5000 INJECTION, SOLUTION INTRAVENOUS; SUBCUTANEOUS at 03:40

## 2017-01-01 RX ADMIN — POLYETHYLENE GLYCOL 3350 17 G: 17 POWDER, FOR SOLUTION ORAL at 08:27

## 2017-01-01 RX ADMIN — HEPARIN SODIUM 5000 UNITS: 5000 INJECTION, SOLUTION INTRAVENOUS; SUBCUTANEOUS at 05:25

## 2017-01-01 RX ADMIN — Medication 10 ML: at 14:00

## 2017-01-01 RX ADMIN — HEPARIN SODIUM 5000 UNITS: 5000 INJECTION, SOLUTION INTRAVENOUS; SUBCUTANEOUS at 18:39

## 2017-01-01 RX ADMIN — BUMETANIDE 1 MG: 1 TABLET ORAL at 18:13

## 2017-01-01 RX ADMIN — CARVEDILOL 6.25 MG: 6.25 TABLET, FILM COATED ORAL at 16:08

## 2017-01-01 RX ADMIN — DOCUSATE SODIUM AND SENNOSIDES 1 TABLET: 8.6; 5 TABLET, FILM COATED ORAL at 23:07

## 2017-01-01 RX ADMIN — Medication 10 ML: at 07:13

## 2017-01-01 RX ADMIN — ISOSORBIDE DINITRATE 10 MG: 5 TABLET ORAL at 21:31

## 2017-01-01 RX ADMIN — ASPIRIN 81 MG: 81 TABLET, COATED ORAL at 09:15

## 2017-01-01 RX ADMIN — POTASSIUM CHLORIDE 20 MEQ: 750 TABLET, FILM COATED, EXTENDED RELEASE ORAL at 08:21

## 2017-01-01 RX ADMIN — POTASSIUM CHLORIDE 20 MEQ: 750 TABLET, FILM COATED, EXTENDED RELEASE ORAL at 18:13

## 2017-01-01 RX ADMIN — Medication 10 ML: at 14:30

## 2017-01-01 RX ADMIN — Medication 10 ML: at 13:16

## 2017-01-01 RX ADMIN — Medication 10 ML: at 06:19

## 2017-01-01 RX ADMIN — HEPARIN SODIUM 5000 UNITS: 5000 INJECTION, SOLUTION INTRAVENOUS; SUBCUTANEOUS at 19:56

## 2017-01-01 RX ADMIN — POTASSIUM CHLORIDE 20 MEQ: 750 TABLET, FILM COATED, EXTENDED RELEASE ORAL at 08:15

## 2017-01-01 RX ADMIN — IPRATROPIUM BROMIDE AND ALBUTEROL SULFATE 3 ML: .5; 3 SOLUTION RESPIRATORY (INHALATION) at 08:05

## 2017-01-01 RX ADMIN — Medication 10 ML: at 22:35

## 2017-01-01 RX ADMIN — ISOSORBIDE DINITRATE 10 MG: 10 TABLET ORAL at 21:36

## 2017-01-01 RX ADMIN — HEPARIN SODIUM 5000 UNITS: 5000 INJECTION, SOLUTION INTRAVENOUS; SUBCUTANEOUS at 22:45

## 2017-01-01 RX ADMIN — ISOSORBIDE DINITRATE 10 MG: 10 TABLET ORAL at 08:26

## 2017-01-01 RX ADMIN — METHYLPREDNISOLONE SODIUM SUCCINATE 60 MG: 125 INJECTION, POWDER, FOR SOLUTION INTRAMUSCULAR; INTRAVENOUS at 08:15

## 2017-01-01 RX ADMIN — HEPARIN SODIUM 5000 UNITS: 5000 INJECTION, SOLUTION INTRAVENOUS; SUBCUTANEOUS at 19:00

## 2017-01-01 RX ADMIN — HEPARIN SODIUM 5000 UNITS: 5000 INJECTION, SOLUTION INTRAVENOUS; SUBCUTANEOUS at 03:43

## 2017-01-01 RX ADMIN — FENOFIBRATE 48 MG: 48 TABLET ORAL at 11:04

## 2017-01-01 RX ADMIN — LIDOCAINE HYDROCHLORIDE 200 MG: 20 INJECTION, SOLUTION INFILTRATION; PERINEURAL at 14:00

## 2017-01-01 RX ADMIN — LEVOFLOXACIN 500 MG: 5 INJECTION, SOLUTION INTRAVENOUS at 18:59

## 2017-01-01 RX ADMIN — Medication 1 CAPSULE: at 09:26

## 2017-01-01 RX ADMIN — LEVOFLOXACIN 250 MG: 250 TABLET, FILM COATED ORAL at 20:29

## 2017-01-01 RX ADMIN — HYDRALAZINE HYDROCHLORIDE 25 MG: 50 TABLET, FILM COATED ORAL at 08:52

## 2017-01-01 RX ADMIN — POTASSIUM CHLORIDE 20 MEQ: 750 TABLET, FILM COATED, EXTENDED RELEASE ORAL at 18:58

## 2017-01-01 RX ADMIN — BUMETANIDE 1 MG: 1 TABLET ORAL at 17:50

## 2017-01-01 RX ADMIN — ISOSORBIDE DINITRATE 10 MG: 10 TABLET ORAL at 08:55

## 2017-01-01 RX ADMIN — CEFEPIME HYDROCHLORIDE 2 G: 2 INJECTION, POWDER, FOR SOLUTION INTRAVENOUS at 18:33

## 2017-01-01 RX ADMIN — ACETYLCYSTEINE 200 MG: 200 SOLUTION ORAL; RESPIRATORY (INHALATION) at 14:24

## 2017-01-01 RX ADMIN — SERTRALINE HYDROCHLORIDE 100 MG: 50 TABLET ORAL at 09:03

## 2017-01-01 RX ADMIN — CARVEDILOL 6.25 MG: 6.25 TABLET, FILM COATED ORAL at 08:55

## 2017-01-01 RX ADMIN — SODIUM BICARBONATE 100 MEQ: 84 INJECTION, SOLUTION INTRAVENOUS at 11:51

## 2017-01-01 RX ADMIN — HEPARIN SODIUM 5000 UNITS: 5000 INJECTION, SOLUTION INTRAVENOUS; SUBCUTANEOUS at 11:19

## 2017-01-01 RX ADMIN — METHYLPREDNISOLONE SODIUM SUCCINATE 40 MG: 125 INJECTION, POWDER, FOR SOLUTION INTRAMUSCULAR; INTRAVENOUS at 08:44

## 2017-01-01 RX ADMIN — IPRATROPIUM BROMIDE AND ALBUTEROL SULFATE 3 ML: .5; 3 SOLUTION RESPIRATORY (INHALATION) at 23:49

## 2017-01-01 RX ADMIN — ONDANSETRON 4 MG: 2 INJECTION INTRAMUSCULAR; INTRAVENOUS at 11:15

## 2017-01-01 RX ADMIN — IPRATROPIUM BROMIDE AND ALBUTEROL SULFATE 3 ML: .5; 3 SOLUTION RESPIRATORY (INHALATION) at 19:15

## 2017-01-01 RX ADMIN — HYDRALAZINE HYDROCHLORIDE 25 MG: 50 TABLET, FILM COATED ORAL at 08:21

## 2017-01-01 RX ADMIN — ALBUTEROL SULFATE 1 DOSE: 2.5 SOLUTION RESPIRATORY (INHALATION) at 15:25

## 2017-01-01 RX ADMIN — BUDESONIDE 500 MCG: 0.5 INHALANT RESPIRATORY (INHALATION) at 22:55

## 2017-01-01 RX ADMIN — POTASSIUM CHLORIDE 20 MEQ: 750 TABLET, FILM COATED, EXTENDED RELEASE ORAL at 19:07

## 2017-01-01 RX ADMIN — LORAZEPAM 1 MG: 2 INJECTION INTRAMUSCULAR; INTRAVENOUS at 05:00

## 2017-01-01 RX ADMIN — SERTRALINE HYDROCHLORIDE 100 MG: 50 TABLET ORAL at 08:46

## 2017-01-01 RX ADMIN — HYDRALAZINE HYDROCHLORIDE 25 MG: 25 TABLET, FILM COATED ORAL at 17:31

## 2017-01-01 RX ADMIN — ISOSORBIDE DINITRATE 5 MG: 5 TABLET ORAL at 22:32

## 2017-01-01 RX ADMIN — POTASSIUM CHLORIDE 20 MEQ: 750 TABLET, FILM COATED, EXTENDED RELEASE ORAL at 09:15

## 2017-01-01 RX ADMIN — Medication 10 ML: at 06:52

## 2017-01-01 RX ADMIN — HYDRALAZINE HYDROCHLORIDE 25 MG: 50 TABLET, FILM COATED ORAL at 21:19

## 2017-01-01 RX ADMIN — Medication 10 ML: at 20:17

## 2017-01-01 RX ADMIN — ASPIRIN 81 MG: 81 TABLET, COATED ORAL at 08:41

## 2017-01-01 RX ADMIN — PREDNISONE 30 MG: 20 TABLET ORAL at 08:47

## 2017-01-01 RX ADMIN — HYDROCORTISONE SODIUM SUCCINATE 50 MG: 100 INJECTION, POWDER, FOR SOLUTION INTRAMUSCULAR; INTRAVENOUS at 11:15

## 2017-01-01 RX ADMIN — POTASSIUM CHLORIDE 40 MEQ: 750 TABLET, FILM COATED, EXTENDED RELEASE ORAL at 17:30

## 2017-01-01 RX ADMIN — POTASSIUM CHLORIDE 20 MEQ: 750 TABLET, FILM COATED, EXTENDED RELEASE ORAL at 17:41

## 2017-01-01 RX ADMIN — SPIRONOLACTONE 25 MG: 25 TABLET, FILM COATED ORAL at 08:21

## 2017-01-01 RX ADMIN — HEPARIN SODIUM 5000 UNITS: 5000 INJECTION, SOLUTION INTRAVENOUS; SUBCUTANEOUS at 11:05

## 2017-01-01 RX ADMIN — IPRATROPIUM BROMIDE AND ALBUTEROL SULFATE 3 ML: .5; 3 SOLUTION RESPIRATORY (INHALATION) at 12:59

## 2017-01-01 RX ADMIN — BUDESONIDE 500 MCG: 0.5 INHALANT RESPIRATORY (INHALATION) at 07:26

## 2017-01-01 RX ADMIN — HYDRALAZINE HYDROCHLORIDE 25 MG: 50 TABLET, FILM COATED ORAL at 16:07

## 2017-01-01 RX ADMIN — CEFEPIME HYDROCHLORIDE 2 G: 2 INJECTION, POWDER, FOR SOLUTION INTRAVENOUS at 17:11

## 2017-01-01 RX ADMIN — Medication 10 ML: at 15:06

## 2017-01-01 RX ADMIN — MILRINONE LACTATE 0.2 MCG/KG/MIN: 200 INJECTION, SOLUTION INTRAVENOUS at 02:21

## 2017-01-01 RX ADMIN — VANCOMYCIN HYDROCHLORIDE 500 MG: 500 INJECTION, POWDER, LYOPHILIZED, FOR SOLUTION INTRAVENOUS at 11:44

## 2017-01-01 RX ADMIN — HEPARIN SODIUM 5000 UNITS: 5000 INJECTION, SOLUTION INTRAVENOUS; SUBCUTANEOUS at 14:05

## 2017-01-01 RX ADMIN — SERTRALINE HYDROCHLORIDE 100 MG: 50 TABLET ORAL at 09:14

## 2017-01-01 RX ADMIN — IPRATROPIUM BROMIDE AND ALBUTEROL SULFATE 3 ML: .5; 3 SOLUTION RESPIRATORY (INHALATION) at 04:53

## 2017-01-01 RX ADMIN — HEPARIN SODIUM 5000 UNITS: 5000 INJECTION, SOLUTION INTRAVENOUS; SUBCUTANEOUS at 21:28

## 2017-01-01 RX ADMIN — POTASSIUM CHLORIDE 20 MEQ: 750 TABLET, FILM COATED, EXTENDED RELEASE ORAL at 17:37

## 2017-01-01 RX ADMIN — FENOFIBRATE 48 MG: 48 TABLET ORAL at 09:15

## 2017-01-01 RX ADMIN — IPRATROPIUM BROMIDE AND ALBUTEROL SULFATE 3 ML: .5; 3 SOLUTION RESPIRATORY (INHALATION) at 08:13

## 2017-01-01 RX ADMIN — Medication 10 ML: at 22:00

## 2017-01-01 RX ADMIN — IPRATROPIUM BROMIDE AND ALBUTEROL SULFATE 3 ML: .5; 3 SOLUTION RESPIRATORY (INHALATION) at 08:41

## 2017-01-01 RX ADMIN — SERTRALINE HYDROCHLORIDE 100 MG: 50 TABLET ORAL at 09:26

## 2017-01-01 RX ADMIN — ACETYLCYSTEINE 400 MG: 200 SOLUTION ORAL; RESPIRATORY (INHALATION) at 12:59

## 2017-01-01 RX ADMIN — Medication 10 ML: at 13:39

## 2017-01-01 RX ADMIN — HEPARIN SODIUM 5000 UNITS: 5000 INJECTION, SOLUTION INTRAVENOUS; SUBCUTANEOUS at 21:19

## 2017-01-01 RX ADMIN — BUDESONIDE 500 MCG: 0.5 INHALANT RESPIRATORY (INHALATION) at 19:30

## 2017-01-01 RX ADMIN — BUDESONIDE 500 MCG: 0.5 INHALANT RESPIRATORY (INHALATION) at 08:03

## 2017-01-01 RX ADMIN — HYDRALAZINE HYDROCHLORIDE 25 MG: 50 TABLET, FILM COATED ORAL at 21:27

## 2017-01-01 RX ADMIN — CARVEDILOL 6.25 MG: 6.25 TABLET, FILM COATED ORAL at 09:04

## 2017-01-01 RX ADMIN — BUDESONIDE 500 MCG: 0.5 INHALANT RESPIRATORY (INHALATION) at 08:17

## 2017-01-01 RX ADMIN — ASPIRIN 81 MG: 81 TABLET, COATED ORAL at 08:21

## 2017-01-01 RX ADMIN — HEPARIN SODIUM 5000 UNITS: 5000 INJECTION, SOLUTION INTRAVENOUS; SUBCUTANEOUS at 22:34

## 2017-01-01 RX ADMIN — BUMETANIDE 1 MG: 1 TABLET ORAL at 08:52

## 2017-01-01 RX ADMIN — BUDESONIDE 500 MCG: 0.5 INHALANT RESPIRATORY (INHALATION) at 19:50

## 2017-01-01 RX ADMIN — Medication 10 ML: at 22:14

## 2017-01-01 RX ADMIN — ONDANSETRON 4 MG: 2 INJECTION INTRAMUSCULAR; INTRAVENOUS at 00:24

## 2017-01-01 RX ADMIN — FENOFIBRATE 48 MG: 48 TABLET ORAL at 09:00

## 2017-01-01 RX ADMIN — IPRATROPIUM BROMIDE AND ALBUTEROL SULFATE 3 ML: .5; 3 SOLUTION RESPIRATORY (INHALATION) at 19:49

## 2017-01-01 RX ADMIN — METHYLPREDNISOLONE SODIUM SUCCINATE 40 MG: 40 INJECTION, POWDER, FOR SOLUTION INTRAMUSCULAR; INTRAVENOUS at 14:00

## 2017-01-01 RX ADMIN — Medication 10 ML: at 23:11

## 2017-01-01 RX ADMIN — HYDRALAZINE HYDROCHLORIDE 25 MG: 25 TABLET, FILM COATED ORAL at 21:37

## 2017-01-01 RX ADMIN — ISOSORBIDE DINITRATE 10 MG: 10 TABLET ORAL at 11:04

## 2017-01-01 RX ADMIN — HYDRALAZINE HYDROCHLORIDE 25 MG: 50 TABLET, FILM COATED ORAL at 16:09

## 2017-01-01 RX ADMIN — ISOSORBIDE DINITRATE 10 MG: 10 TABLET ORAL at 16:14

## 2017-01-01 RX ADMIN — Medication 10 ML: at 22:27

## 2017-01-01 RX ADMIN — MILRINONE LACTATE 0.2 MCG/KG/MIN: 200 INJECTION, SOLUTION INTRAVENOUS at 19:55

## 2017-01-01 RX ADMIN — HEPARIN SODIUM 5000 UNITS: 5000 INJECTION, SOLUTION INTRAVENOUS; SUBCUTANEOUS at 20:29

## 2017-01-01 RX ADMIN — BUDESONIDE 500 MCG: 0.5 INHALANT RESPIRATORY (INHALATION) at 08:19

## 2017-01-01 RX ADMIN — CARVEDILOL 6.25 MG: 6.25 TABLET, FILM COATED ORAL at 19:56

## 2017-01-01 RX ADMIN — POTASSIUM CHLORIDE 20 MEQ: 750 TABLET, FILM COATED, EXTENDED RELEASE ORAL at 08:40

## 2017-01-01 RX ADMIN — ISOSORBIDE DINITRATE 10 MG: 5 TABLET ORAL at 08:21

## 2017-01-01 RX ADMIN — SERTRALINE HYDROCHLORIDE 100 MG: 50 TABLET ORAL at 08:21

## 2017-01-01 RX ADMIN — ALBUMIN (HUMAN) 12.5 G: 12.5 INJECTION, SOLUTION INTRAVENOUS at 17:31

## 2017-01-01 RX ADMIN — FENOFIBRATE 48 MG: 48 TABLET ORAL at 08:42

## 2017-01-01 RX ADMIN — ISOSORBIDE DINITRATE 10 MG: 10 TABLET ORAL at 17:00

## 2017-01-01 RX ADMIN — ASPIRIN 81 MG: 81 TABLET, COATED ORAL at 09:26

## 2017-01-01 RX ADMIN — ISOSORBIDE DINITRATE 10 MG: 10 TABLET ORAL at 17:31

## 2017-01-01 RX ADMIN — HYDRALAZINE HYDROCHLORIDE 25 MG: 25 TABLET, FILM COATED ORAL at 11:05

## 2017-01-01 RX ADMIN — METHYLPREDNISOLONE SODIUM SUCCINATE 40 MG: 40 INJECTION, POWDER, FOR SOLUTION INTRAMUSCULAR; INTRAVENOUS at 21:52

## 2017-01-01 RX ADMIN — ASPIRIN 81 MG: 81 TABLET, COATED ORAL at 08:52

## 2017-01-01 RX ADMIN — METHYLPREDNISOLONE SODIUM SUCCINATE 60 MG: 125 INJECTION, POWDER, FOR SOLUTION INTRAMUSCULAR; INTRAVENOUS at 08:49

## 2017-01-01 RX ADMIN — BUDESONIDE 500 MCG: 0.5 INHALANT RESPIRATORY (INHALATION) at 07:51

## 2017-01-01 RX ADMIN — METHYLPREDNISOLONE SODIUM SUCCINATE 40 MG: 40 INJECTION, POWDER, FOR SOLUTION INTRAMUSCULAR; INTRAVENOUS at 06:00

## 2017-01-01 RX ADMIN — BUDESONIDE 500 MCG: 0.5 INHALANT RESPIRATORY (INHALATION) at 08:05

## 2017-01-01 RX ADMIN — POTASSIUM CHLORIDE 20 MEQ: 750 TABLET, FILM COATED, EXTENDED RELEASE ORAL at 11:04

## 2017-01-01 RX ADMIN — FENOFIBRATE 48 MG: 48 TABLET ORAL at 09:04

## 2017-01-01 RX ADMIN — BUDESONIDE 500 MCG: 0.5 INHALANT RESPIRATORY (INHALATION) at 20:07

## 2017-01-01 RX ADMIN — IPRATROPIUM BROMIDE AND ALBUTEROL SULFATE 3 ML: .5; 3 SOLUTION RESPIRATORY (INHALATION) at 20:00

## 2017-01-01 RX ADMIN — FENOFIBRATE 48 MG: 48 TABLET ORAL at 08:21

## 2017-01-01 RX ADMIN — HEPARIN SODIUM 5000 UNITS: 5000 INJECTION, SOLUTION INTRAVENOUS; SUBCUTANEOUS at 14:18

## 2017-01-01 RX ADMIN — METHYLPREDNISOLONE SODIUM SUCCINATE 60 MG: 125 INJECTION, POWDER, FOR SOLUTION INTRAMUSCULAR; INTRAVENOUS at 01:57

## 2017-01-01 RX ADMIN — IPRATROPIUM BROMIDE AND ALBUTEROL SULFATE 3 ML: .5; 3 SOLUTION RESPIRATORY (INHALATION) at 08:06

## 2017-01-01 RX ADMIN — CARVEDILOL 6.25 MG: 6.25 TABLET, FILM COATED ORAL at 17:31

## 2017-01-01 RX ADMIN — AMIODARONE HYDROCHLORIDE 300 MG: 50 INJECTION, SOLUTION INTRAVENOUS at 11:56

## 2017-01-01 RX ADMIN — METHYLPREDNISOLONE SODIUM SUCCINATE 40 MG: 125 INJECTION, POWDER, FOR SOLUTION INTRAMUSCULAR; INTRAVENOUS at 16:09

## 2017-01-01 RX ADMIN — ISOSORBIDE DINITRATE 10 MG: 10 TABLET ORAL at 21:52

## 2017-01-01 RX ADMIN — HYDRALAZINE HYDROCHLORIDE 25 MG: 50 TABLET, FILM COATED ORAL at 22:45

## 2017-01-01 RX ADMIN — FENOFIBRATE 48 MG: 48 TABLET ORAL at 08:50

## 2017-01-01 RX ADMIN — ACETAMINOPHEN 650 MG: 325 TABLET, FILM COATED ORAL at 21:27

## 2017-01-01 RX ADMIN — BUMETANIDE 1 MG: 0.25 INJECTION INTRAMUSCULAR; INTRAVENOUS at 17:22

## 2017-01-01 RX ADMIN — BUDESONIDE 500 MCG: 0.5 INHALANT RESPIRATORY (INHALATION) at 21:07

## 2017-01-01 RX ADMIN — BUDESONIDE 500 MCG: 0.5 INHALANT RESPIRATORY (INHALATION) at 08:55

## 2017-01-01 RX ADMIN — ALBUTEROL SULFATE 2.5 MG: 2.5 SOLUTION RESPIRATORY (INHALATION) at 14:24

## 2017-01-01 RX ADMIN — HYDRALAZINE HYDROCHLORIDE 25 MG: 50 TABLET, FILM COATED ORAL at 16:03

## 2017-01-01 RX ADMIN — CARVEDILOL 6.25 MG: 6.25 TABLET, FILM COATED ORAL at 16:03

## 2017-01-01 RX ADMIN — IPRATROPIUM BROMIDE AND ALBUTEROL SULFATE 3 ML: .5; 3 SOLUTION RESPIRATORY (INHALATION) at 16:27

## 2017-01-01 RX ADMIN — POTASSIUM CHLORIDE 20 MEQ: 750 TABLET, FILM COATED, EXTENDED RELEASE ORAL at 17:22

## 2017-01-01 RX ADMIN — BUMETANIDE 1 MG: 0.25 INJECTION INTRAMUSCULAR; INTRAVENOUS at 21:03

## 2017-01-01 RX ADMIN — Medication 10 ML: at 06:14

## 2017-01-01 RX ADMIN — Medication 10 ML: at 08:51

## 2017-01-01 RX ADMIN — HEPARIN SODIUM 5000 UNITS: 5000 INJECTION, SOLUTION INTRAVENOUS; SUBCUTANEOUS at 19:54

## 2017-01-01 RX ADMIN — HYDRALAZINE HYDROCHLORIDE 25 MG: 50 TABLET, FILM COATED ORAL at 08:50

## 2017-01-01 RX ADMIN — VANCOMYCIN HYDROCHLORIDE 2000 MG: 10 INJECTION, POWDER, LYOPHILIZED, FOR SOLUTION INTRAVENOUS at 17:43

## 2017-01-01 RX ADMIN — Medication 10 ML: at 14:55

## 2017-01-01 RX ADMIN — HEPARIN SODIUM 5000 UNITS: 5000 INJECTION, SOLUTION INTRAVENOUS; SUBCUTANEOUS at 03:28

## 2017-01-01 RX ADMIN — BUDESONIDE 500 MCG: 0.5 INHALANT RESPIRATORY (INHALATION) at 21:20

## 2017-01-01 RX ADMIN — IPRATROPIUM BROMIDE AND ALBUTEROL SULFATE 3 ML: .5; 3 SOLUTION RESPIRATORY (INHALATION) at 08:17

## 2017-01-01 RX ADMIN — HYDRALAZINE HYDROCHLORIDE 25 MG: 25 TABLET, FILM COATED ORAL at 08:26

## 2017-01-01 RX ADMIN — CARVEDILOL 6.25 MG: 6.25 TABLET, FILM COATED ORAL at 17:16

## 2017-01-01 RX ADMIN — HYDRALAZINE HYDROCHLORIDE 25 MG: 50 TABLET, FILM COATED ORAL at 08:44

## 2017-01-01 RX ADMIN — HEPARIN SODIUM 5000 UNITS: 5000 INJECTION, SOLUTION INTRAVENOUS; SUBCUTANEOUS at 20:16

## 2017-01-01 RX ADMIN — HEPARIN SODIUM 5000 UNITS: 5000 INJECTION, SOLUTION INTRAVENOUS; SUBCUTANEOUS at 14:55

## 2017-01-01 RX ADMIN — METHYLPREDNISOLONE SODIUM SUCCINATE 40 MG: 125 INJECTION, POWDER, FOR SOLUTION INTRAMUSCULAR; INTRAVENOUS at 16:05

## 2017-01-01 RX ADMIN — ALBUMIN (HUMAN) 12.5 G: 12.5 INJECTION, SOLUTION INTRAVENOUS at 00:18

## 2017-01-01 RX ADMIN — ACETYLCYSTEINE 200 MG: 200 SOLUTION ORAL; RESPIRATORY (INHALATION) at 09:00

## 2017-01-01 RX ADMIN — HEPARIN SODIUM 5000 UNITS: 5000 INJECTION, SOLUTION INTRAVENOUS; SUBCUTANEOUS at 03:00

## 2017-01-01 RX ADMIN — SODIUM CHLORIDE 500 ML: 900 INJECTION, SOLUTION INTRAVENOUS at 16:42

## 2017-01-01 RX ADMIN — BUMETANIDE 1 MG: 0.25 INJECTION INTRAMUSCULAR; INTRAVENOUS at 22:17

## 2017-01-01 RX ADMIN — HEPARIN SODIUM 5000 UNITS: 5000 INJECTION, SOLUTION INTRAVENOUS; SUBCUTANEOUS at 06:17

## 2017-01-01 RX ADMIN — IPRATROPIUM BROMIDE AND ALBUTEROL SULFATE 3 ML: .5; 3 SOLUTION RESPIRATORY (INHALATION) at 08:55

## 2017-01-01 RX ADMIN — HYDRALAZINE HYDROCHLORIDE 25 MG: 50 TABLET, FILM COATED ORAL at 16:01

## 2017-01-01 RX ADMIN — Medication 10 ML: at 05:44

## 2017-01-01 RX ADMIN — CALCIUM CARBONATE (ANTACID) CHEW TAB 500 MG 200 MG: 500 CHEW TAB at 20:44

## 2017-01-01 RX ADMIN — BUMETANIDE 1 MG: 0.25 INJECTION INTRAMUSCULAR; INTRAVENOUS at 08:40

## 2017-01-01 RX ADMIN — HEPARIN SODIUM 5000 UNITS: 5000 INJECTION, SOLUTION INTRAVENOUS; SUBCUTANEOUS at 10:33

## 2017-01-01 RX ADMIN — BUMETANIDE 1 MG: 1 TABLET ORAL at 09:15

## 2017-01-01 RX ADMIN — Medication 10 ML: at 05:47

## 2017-01-01 RX ADMIN — HYDRALAZINE HYDROCHLORIDE 25 MG: 50 TABLET, FILM COATED ORAL at 22:32

## 2017-01-01 RX ADMIN — POTASSIUM CHLORIDE 20 MEQ: 750 TABLET, FILM COATED, EXTENDED RELEASE ORAL at 08:50

## 2017-01-01 RX ADMIN — IPRATROPIUM BROMIDE AND ALBUTEROL SULFATE 3 ML: .5; 3 SOLUTION RESPIRATORY (INHALATION) at 15:20

## 2017-01-01 RX ADMIN — HEPARIN SODIUM 5000 UNITS: 5000 INJECTION, SOLUTION INTRAVENOUS; SUBCUTANEOUS at 06:26

## 2017-01-01 RX ADMIN — ISOSORBIDE DINITRATE 5 MG: 5 TABLET ORAL at 10:36

## 2017-01-01 RX ADMIN — ALBUMIN (HUMAN) 12.5 G: 12.5 INJECTION, SOLUTION INTRAVENOUS at 05:08

## 2017-01-01 RX ADMIN — HEPARIN SODIUM 5000 UNITS: 5000 INJECTION, SOLUTION INTRAVENOUS; SUBCUTANEOUS at 21:31

## 2017-01-01 RX ADMIN — ACETYLCYSTEINE 400 MG: 200 SOLUTION ORAL; RESPIRATORY (INHALATION) at 08:17

## 2017-01-01 RX ADMIN — IPRATROPIUM BROMIDE AND ALBUTEROL SULFATE 3 ML: .5; 3 SOLUTION RESPIRATORY (INHALATION) at 17:04

## 2017-01-01 RX ADMIN — ISOSORBIDE DINITRATE 10 MG: 10 TABLET ORAL at 23:07

## 2017-01-01 RX ADMIN — PREDNISONE 40 MG: 20 TABLET ORAL at 08:21

## 2017-01-01 RX ADMIN — Medication 10 ML: at 05:05

## 2017-01-01 RX ADMIN — ISOSORBIDE DINITRATE 10 MG: 10 TABLET ORAL at 17:16

## 2017-01-01 RX ADMIN — CARVEDILOL 6.25 MG: 6.25 TABLET, FILM COATED ORAL at 09:14

## 2017-01-01 RX ADMIN — IPRATROPIUM BROMIDE AND ALBUTEROL SULFATE 3 ML: .5; 3 SOLUTION RESPIRATORY (INHALATION) at 08:56

## 2017-01-01 RX ADMIN — BUMETANIDE 1 MG: 0.25 INJECTION INTRAMUSCULAR; INTRAVENOUS at 08:50

## 2017-01-01 RX ADMIN — BUMETANIDE 1 MG: 0.25 INJECTION INTRAMUSCULAR; INTRAVENOUS at 11:05

## 2017-01-01 RX ADMIN — HEPARIN SODIUM 5000 UNITS: 5000 INJECTION, SOLUTION INTRAVENOUS; SUBCUTANEOUS at 14:50

## 2017-01-01 RX ADMIN — HYDRALAZINE HYDROCHLORIDE 25 MG: 25 TABLET, FILM COATED ORAL at 16:14

## 2017-01-01 RX ADMIN — METHYLPREDNISOLONE SODIUM SUCCINATE 40 MG: 125 INJECTION, POWDER, FOR SOLUTION INTRAMUSCULAR; INTRAVENOUS at 09:16

## 2017-01-01 RX ADMIN — POTASSIUM CHLORIDE 10 MEQ: 7.46 INJECTION, SOLUTION INTRAVENOUS at 08:40

## 2017-01-01 RX ADMIN — BUDESONIDE 500 MCG: 0.5 INHALANT RESPIRATORY (INHALATION) at 20:00

## 2017-01-01 RX ADMIN — IPRATROPIUM BROMIDE AND ALBUTEROL SULFATE 3 ML: .5; 3 SOLUTION RESPIRATORY (INHALATION) at 07:51

## 2017-01-01 RX ADMIN — HEPARIN SODIUM 5000 UNITS: 5000 INJECTION, SOLUTION INTRAVENOUS; SUBCUTANEOUS at 05:47

## 2017-01-01 RX ADMIN — Medication 20 ML: at 11:51

## 2017-01-01 RX ADMIN — IPRATROPIUM BROMIDE AND ALBUTEROL SULFATE 3 ML: .5; 3 SOLUTION RESPIRATORY (INHALATION) at 07:19

## 2017-01-01 RX ADMIN — CARVEDILOL 6.25 MG: 6.25 TABLET, FILM COATED ORAL at 16:01

## 2017-01-01 RX ADMIN — HYDRALAZINE HYDROCHLORIDE 25 MG: 25 TABLET, FILM COATED ORAL at 17:00

## 2017-01-01 RX ADMIN — CARVEDILOL 6.25 MG: 6.25 TABLET, FILM COATED ORAL at 08:40

## 2017-01-01 RX ADMIN — EPINEPHRINE 1 MG: 0.1 INJECTION INTRACARDIAC; INTRAVENOUS at 11:55

## 2017-01-01 RX ADMIN — BUDESONIDE 500 MCG: 0.5 INHALANT RESPIRATORY (INHALATION) at 08:41

## 2017-01-01 RX ADMIN — FENOFIBRATE 48 MG: 48 TABLET ORAL at 08:15

## 2017-01-01 RX ADMIN — METHYLPREDNISOLONE SODIUM SUCCINATE 40 MG: 40 INJECTION, POWDER, FOR SOLUTION INTRAMUSCULAR; INTRAVENOUS at 21:37

## 2017-01-01 RX ADMIN — MILRINONE LACTATE 0.2 MCG/KG/MIN: 200 INJECTION, SOLUTION INTRAVENOUS at 11:25

## 2017-01-01 RX ADMIN — ISOSORBIDE DINITRATE 10 MG: 5 TABLET ORAL at 16:07

## 2017-01-01 RX ADMIN — HYDRALAZINE HYDROCHLORIDE 25 MG: 25 TABLET, FILM COATED ORAL at 23:07

## 2017-01-01 RX ADMIN — METHYLPREDNISOLONE SODIUM SUCCINATE 60 MG: 125 INJECTION, POWDER, FOR SOLUTION INTRAMUSCULAR; INTRAVENOUS at 16:01

## 2017-01-01 RX ADMIN — HEPARIN SODIUM 5000 UNITS: 5000 INJECTION, SOLUTION INTRAVENOUS; SUBCUTANEOUS at 16:04

## 2017-01-01 RX ADMIN — POTASSIUM CHLORIDE 20 MEQ: 750 TABLET, FILM COATED, EXTENDED RELEASE ORAL at 08:52

## 2017-01-01 RX ADMIN — CARVEDILOL 6.25 MG: 6.25 TABLET, FILM COATED ORAL at 16:09

## 2017-01-01 RX ADMIN — DOCUSATE SODIUM AND SENNOSIDES 1 TABLET: 8.6; 5 TABLET, FILM COATED ORAL at 22:48

## 2017-01-01 RX ADMIN — ALBUMIN (HUMAN) 12.5 G: 12.5 INJECTION, SOLUTION INTRAVENOUS at 13:12

## 2017-01-01 RX ADMIN — FENOFIBRATE 48 MG: 48 TABLET ORAL at 09:03

## 2017-01-01 RX ADMIN — Medication 10 ML: at 21:42

## 2017-01-01 RX ADMIN — HEPARIN SODIUM 5000 UNITS: 5000 INJECTION, SOLUTION INTRAVENOUS; SUBCUTANEOUS at 03:21

## 2017-01-01 RX ADMIN — POLYETHYLENE GLYCOL 3350 17 G: 17 POWDER, FOR SOLUTION ORAL at 11:15

## 2017-01-01 RX ADMIN — CARVEDILOL 6.25 MG: 6.25 TABLET, FILM COATED ORAL at 08:44

## 2017-01-01 RX ADMIN — EPINEPHRINE 1 MG: 0.1 INJECTION INTRACARDIAC; INTRAVENOUS at 11:49

## 2017-01-01 RX ADMIN — Medication 10 ML: at 00:07

## 2017-01-01 RX ADMIN — DOCUSATE SODIUM AND SENNOSIDES 1 TABLET: 8.6; 5 TABLET, FILM COATED ORAL at 21:52

## 2017-01-01 RX ADMIN — Medication 10 ML: at 16:06

## 2017-01-01 RX ADMIN — CARVEDILOL 6.25 MG: 6.25 TABLET, FILM COATED ORAL at 11:04

## 2017-01-01 RX ADMIN — HYDRALAZINE HYDROCHLORIDE 25 MG: 25 TABLET, FILM COATED ORAL at 08:41

## 2017-01-01 RX ADMIN — Medication 10 ML: at 21:19

## 2017-01-01 RX ADMIN — ISOSORBIDE DINITRATE 10 MG: 5 TABLET ORAL at 08:53

## 2017-01-01 RX ADMIN — METHYLPREDNISOLONE SODIUM SUCCINATE 40 MG: 125 INJECTION, POWDER, FOR SOLUTION INTRAMUSCULAR; INTRAVENOUS at 02:05

## 2017-01-01 RX ADMIN — BUMETANIDE 1 MG: 0.25 INJECTION INTRAMUSCULAR; INTRAVENOUS at 16:01

## 2017-01-01 RX ADMIN — POTASSIUM CHLORIDE 20 MEQ: 750 TABLET, FILM COATED, EXTENDED RELEASE ORAL at 17:50

## 2017-01-01 RX ADMIN — HEPARIN SODIUM 4000 UNITS: 1000 INJECTION, SOLUTION INTRAVENOUS; SUBCUTANEOUS at 14:00

## 2017-01-01 RX ADMIN — Medication 10 ML: at 05:33

## 2017-01-01 RX ADMIN — IPRATROPIUM BROMIDE AND ALBUTEROL SULFATE 3 ML: .5; 3 SOLUTION RESPIRATORY (INHALATION) at 19:50

## 2017-01-01 RX ADMIN — ASPIRIN 81 MG: 81 TABLET, COATED ORAL at 08:53

## 2017-01-01 RX ADMIN — EPINEPHRINE 1 MG: 0.1 INJECTION INTRACARDIAC; INTRAVENOUS at 11:52

## 2017-01-01 RX ADMIN — CARVEDILOL 6.25 MG: 6.25 TABLET, FILM COATED ORAL at 08:50

## 2017-09-14 PROBLEM — R06.02 SOB (SHORTNESS OF BREATH): Status: ACTIVE | Noted: 2017-01-01

## 2017-09-14 NOTE — ED NOTES
Pt had two episodes of 5-10 beat run of vtach per cardiac monitor. Pt c/o light headedness and head pounding during these episodes, denies cp or sob. Rythmn strips printed and given to MD. Will attempt to capture the next episode with EKG. Nurse and code cart at bedside. Pt resting comfortably and denies any needs at this time.

## 2017-09-14 NOTE — PROGRESS NOTES
Admission Medication Reconciliation:    Information obtained from: patient, rx query, medication bottles    Significant PMH/Disease States:   Past Medical History:   Diagnosis Date    Chronic obstructive pulmonary disease ([facility] [address]     Hypertension        Chief Complaint for this Admission:  SOB    Allergies:  Review of patient's allergies indicates no known allergies. Prior to Admission Medications:   Prior to Admission Medications   Prescriptions Last Dose Informant Patient Reported? Taking?   aspirin delayed-release 81 mg tablet 9/13/2017 at Unknown time  Yes Yes   Sig: Take 81 mg by mouth daily. bumetanide (BUMEX) 1 mg tablet 9/13/2017 at Unknown time  Yes Yes   Sig: Take 3 mg by mouth daily. fenofibrate nanocrystallized (TRICOR) 48 mg tablet 9/13/2017 at Unknown time  Yes Yes   Sig: Take 48 mg by mouth daily. lisinopril (PRINIVIL, ZESTRIL) 2.5 mg tablet 8/16/2017 at Unknown time  Yes Yes   Sig: Take 2.5 mg by mouth daily. metoprolol tartrate (LOPRESSOR) 25 mg tablet 9/13/2017 at Unknown time  Yes Yes   Sig: Take 25 mg by mouth two (2) times a day. sertraline (ZOLOFT) 100 mg tablet 9/13/2017 at Unknown time  Yes Yes   Sig: Take 100 mg by mouth daily. Facility-Administered Medications: None       Comments/Recommendations:   Patient has been out of lisinopril for ~1 month (states that MD won't refill it until she sees him, but she doesn't have a way to get to the appointment). Patient also states that she doesn't take bumetanide how it is prescribed; she is supposed to take 3mg BID, but only takes 3mg once daily. Thank you for allowing me to participate in the care of this patient. The above list now represents the patient's most up-to-date PTA medication list.  Please call the inpatient pharmacy at [phone] with any questions. Sabina Roman D.  Candidate 2018

## 2017-09-14 NOTE — ED TRIAGE NOTES
TRIAGE: Pt arrives via EMS from home with c/o shortness of breath starting yesterday. Pt wheezing and has history of COPD and usually wears 3L NC oxygenating upon arrival at 84%. Pt denies pain or dizziness. Usually goes to [facility] and had pacemaker placed in November.  1 duoneb given in route

## 2017-09-14 NOTE — Clinical Note
Status[de-identified] Inpatient [101] Type of Bed: Telemetry [19] Inpatient Hospitalization Certified Necessary for the Following Reasons: 9. Other (further clarification in H&P documentation) Admitting Diagnosis: SOB (shortness of breath) [581837] Admitting Physician: Erica Dhillon [[id number]] Attending Physician: Ritika Jang Estimated Length of Stay: 3-4 Midnights Discharge Plan[de-identified] Home with Office Follow-up

## 2017-09-14 NOTE — CONSULTS
Cardiac Electrophysiology Consultation Note     Subjective:      Monica Douglas is a 58 y.o. patient who is seen for evaluation of ventricular tachycardia  Dr Emerson Loyola (ER) and Dr Satya Parry (hospitalist) asked me to evaluate her  She has dilated cardiomyopathy and had dual chamber Medtronic ICD implanted at [facility] in 11/2016  She said she had this done at [facility] then but never came back for follow up because the procedure was done 3 times and she did not think sedatives were given to her adequately  She has had about 1 week of dyspnea and orthopnea  No chest pain  She has chronic leg edema  She had stopped smoking 30 years ago  No syncope  ECG sinus tachycardia with LBBB and right axis deviation  In September ECG was LBBB and left axis deviation  Creatinine 1.7  Troponin 0.06  K 3.4  Mag 1.9    She is on metoprolol but not coreg or toprol XL  She is on low dose lisinopril 2.5 mg  She was supposed to take 3 mg bumex bid but she said she takes 3 mg every day      Patient Active Problem List   Diagnosis Code    SOB (shortness of breath) R06.02     Current Facility-Administered Medications   Medication Dose Route Frequency Provider Last Rate Last Dose    albuterol-ipratropium (DUO-NEB) 2.5 MG-0.5 MG/3 ML  3 mL Nebulization Q4H RT Rishabh Virgen MD   3 mL at 09/14/17 1824    [START ON 9/15/2017] methylPREDNISolone (PF) (SOLU-MEDROL) injection 60 mg  60 mg IntraVENous Q8H Rishabh Virgen MD         Current Outpatient Prescriptions   Medication Sig Dispense Refill    lisinopril (PRINIVIL, ZESTRIL) 2.5 mg tablet Take 2.5 mg by mouth daily.  metoprolol tartrate (LOPRESSOR) 25 mg tablet Take 25 mg by mouth two (2) times a day.  bumetanide (BUMEX) 1 mg tablet Take 3 mg by mouth daily.  sertraline (ZOLOFT) 100 mg tablet Take 100 mg by mouth daily.  aspirin delayed-release 81 mg tablet Take 81 mg by mouth daily.  fenofibrate nanocrystallized (TRICOR) 48 mg tablet Take 48 mg by mouth daily.        No Known Allergies  Past Medical History:   Diagnosis Date    Chronic obstructive pulmonary disease ([facility] [address]     Hypertension      Past Surgical History:   Procedure Laterality Date    HX PACEMAKER       No CHF family history. Social History   Substance Use Topics    Smoking status: Former Smoker    Smokeless tobacco: Never Used    Alcohol use No        Review of Systems:   Constitutional: Negative for fever, chills, weight loss, + malaise/fatigue. HEENT: Negative for nosebleeds, vision changes. Respiratory: Negative for cough, hemoptysis  Cardiovascular: Negative for chest pain, palpitations, + orthopnea, no claudication, + leg swelling, no syncope, and + PND. Gastrointestinal: Negative for nausea, vomiting, diarrhea, blood in stool and melena. Genitourinary: Negative for dysuria, and hematuria. Musculoskeletal: Negative for myalgias, + arthralgia. Skin: + for rash. Heme: Does not bleed or bruise easily. Neurological: Negative for speech change and focal weakness     Objective:     Visit Vitals    /76    Pulse 87    Temp 98.1 °F (36.7 °C)    Resp 26    Wt (!) 443 lb 11.2 oz (201.3 kg)    SpO2 98%      Physical Exam:   Constitutional: well-developed and well-nourished. No respiratory distress but has to sit up at bedside. Head: Normocephalic and atraumatic. Eyes: Pupils are equal, round  ENT: hearing normal  Neck: supple. No JVD present. Cardiovascular: Normal rate, regular rhythm. Exam reveals no gallop and no friction rub. No murmur heard. Pulmonary/Chest: Effort normal  + expiratory wheezes. Abdominal: morbidly obese  Musculoskeletal: 3+ edema. Neurological: alert,oriented. Skin: legs with venous dermatitis  Psychiatric: normal mood and affect.  Behavior is normal. Judgment and thought content normal.         Assessment/Plan:   Acute on chronic congestive heart failure, likely systolic from dilated cardiomyopathy  She does not know if she had been getting cardiac cath before so will need records from [facility]  -she has been on short acting metoprolol and takes half dose of bumex    BP is high-will hold lisinopril low dose as creatinine is elevated. Add hydralazine and give coreg    NSVT from what ER nurse described. This is due to acute systolic CHF decompensation. Need to replace potassium    Acute renal failure-no baseline lab to compare. Will hold lisinopril    AICD dual chamber Medtronic- implanted at [facility] in 11/2016 but she refused to go back to [facility] to get it checked due to bad experience with implant  Will ask [employer] to check tomorrow and I will continue to care for this     LBBB: may need BIV ICD upgrade, will get new 2 D echo    COPD ? PFT. Agree with steroid and duonebs  Will need PFT    Right axis deviation-new: agree with VQ scan in presence of acute renal failure to rule out pulmonary embolism    Troponin is mildly elevated 0.06 and this could be from CHF, not acute NSTEMI    Morbid Obesity- will be difficult to manage and would not be candidate for LVAD  Will need to lose weight via diet and need dietary consult         Thank you for involving me in this patient's care and please call with further concerns or questions. Brianne Carrillo M.D.   Electrophysiology/Cardiology  [facility] [place]  [address] , Kamaljit Carrera [address] [place] [address]  [phone]

## 2017-09-14 NOTE — H&P
[address] [place] [address]   HISTORY AND PHYSICAL       Name:  Sarah Jimenez   MR#:  [id number]   :  1955   Account #:  [de-identified]        Date of Adm:  2017       CHIEF COMPLAINT: Shortness of breath. HISTORY OF PRESENT ILLNESS: The patient is a 58 year   old morbidly obese female with past medical history of COPD, obesity,   hypertension, and heart failure, who presents to the hospital   complaining of shortness of breath. The patient reports that she usually   goes to [facility] and has had \"fluid drained from her abdomen in the past\"   when she would have her belly distention. The patient reports that   about a week back, she started getting more shortness of breath than   usual. It exacerbated yesterday when she started having wheezing   associated with shortness of breath. The patient reports that she was   \"tight all over\" and felt that she was wheezing quite a bit and got   concerned and decided to come to the hospital. The patient usually   wears 3 liters oxygen at home and is able to function with the same. The patient reports that her abdomen has been getting bigger than   usual. She reports that her last paracentesis was about a year ago, but   she was not told why she has fluid build up in her lungs. The patient   reports that she has been taking \"fluid pills\" on a regular basis. The   patient has a history of obstructive sleep apnea and wears a CPAP at   night and lives at [address] [place] [address] with her . Denies any other complaints or problems. The patient denies any   headache, blurry vision, sore throat, trouble swallowing, trouble with   speech. Denies any chest pain, lightheadedness, dizziness,   constipation, diarrhea, urinary symptoms, focal or generalized   neurological weakness, recent travels or sick contacts.  The patient   reports that her legs have been getting more swollen than usual. The   patient also denies any hematemesis, melena, hemoptysis, any trauma   or falls. The patient reports that she has AICD that was placed in 2016   at [facility]. The patient in the ER appeared to have multiple episodes of   nonsustained wide complex tachycardia, but was asymptomatic with   the same. PAST MEDICAL HISTORY: See above. HOME MEDICATIONS   1. Lisinopril 2.5 mg daily. 2. Metoprolol 25 mg b.i.d.   3. Bumex 3 mg daily. 4. Zoloft 100 mg daily. 5. Aspirin 81 mg daily. 6. Tricor 48 mcg daily. SOCIAL HISTORY: The patient is a former smoker. No alcohol. No IV   drug abuse. Lives at home. ALLERGIES: NO KNOWN DRUG ALLERGIES. FAMILY HISTORY: Was discussed, was found to be not pertinent to   the present admission. The patient reports that there is no history of   congestive heart failure in the family. REVIEW OF SYSTEMS: All systems reviewed and were found to be   essentially negative except for the symptoms mentioned above. ALLERGIES: NO KNOWN DRUG ALLERGIES. PHYSICAL EXAMINATION   VITAL SIGNS: Temperature 98.1, pulse 87, respiratory rate 26, blood   pressure 150/76, pulse oximetry 91% on 3 liters. GENERAL: Alert and oriented x3, awake, mildly distressed, pleasant   female, appears to be stated age. HEENT: Pupils equal and reactive to light. Dry mucous membranes. Tympanic membranes clear. NECK: Supple. CHEST: Decreased basal breath sounds, scattered wheezes   throughout lung fields, difficult exam secondary to body habitus. CORONARY: Tachycardic. ABDOMEN: Soft, distended, obese. Bowel sounds hypoactive. EXTREMITIES: No clubbing, no cyanosis. Chronic venous changes   with chronic edema. NEUROPSYCHIATRIC: Pleasant mood and affect, no focal   neurological deficits were noted. SKIN: Warm. Chronic venous stasis in bilateral lower extremities. No   other rashes were appreciated. LABS: White count 7.7, hemoglobin 14.1, hematocrit 45.1, platelets   128.  Sodium 139, potassium 3.4, chloride 94, bicarbonate 37, anion   gap 8, glucose 110, BUN 38, creatinine 1.72, calcium 9.3, bilirubin total   1.7, ALT 21, AST 33, alkaline phosphatase 29. Troponin 0.06. ABG   shows a pH of 7.379, PCO2 of 69.2, pO2 of 64. X-ray of the chest   shows significant cardiomegaly with pulmonary vascular congestion   mild edema. Ultrasound of the abdomen shows edema in the soft   tissue of the right lower abdominal wall. EKG shows wide QRS   tachycardia. ASSESSMENT AND PLAN   1. Shortness of breath. Appears to be most likely secondary to   congestive heart failure exacerbation. Last echo done, per chart, in    showed an EF of 30-45%. The patient will be admitted to the   intensive care unit. Will put the patient on BiPAP for acute respiratory   failure. Will start the patient on intravenous diuretics. There may be a   component of chronic obstructive pulmonary disease exacerbation,   especially with wheezing and thus will provide 1 dose of steroid and   may continue the doses in the morning. Will do strict intake and output,   daily weights. Continue aspirin, continue Tricor. I spoke with Dr. Carisa Mcmanus   from cardiology who will evaluate the patient and his recommendations   will be incorporated. Will get an echocardiogram and further   intervention will be per hospital course. Will obtain records from [facility],   reassess as needed. The patient will be admitted to the intensive care   unit. 2. Wide complex tachycardia, appears to be nonsustained ventricular   tachycardia. Currently, asymptomatic. Magnesium has been ordered. Cardiology consult has been requested. Will defer to Cardiology for   further management. I will get troponin levels, get an echocardiogram   and may consider starting the patient on adenosine. Will await   Cardiology recommendations and further intervention will be per   hospital course. Reassess as needed and continue to closely monitor. The patient will be monitored in the intensive care unit setting.    3. Hyperbilirubinemia, unclear what baseline is. The patient denies any   pain and discomfort. Ultrasound of the belly is negative for any acute   pathology. Will monitor and trend. Further intervention will be per   hospital course. 4. Hypokalemia. Replace potassium. 5. Acute on chronic respiratory failure, hypercarbic and hypoxic. Please see above. The patient will be monitored in the intensive care   unit setting with BiPAP. Most likely secondary to congestive heart   failure exacerbation and volume overload with superimposed chronic   obstructive pulmonary disease. Please see management as above. 6. Gastrointestinal and deep venous thrombosis prophylaxis. The   patient will be on heparin.         Lino Lewis MD MM / MP   D:  09/14/2017   17:32   T:  09/14/2017   18:25   Job #:  [id number]

## 2017-09-14 NOTE — ED PROVIDER NOTES
HPI Comments: 58 y.o. female with past medical history significant for COPD, obesity, HTN, and heart failure who presents from home via EMS with chief complaint of shortness of breath. Patient complains of shortness of breath and wheezing for two days. She usually wears 3L NC continuously at home. She gets short of breath walking 20 feet. Patient reports fluid in her abdomen and is unsure why. She says she had a paracentesis at [facility] one year ago and it was unclear why she had fluid build up. She takes fluid pill. She wears CPAP at night. Patient lives in [facility] independent living with her . they just  but they still live together due to money issues. No abdominal pain. No hx of blockages. There are no other acute medical concerns at this time. Old Chart Review: Echo in 2014 showed EF 30-45%. Social hx: Former smoker. No alcohol use. PCP: Ana Boston MD    Note written by Nitin Pendleton, as dictated by Larnell Opitz, MD 2:50 PM      The history is provided by the patient. Past Medical History:   Diagnosis Date    Chronic obstructive pulmonary disease ([address]     Hypertension        Past Surgical History:   Procedure Laterality Date    HX PACEMAKER           History reviewed. No pertinent family history. Social History     Social History    Marital status: N/A     Spouse name: N/A    Number of children: N/A    Years of education: N/A     Occupational History    Not on file. Social History Main Topics    Smoking status: Former Smoker    Smokeless tobacco: Never Used    Alcohol use No    Drug use: Not on file    Sexual activity: Not on file     Other Topics Concern    Not on file     Social History Narrative    No narrative on file         ALLERGIES: Review of patient's allergies indicates no known allergies. Review of Systems   Constitutional: Negative for appetite change, chills and fever. HENT: Negative for congestion.     Eyes: Negative for visual disturbance. Respiratory: Positive for shortness of breath and wheezing. Negative for cough and chest tightness. Cardiovascular: Negative for chest pain. Gastrointestinal: Positive for abdominal distention. Negative for abdominal pain, diarrhea and vomiting. Genitourinary: Negative for dysuria and frequency. Musculoskeletal: Negative for joint swelling. Skin: Negative for rash. Neurological: Negative for speech difficulty and headaches. All other systems reviewed and are negative. Vitals:    09/14/17 1404   BP: 159/80   Pulse: 96   Resp: 25   Temp: 98.1 °F (36.7 °C)   SpO2: (!) 84%   Weight: (!) 201.3 kg (443 lb 11.2 oz)            Physical Exam   Constitutional: She is oriented to person, place, and time. She appears well-developed and well-nourished. No distress. HENT:   Head: Normocephalic and atraumatic. Nose: Nose normal.   Eyes: Conjunctivae are normal. Pupils are equal, round, and reactive to light. No scleral icterus. Neck: Normal range of motion. Neck supple. No JVD present. No tracheal deviation present. No thyromegaly present. Cardiovascular: Normal rate, regular rhythm and normal heart sounds. No murmur heard. Pulmonary/Chest: Effort normal and breath sounds normal. No respiratory distress. She has no wheezes. She has no rales. Lung sounds difficult to hear. Abdominal: Soft. Bowel sounds are normal. She exhibits distension. She exhibits no mass. There is no tenderness. There is no rebound and no guarding. Unable to sit up on her own without assistance because of large abdomen. Musculoskeletal: Normal range of motion. She exhibits no edema. No edema in extremities. Neurological: She is alert and oriented to person, place, and time. No cranial nerve deficit. Coordination normal.   Skin: Skin is warm and dry. No rash noted. She is not diaphoretic. No erythema. Psychiatric: She has a normal mood and affect.  Her behavior is normal.   Nursing note and vitals reviewed. Note written by Nitin Veliz, as dictated by Rafita Mansfield MD 2:50 PM    MDM  Number of Diagnoses or Management Options  established and worsening  established and worsening  established and worsening  new and requires workup  established and worsening  minor  new and requires workup  new and requires workup     Amount and/or Complexity of Data Reviewed  Clinical lab tests: ordered and reviewed  Tests in the radiology section of CPT®: ordered and reviewed  Tests in the medicine section of CPT®: ordered and reviewed  Discussion of test results with the performing providers: yes  Decide to obtain previous medical records or to obtain history from someone other than the patient: yes  Obtain history from someone other than the patient: yes (Her family did not arrive until after she was admitted. Information was obtained from [facility] with [facility])  Review and summarize past medical records: yes  Discuss the patient with other providers: yes  Independent visualization of images, tracings, or specimens: yes    Risk of Complications, Morbidity, and/or Mortality  Presenting problems: high  Diagnostic procedures: high  Management options: high  General comments: Total critical care time was 100 min    Critical Care  Total time providing critical care:  minutes    Patient Progress  Patient progress: stable    ED Course       Procedures    PROGRESS NOTE:  3:52 PM  Patient had an arrhythmia, appears to be unsustained V tach    PROGRESS NOTE:  4:01 PM  US done. No ascites seen. CONSULT NOTE:  4:00 PM Rafita Mansfield MD spoke with Dr. Ata Alfaro, Consult for Hospitalist.  Discussed available diagnostic tests and clinical findings. He is in agreement with care plans as outlined. Dr. Ata Alfaro will see and admit the patient.

## 2017-09-14 NOTE — IP AVS SNAPSHOT
[address] 
[phone] Patient: Shelby Farrar MRN: [id number] FUV:8/11/2012 Current Discharge Medication List  
  
START taking these medications Dose & Instructions Dispensing Information Comments Morning Noon Evening Bedtime  
 albuterol-ipratropium 2.5 mg-0.5 mg/3 ml Nebu Commonly known as:  Antwan Malik Your last dose was: Your next dose is:    
   
   
 Dose:  3 mL  
3 mL by Nebulization route every four (4) hours as needed. Quantity:  30 Nebule Refills:  2  
     
   
   
   
  
 budesonide 0.5 mg/2 mL Nbsp Commonly known as:  PULMICORT Your last dose was: Your next dose is:    
   
   
 Dose:  500 mcg 2 mL by Nebulization route two (2) times a day. Quantity:  120 mL Refills:  0  
     
   
   
   
  
 carvedilol 6.25 mg tablet Commonly known as:  Jeremy Kim Your last dose was: Your next dose is:    
   
   
 Dose:  6.25 mg Take 1 Tab by mouth two (2) times daily (with meals). Quantity:  60 Tab Refills:  3  
     
   
   
   
  
 guaiFENesin  mg ER tablet Commonly known as:  [employer] Your last dose was: Your next dose is:    
   
   
 Dose:  600 mg Take 1 Tab by mouth every twelve (12) hours. Quantity:  60 Tab Refills:  0  
     
   
   
   
  
 hydrALAZINE 25 mg tablet Commonly known as:  APRESOLINE Your last dose was: Your next dose is:    
   
   
 Dose:  25 mg Take 1 Tab by mouth three (3) times daily. Quantity:  90 Tab Refills:  3  
     
   
   
   
  
 isosorbide dinitrate 10 mg tablet Commonly known as:  ISORDIL Your last dose was: Your next dose is:    
   
   
 Dose:  10 mg Take 1 Tab by mouth three (3) times daily. Quantity:  90 Tab Refills:  3  
     
   
   
   
  
 potassium chloride SR 20 mEq tablet Commonly known as:  K-TAB Your last dose was: Your next dose is:    
   
   
 Dose:  20 mEq Take 1 Tab by mouth two (2) times a day. Quantity:  60 Tab Refills:  1  
     
   
   
   
  
 predniSONE 10 mg tablet Commonly known as:  Doug Pritchett Your last dose was: Your next dose is: Take 4 tabs daily X 3 days, then Take 3 tabs X 3 days, then 2 tabs X 3 days, then 1 tab X 3 days. Quantity:  30 Tab Refills:  0  
     
   
   
   
  
 spironolactone 25 mg tablet Commonly known as:  ALDACTONE Your last dose was: Your next dose is:    
   
   
 Dose:  25 mg Take 1 Tab by mouth daily. Quantity:  30 Tab Refills:  3 CONTINUE these medications which have CHANGED Dose & Instructions Dispensing Information Comments Morning Noon Evening Bedtime  
 bumetanide 1 mg tablet Commonly known as:  Wanda Hendricks What changed:   
- how much to take - when to take this Your last dose was: Your next dose is:    
   
   
 Dose:  1 mg Take 1 Tab by mouth two (2) times a day. Quantity:  60 Tab Refills:  3 CONTINUE these medications which have NOT CHANGED Dose & Instructions Dispensing Information Comments Morning Noon Evening Bedtime  
 aspirin delayed-release 81 mg tablet Your last dose was: Your next dose is:    
   
   
 Dose:  81 mg Take 81 mg by mouth daily. Refills:  0  
     
   
   
   
  
 fenofibrate nanocrystallized 48 mg tablet Commonly known as:  [employer] Your last dose was: Your next dose is:    
   
   
 Dose:  48 mg Take 48 mg by mouth daily. Refills:  0  
     
   
   
   
  
 sertraline 100 mg tablet Commonly known as:  ZOLOFT Your last dose was: Your next dose is:    
   
   
 Dose:  100 mg Take 1 Tab by mouth daily. Quantity:  15 Tab Refills:  0 STOP taking these medications Fenofibrate 40 mg Tab lisinopril 2.5 mg tablet Commonly known as:  PRINIVIL, ZESTRIL  
   
  
 metoprolol tartrate 25 mg tablet Commonly known as:  LOPRESSOR Where to Get Your Medications Information on where to get these meds will be given to you by the nurse or doctor. ! Ask your nurse or doctor about these medications  
  albuterol-ipratropium 2.5 mg-0.5 mg/3 ml Nebu  
 budesonide 0.5 mg/2 mL Nbsp  
 bumetanide 1 mg tablet  
 carvedilol 6.25 mg tablet  
 guaiFENesin  mg ER tablet  
 hydrALAZINE 25 mg tablet  
 isosorbide dinitrate 10 mg tablet  
 potassium chloride SR 20 mEq tablet  
 predniSONE 10 mg tablet  
 sertraline 100 mg tablet  
 spironolactone 25 mg tablet

## 2017-09-14 NOTE — IP AVS SNAPSHOT
Summary of Care Report The Summary of Care report has been created to help improve care coordination. Users with access to [employer] or [address] (Web-based application) may access additional patient information including the Discharge Summary. If you are not currently a [address] user and need more information, please call the number listed below in the [place] [address] section and ask to be connected with Medical Records. Facility Information Name Address Phone [address] [place] [id number] [phone] Patient Information Patient Name Sex NINO Prasad ([id number]) Female 1955 Discharge Information Admitting Provider Service Area Unit Olamide Miller MD /  [place] [address] Intensive Care / [phone] Discharge Provider Discharge Date/Time Discharge Disposition Destination (none) 2017 (Pending) [facility]AT[facility] (none) Patient Language Language ENGLISH [13] Hospital Problems as of 2017  Reviewed: 2017  5:19 PM by Olamide Miller MD  
  
  
  
 Class Noted - Resolved Last Modified POA Active Problems * (Principal)SOB (shortness of breath)  2017 - Present 2017 by Olamide Miller MD Unknown Entered by Olamide Miller MD  
  
Non-Hospital Problems as of 2017  Reviewed: 2017  5:19 PM by Olamide Miller MD  
 None You are allergic to the following No active allergies Current Discharge Medication List  
  
START taking these medications Dose & Instructions Dispensing Information Comments  
 albuterol-ipratropium 2.5 mg-0.5 mg/3 ml Nebu Commonly known as:  Mari Flores Dose:  3 mL  
3 mL by Nebulization route every four (4) hours as needed. Quantity:  30 Nebule Refills:  2  
   
 budesonide 0.5 mg/2 mL Nbsp Commonly known as:  PULMICORT  
 Dose:  500 mcg 2 mL by Nebulization route two (2) times a day. Quantity:  120 mL Refills:  0  
   
 carvedilol 6.25 mg tablet Commonly known as:  [place] Dose:  6.25 mg Take 1 Tab by mouth two (2) times daily (with meals). Quantity:  60 Tab Refills:  3  
   
 guaiFENesin  mg ER tablet Commonly known as:  Ayo [employer] Ayo Dose:  600 mg Take 1 Tab by mouth every twelve (12) hours. Quantity:  60 Tab Refills:  0  
   
 hydrALAZINE 25 mg tablet Commonly known as:  APRESOLINE Dose:  25 mg Take 1 Tab by mouth three (3) times daily. Quantity:  90 Tab Refills:  3  
   
 isosorbide dinitrate 10 mg tablet Commonly known as:  ISORDIL Dose:  10 mg Take 1 Tab by mouth three (3) times daily. Quantity:  90 Tab Refills:  3  
   
 potassium chloride SR 20 mEq tablet Commonly known as:  K-TAB Dose:  20 mEq Take 1 Tab by mouth two (2) times a day. Quantity:  60 Tab Refills:  1  
   
 predniSONE 10 mg tablet Commonly known as:  Eligio Fast Take 4 tabs daily X 3 days, then Take 3 tabs X 3 days, then 2 tabs X 3 days, then 1 tab X 3 days. Quantity:  30 Tab Refills:  0  
   
 spironolactone 25 mg tablet Commonly known as:  ALDACTONE Dose:  25 mg Take 1 Tab by mouth daily. Quantity:  30 Tab Refills:  3 CONTINUE these medications which have CHANGED Dose & Instructions Dispensing Information Comments  
 bumetanide 1 mg tablet Commonly known as:  Fabby Castellano What changed:   
- how much to take - when to take this Dose:  1 mg Take 1 Tab by mouth two (2) times a day. Quantity:  60 Tab Refills:  3 CONTINUE these medications which have NOT CHANGED Dose & Instructions Dispensing Information Comments  
 aspirin delayed-release 81 mg tablet Dose:  81 mg Take 81 mg by mouth daily. Refills:  0  
   
 fenofibrate nanocrystallized 48 mg tablet Commonly known as:  [employer] Dose:  48 mg Take 48 mg by mouth daily. Refills:  0  
   
 sertraline 100 mg tablet Commonly known as:  ZOLOFT Dose:  100 mg Take 1 Tab by mouth daily. Quantity:  15 Tab Refills:  0 STOP taking these medications Comments Fenofibrate 40 mg Tab  
   
   
 lisinopril 2.5 mg tablet Commonly known as:  PRINIVIL, ZESTRIL  
   
   
 metoprolol tartrate 25 mg tablet Commonly known as:  LOPRESSOR Follow-up Information Follow up With Details Comments Contact Info Daniella Ash MD On 12/14/2017 [address] [facility] 57 
[phone] Harris Davis MD Schedule an appointment as soon as possible for a visit within 3-5 days following discharge from [facility]. [place] [address] [address] [facility] 57 
[phone] Bipin Car MD In 1 week Discharge follow [facility]  [address] [facility] [address] 
[phone] Discharge Instructions Future Appointments Date Time Provider [facility] 12/14/2017 8:40 AM Daniella Ash  [address]  
12/14/2017 8:45 AM [facility] [address] [facility]ing provider: Carol Mo MD 
 
Primary care provider: Bipin Car MD 
 
 
[address] COURSE: 
 
59 yo female with PMhx of Chronic respiratory failure due to COPD on 3L O2 and CPAP HS/PRN, HTN, Chronic systolic CHF s/p AICD, admitted for ongoing SOB and wheezing for 3 days Acute on Chronic Respiratory failure with Hypercapnia due to COPD Exacerbation 
- needs to go home on Trilogy, seen by Pulmonary - Trilogy at night and PRN during daytime - c/w O2 3L during day time - c/w Nebs, continue bravana/pulmicort at discharge 
- steroids from IV to PO Prednisone taper - V/Q scan: very low prob for PE 
  
Acute Pulmonary Edema due to Acute on Chronic Systolic CHF NYHA IV on admission 
- on Bumex changed to IV to PO BID  
- c/w Coreg 
- hold ACEI/ARBs due to renal dysfunction.  
- I/O 
 - aldactone,  Imdur and Hydralazine added by cardio 
  
  
Acute Kidney Insufficiency on CKD 3/4 due to diuresis -  May have to accept high Cr to improve respiratory status 
 -  Stable Cr, likely has baseline CKD 3/4 
  
S/p AICD 
- Interrogation showed 73 episodes of VT terminated by ATP and MAT 
- EP considering Mexiletine if recurs - cardiology following 
- ECHO noted 
- EKG with LBBB, may need BIV ICD upgrade, can be done as outpt 
  
Mildly elevated Trop Due to CHF/Acute Respf ailure - d/c trend  
  
HTN 
- c/w Coreg and Hydralazine  
  
Morbidly Obese 
- diet modification and wt loss recommended FOLLOW-UP CARE RECOMMENDATIONS: 
 
APPOINTMENTS: 
Follow-up Information Follow up With Details Comments Contact Info Kamille Vargas MD On 12/14/2017 [address] [place] [address] [address] [facility] 57 
[phone] Gia Manning MD Schedule an appointment as soon as possible for a visit within 3-5 days following discharge from [facility]. [place] [address] [address] [facility] 57 
[phone] Shyla Levin MD In 1 week Discharge follow up  [address] [address] 
[phone] It is very important that you keep follow-up appointment(s). Bring discharge papers, medication list (and/or medication bottles) to follow-up appointments for review by outpatient provider(s). FOLLOW-UP TESTS RECOMMENDED:  
· USE TRILOGY AT NIGHT AND PRN DURING DAYTIME 
 
ONGOING TREATMENT PLAN: Rehab PENDING TEST RESULTS: 
At the time of discharge the following test results are still pending: none. Please review these results as they become available. Specific symptoms to watch for: chest pain, shortness of breath, fever, chills, nausea, vomiting, diarrhea, change in mentation, falling, weakness, bleeding. DIET:  Cardiac Diet ACTIVITY:  Activity as tolerated and PT/OT Eval and Treat WOUND CARE: NONE 
 
EQUIPMENT needed:  none INCIDENTAL FINDINGS:  none GOALS OF CARE: 
X  Eventual return to home/independent/assisted living Long term SNF Hospice No rehospitalization Patient condition at discharge:  
Functional status Poor X  Deconditioned Independent Cognition X  Lucid Forgetful (some sensescence) Dementia Catheters/lines (plus indication) Workman PICC   
  PEG Code status X  Full code DNR Paco Petersen . . . . . . . . . . . . . . . . . . . . . . . . . . . . . . . . . . . . . . . . . . . . . . . . . . . . . . . . . . . . . . . . . . . . . . Paco Petersen CHRONIC MEDICAL CONDITIONS: 
Problem List as of 9/20/2017  Date Reviewed: 9/14/2017 Codes Class Noted - Resolved * (Principal)SOB (shortness of breath) ICD-10-CM: R06.02 
ICD-9-CM: 786.05  9/14/2017 - Present Chart Review Routing History Recipient Method Report Sent By Jerona Fortis Senaida Peabody, MD  
Fax: [phone] Phone: [phone] Fax Radha GREENWOOD MD NOTES AUTO ROUTING REPORT Diamante Keller MD [[id number]] 9/20/2017  4:26 PM 09/20/2017

## 2017-09-14 NOTE — IP AVS SNAPSHOT
[address] 
[phone] Patient: Smith Mcnamara MRN: [id number] NBB:1[birth year]34[birth year]70 You are allergic to the following No active allergies Recent Documentation Height Weight BMI Smoking Status 1.702 m (!) 201.1 kg 69.44 kg/m2 Former Smoker Emergency Contacts Name Discharge Info Relation Home Work Mobile Roberta Sierra N/A  AT THIS TIME [6] Sister [23] [phone] About your hospitalization You were admitted on:  September 14, 2017 You last received care in the:  [facility] You were discharged on:  September 20, 2017 Unit phone number:  [phone] Why you were hospitalized Your primary diagnosis was:  Sob (Shortness Of Breath) Providers Seen During Your Hospitalizations Provider Role Specialty Primary office phone Alistair New MD Attending Provider Emergency Medicine [phone] Lizzie Frederick MD Attending Provider Hospitalist [phone] Nestor Girard MD Attending Provider Internal Medicine [phone] Your Primary Care Physician (PCP) Primary Care Physician Office Phone Office Fax Placido Sutton [address] Follow-up Information Follow up With Details Comments Contact Info Felicia Cortes MD On 12/14/2017 [address] [place] [address] [address] [place] 
[phone] Ofelia Bruno MD Schedule an appointment as soon as possible for a visit within 3-5 days following discharge from [facility]. [place] [address] [address] [place] 
[phone] Manuel Diez MD In 1 week Discharge follow up  [facility] [address] 
[phone] Current Discharge Medication List  
  
START taking these medications Dose & Instructions Dispensing Information Comments Morning Noon Evening Bedtime albuterol-ipratropium 2.5 mg-0.5 mg/3 ml Nebu Commonly known as:  Papa Lee Your last dose was: Your next dose is:    
   
   
 Dose:  3 mL  
3 mL by Nebulization route every four (4) hours as needed. Quantity:  30 Nebule Refills:  2  
     
   
   
   
  
 budesonide 0.5 mg/2 mL Nbsp Commonly known as:  PULMICORT Your last dose was: Your next dose is:    
   
   
 Dose:  500 mcg 2 mL by Nebulization route two (2) times a day. Quantity:  120 mL Refills:  0  
     
   
   
   
  
 carvedilol 6.25 mg tablet Commonly known as:  [employer] Your last dose was: Your next dose is:    
   
   
 Dose:  6.25 mg Take 1 Tab by mouth two (2) times daily (with meals). Quantity:  60 Tab Refills:  3  
     
   
   
   
  
 guaiFENesin  mg ER tablet Commonly known as:  [place] [address] Your last dose was: Your next dose is:    
   
   
 Dose:  600 mg Take 1 Tab by mouth every twelve (12) hours. Quantity:  60 Tab Refills:  0  
     
   
   
   
  
 hydrALAZINE 25 mg tablet Commonly known as:  APRESOLINE Your last dose was: Your next dose is:    
   
   
 Dose:  25 mg Take 1 Tab by mouth three (3) times daily. Quantity:  90 Tab Refills:  3  
     
   
   
   
  
 isosorbide dinitrate 10 mg tablet Commonly known as:  ISORDIL Your last dose was: Your next dose is:    
   
   
 Dose:  10 mg Take 1 Tab by mouth three (3) times daily. Quantity:  90 Tab Refills:  3  
     
   
   
   
  
 potassium chloride SR 20 mEq tablet Commonly known as:  K-TAB Your last dose was: Your next dose is:    
   
   
 Dose:  20 mEq Take 1 Tab by mouth two (2) times a day. Quantity:  60 Tab Refills:  1  
     
   
   
   
  
 predniSONE 10 mg tablet Commonly known as:  Jefm Canter Your last dose was: Your next dose is: Take 4 tabs daily X 3 days, then Take 3 tabs X 3 days, then 2 tabs X 3 days, then 1 tab X 3 days. Quantity:  30 Tab Refills:  0  
     
   
   
   
  
 spironolactone 25 mg tablet Commonly known as:  ALDACTONE Your last dose was: Your next dose is:    
   
   
 Dose:  25 mg Take 1 Tab by mouth daily. Quantity:  30 Tab Refills:  3 CONTINUE these medications which have CHANGED Dose & Instructions Dispensing Information Comments Morning Noon Evening Bedtime  
 bumetanide 1 mg tablet Commonly known as:  Cullen Cee What changed:   
- how much to take - when to take this Your last dose was: Your next dose is:    
   
   
 Dose:  1 mg Take 1 Tab by mouth two (2) times a day. Quantity:  60 Tab Refills:  3 CONTINUE these medications which have NOT CHANGED Dose & Instructions Dispensing Information Comments Morning Noon Evening Bedtime  
 aspirin delayed-release 81 mg tablet Your last dose was: Your next dose is:    
   
   
 Dose:  81 mg Take 81 mg by mouth daily. Refills:  0  
     
   
   
   
  
 fenofibrate nanocrystallized 48 mg tablet Commonly known as:  [employer] Your last dose was: Your next dose is:    
   
   
 Dose:  48 mg Take 48 mg by mouth daily. Refills:  0  
     
   
   
   
  
 sertraline 100 mg tablet Commonly known as:  ZOLOFT Your last dose was: Your next dose is:    
   
   
 Dose:  100 mg Take 1 Tab by mouth daily. Quantity:  15 Tab Refills:  0 STOP taking these medications Fenofibrate 40 mg Tab  
   
  
 lisinopril 2.5 mg tablet Commonly known as:  PRINIVIL, ZESTRIL  
   
  
 metoprolol tartrate 25 mg tablet Commonly known as:  LOPRESSOR Where to Get Your Medications Information on where to get these meds will be given to you by the nurse or doctor. ! Ask your nurse or doctor about these medications  
  albuterol-ipratropium 2.5 mg-0.5 mg/3 ml Nebu  
 budesonide 0.5 mg/2 mL Nbsp  
 bumetanide 1 mg tablet  
 carvedilol 6.25 mg tablet  
 guaiFENesin  mg ER tablet  
 hydrALAZINE 25 mg tablet  
 isosorbide dinitrate 10 mg tablet  
 potassium chloride SR 20 mEq tablet  
 predniSONE 10 mg tablet  
 sertraline 100 mg tablet  
 spironolactone 25 mg tablet Discharge Instructions Future Appointments Date Time Provider Lizzette Jeffers 12/14/2017 8:40 AM Aleena Lawson  [address]  
12/14/2017 8:45 AM [facility] [address] [facility] [address] [facility]ing provider: Quan Sher MD 
 
Primary care provider: Christiane Krause MD 
 
 
[address] [facility] COURSE: 
 
59 yo female with PMhx of Chronic respiratory failure due to COPD on 3L O2 and CPAP HS/PRN, HTN, Chronic systolic CHF s/p AICD, admitted for ongoing SOB and wheezing for 3 days Acute on Chronic Respiratory failure with Hypercapnia due to COPD Exacerbation 
- needs to go home on Trilogy, seen by Pulmonary - Trilogy at night and PRN during daytime - c/w O2 3L during day time - c/w Nebs, continue bravana/pulmicort at discharge 
- steroids from IV to PO Prednisone taper - V/Q scan: very low prob for PE 
  
Acute Pulmonary Edema due to Acute on Chronic Systolic CHF NYHA IV on admission 
- on Bumex changed to IV to PO BID  
- c/w Coreg 
- hold ACEI/ARBs due to renal dysfunction.  
- I/O 
- aldactone,  Imdur and Hydralazine added by cardio 
  
  
Acute Kidney Insufficiency on CKD 3/4 due to diuresis -  May have to accept high Cr to improve respiratory status 
 -  Stable Cr, likely has baseline CKD 3/4 
  
S/p AICD 
- Interrogation showed 73 episodes of VT terminated by ATP and MAT 
- EP considering Mexiletine if recurs - cardiology following 
- ECHO noted 
- EKG with LBBB, may need BIV ICD upgrade, can be done as outpt 
  
Mildly elevated Trop Due to CHF/Acute Respf ailure - d/c trend  
  
HTN 
- c/w Coreg and Hydralazine  
  
Morbidly Obese 
- diet modification and wt loss recommended FOLLOW-UP CARE RECOMMENDATIONS: 
 
APPOINTMENTS: 
Follow-up Information Follow up With Details Comments Contact Info Sarbjit Gutierrez MD On 12/14/2017 [address] [place] [address] [address] 
[phone] Homero Diop MD Schedule an appointment as soon as possible for a visit within 3-5 days following discharge from [facility]. [place] [address] [address] 
[phone] Xenia Alex MD In 1 week Discharge follow up  2 [address] [place] [id number] [phone] It is very important that you keep follow-up appointment(s). Bring discharge papers, medication list (and/or medication bottles) to follow-up appointments for review by outpatient provider(s). FOLLOW-UP TESTS RECOMMENDED:  
· USE TRILOGY AT NIGHT AND PRN DURING DAYTIME 
 
ONGOING TREATMENT PLAN: Rehab PENDING TEST RESULTS: 
At the time of discharge the following test results are still pending: none. Please review these results as they become available. Specific symptoms to watch for: chest pain, shortness of breath, fever, chills, nausea, vomiting, diarrhea, change in mentation, falling, weakness, bleeding. DIET:  Cardiac Diet ACTIVITY:  Activity as tolerated and PT/OT Eval and Treat WOUND CARE: NONE 
 
EQUIPMENT needed:  none INCIDENTAL FINDINGS:  none GOALS OF CARE: 
X  Eventual return to home/independent/assisted living Long term SNF Hospice No rehospitalization Patient condition at discharge:  
Functional status Poor X  Deconditioned Independent Cognition X  Lucid Forgetful (some sensescence) Dementia Catheters/lines (plus indication) Workman PICC   
  PEG Code status X  Full code   DNR   
 . . . . . . . . . . . . . . . . . . . . . . . . . . . . . . . . . . . . . . . . . . . . . . . . . . . . . . . . . . . . . . . . . . . . . . . Niki Harrison CHRONIC MEDICAL CONDITIONS: 
Problem List as of 9/20/2017  Date Reviewed: 9/14/2017 Codes Class Noted - Resolved * (Principal)SOB (shortness of breath) ICD-10-CM: R06.02 
ICD-9-CM: 786.05  9/14/2017 - Present Discharge Instructions Attachments/References HEART FAILURE: AVOIDING TRIGGERS (ENGLISH) Discharge Orders None Zmanda Announcement We are excited to announce that we are making your provider's discharge notes available to you in Zmanda. You will see these notes when they are completed and signed by the physician that discharged you from your recent hospital stay. If you have any questions or concerns about any information you see in Zmanda, please call the Health Information Department where you were seen or reach out to your Primary Care Provider for more information about your plan of care. Introducing [facility]! Rosalinda Florence introduces Zmanda patient portal. Now you can access parts of your medical record, email your doctor's office, and request medication refills online. 1. In your internet browser, go to [email or web address] 2. Click on the First Time User? Click Here link in the Sign In box. You will see the New Member Sign Up page. 3. Enter your Zmanda Access Code exactly as it appears below. You will not need to use this code after youve completed the sign-up process. If you do not sign up before the expiration date, you must request a new code. · Zmanda Access Code: [email or web address] Expires: 12/14/2017 12:03 PM 
 
4. Enter the last four digits of your Social Security Number (xxxx) and Date of Birth (mm/dd/yyyy) as indicated and click Submit. You will be taken to the next sign-up page. 5. Create a Zmanda ID.  This will be your Zmanda login ID and cannot be changed, so think of one that is secure and easy to remember. 6. Create a Bestowed password. You can change your password at any time. 7. Enter your Password Reset Question and Answer. This can be used at a later time if you forget your password. 8. Enter your e-mail address. You will receive e-mail notification when new information is available in [address] 9. Click Sign Up. You can now view and download portions of your medical record. 10. Click the Download Summary menu link to download a portable copy of your medical information. If you have questions, please visit the Frequently Asked Questions section of the Bestowed website. Remember, Bestowed is NOT to be used for urgent needs. For medical emergencies, dial 911. Now available from your iPhone and Android! General Information Please provide this summary of care documentation to your next provider. Patient Signature:  ____________________________________________________________ Date:  ____________________________________________________________  
  
Wilton Torres Provider Signature:  ____________________________________________________________ Date:  ____________________________________________________________ More Information Avoiding Triggers With Heart Failure: Care Instructions Your Care Instructions Triggers are anything that make your heart failure flare up. A flare-up is also called \"sudden heart failure\" or \"acute heart failure. \" When you have a flare-up, fluid builds up in your lungs, and you have problems breathing. You might need to go to the hospital. By watching for changes in your condition and avoiding triggers, you can prevent heart failure flare-ups. Follow-up care is a key part of your treatment and safety. Be sure to make and go to all appointments, and call your doctor if you are having problems.  It's also a good idea to know your test results and keep a list of the medicines you take. How can you care for yourself at home? Watch for changes in your weight and condition · Weigh yourself without clothing at the same time each day. Record your weight. Call your doctor if you have sudden weight gain, such as more than 2 to 3 pounds in a day or 5 pounds in a week. (Your doctor may suggest a different range of weight gain.) A sudden weight gain may mean that your heart failure is getting worse. · Keep a daily record of your symptoms. Write down any changes in how you feel, such as new shortness of breath, cough, or problems eating. Also record if your ankles are more swollen than usual and if you feel more tired than usual. Note anything that you ate or did that could have triggered these changes. Limit sodium Sodium causes your body to hold on to extra water. This may cause your heart failure symptoms to get worse. People get most of their sodium from processed foods. Fast food and restaurant meals also tend to be very high in sodium. · Your doctor may suggest that you limit sodium to 2,000 milligrams (mg) a day or less. That is less than 1 teaspoon of salt a day, including all the salt you eat in cooking or in packaged foods. · Read food labels on cans and food packages. They tell you how much sodium you get in one serving. Check the serving size. If you eat more than one serving, you are getting more sodium. · Be aware that sodium can come in forms other than salt, including monosodium glutamate (MSG), sodium citrate, and sodium bicarbonate (baking soda). MSG is often added to Asian food. You can sometimes ask for food without MSG or salt. · Slowly reducing salt will help you adjust to the taste. Take the salt shaker off the table. · Flavor your food with garlic, lemon juice, onion, vinegar, herbs, and spices instead of salt.  Do not use soy sauce, steak sauce, onion salt, garlic salt, mustard, or ketchup on your food, unless it is labeled \"low-sodium\" or \"low-salt. \" 
· Make your own salad dressings, sauces, and ketchup without adding salt. · Use fresh or frozen ingredients, instead of canned ones, whenever you can. Choose low-sodium canned goods. · Eat less processed food and food from restaurants, including fast food. Exercise as directed Moderate, regular exercise is very good for your heart. It improves your blood flow and helps control your weight. But too much exercise can stress your heart and cause a heart failure flare-up. · Check with your doctor before you start an exercise program. 
· Walking is an easy way to get exercise. Start out slowly. Gradually increase the length and pace of your walk. Swimming, riding a bike, and using a treadmill are also good forms of exercise. · When you exercise, watch for signs that your heart is working too hard. You are pushing yourself too hard if you cannot talk while you are exercising. If you become short of breath or dizzy or have chest pain, stop, sit down, and rest. 
· Do not exercise when you do not feel well. Take medicines correctly · Take your medicines exactly as prescribed. Call your doctor if you think you are having a problem with your medicine. · Make a list of all the medicines you take. Include those prescribed to you by other doctors and any over-the-counter medicines, vitamins, or supplements you take. Take this list with you when you go to any doctor. · Take your medicines at the same time every day. It may help you to post a list of all the medicines you take every day and what time of day you take them. · Make taking your medicine as simple as you can. Plan times to take your medicines when you are doing other things, such as eating a meal or getting ready for bed. This will make it easier to remember to take your medicines. · Get organized. Use helpful tools, such as daily or weekly pill containers. When should you call for help? Call 911 if you have symptoms of sudden heart failure such as: 
· You have severe trouble breathing. · You cough up pink, foamy mucus. · You have a new irregular or rapid heartbeat. Call your doctor now or seek immediate medical care if: 
· You have new or increased shortness of breath. · You are dizzy or lightheaded, or you feel like you may faint. · You have sudden weight gain, such as more than 2 to 3 pounds in a day or 5 pounds in a week. (Your doctor may suggest a different range of weight gain.) · You have increased swelling in your legs, ankles, or feet. · You are suddenly so tired or weak that you cannot do your usual activities. Watch closely for changes in your health, and be sure to contact your doctor if you develop new symptoms. Where can you learn more? Go to http://gena-ginny.info/. Enter Z802 in the search box to learn more about \"Avoiding Triggers With Heart Failure: Care Instructions. \" Current as of: February 23, 2017 Content Version: 11.3 © 5267-9703 [employer]. Care instructions adapted under license by [employer] (which disclaims liability or warranty for this information). If you have questions about a medical condition or this instruction, always ask your healthcare professional. [place] [address] any warranty or liability for your use of this information.

## 2017-09-15 NOTE — CDMP QUERY
Dr. Jamaal Lorenzo,     Please clarify if this patient is being treated/managed for:    =>COPD with exacerbation in the setting of wheezing and shortness of breath requiring nebs, IV steroids, and O2 support  =>Other Explanation of clinical findings  =>Unable to Determine (no explanation of clinical findings)    The medical record reflects the following clinical findings, treatment, and risk factors:    Risk Factors: 57 y/o pt  Clinical Indicators: wheezing, shortness of breath  Treatment: IV steroids, O2 support, O2 support    Please clarify and document your clinical opinion in the progress notes and discharge summary including the definitive and/or presumptive diagnosis, (suspected or probable), related to the above clinical findings. Please include clinical findings supporting your diagnosis.     Thank you,   Catalina Landrum, [address]

## 2017-09-15 NOTE — NURSE NAVIGATOR
Chart reviewed by Heart Failure Nurse Navigator. Heart Failure database completed. EF echo pending      ACEi/ARB:lisinopril 2.5 mg, daily . BB: on metoprolol tartrate (consider GDMT BB such as coreg, toprol XL, bisoprolol). CRT: implanted. NYHA Functional Class documentation requested via Provider Message on [employer]. Heart Failure Teach Back in Patient Education. Heart Failure Avoiding Triggers on Discharge Instructions.       Cardiologist:  Dr. Linda Trent ([facility])

## 2017-09-15 NOTE — ED NOTES
Pt taken to and from CT with RN, pt tolerated scan well and maintained oxygenation. Pt changed for episode of incontinence and linen changed. 9:52 PM  Pt back in bed in position of comfort being placed back on bipap. Pt denies any needs at this time.

## 2017-09-15 NOTE — PROGRESS NOTES
Hospitalist Progress Note  Tommy Espinosa MD  Office: [phone]  Cell: [phone]      Date of Service:  9/15/2017  NAME:  Christopher Chavarria  :  1955  MRN:  [id number]      Admission Summary:   57 yo female with PMhx of Chronic respiratory failure due to COPD  on 3L O2 and CPAP HS/PRN, HTN, Chronic systolic CHF s/p AICD, admitted for ongoing SOB and wheezing for 3 days    Interval history / Subjective:     Pt seen and examined    Overnight on BIPAP, breathing improved       Assessment & Plan:     Acute on Chronic Respiratory failure with Hypercapnia due to COPD Exacerbation  - on BIPAP at night  - needs to go home on BIPAP HS and PRN  - c/w O2 3L during day time  - c/w Nebs, steroids  - V/Q scan: very low prob for PE    Acute Pulmonary Edema due to Acute on Chronic Systolic CHF NYHA IV on admission   - on Bumex TID  - c/w Coreg  - hold ACEI/ARBs due to renal dysfunction.   - I/O    S/p AICD  - Interrogation showed 73 episodes of VT terminated by ATP  - cardiology following  - ECHO pending   - EKG with LBBB, may need BIV ICD upgrade,    Mildly elevated Trop  Due to CHF/Acute Respf ailure  - d/c trend     HTN  - c/w Coreg and Hydralazine     Morbidly Obese  - diet modification and wt loss recommended      Code status: FULL  DVT prophylaxis: SCDs    Care Plan discussed with: Patient/Family  Disposition: Home w/Family and HH PT, OT, RN     Hospital Problems  Date Reviewed: 2017          Codes Class Noted POA    * (Principal)SOB (shortness of breath) ICD-10-CM: R06.02  ICD-9-CM: 786.05  2017 Unknown                Review of Systems:   A comprehensive review of systems was negative except for that written in the HPI. Vital Signs:    Last 24hrs VS reviewed since prior progress note.  Most recent are:  Visit Vitals    /79    Pulse (!) 114    Temp 98.1 °F (36.7 °C)    Resp 27    Wt (!) 198.3 kg (437 lb 2.8 oz)    SpO2 95% Intake/Output Summary (Last 24 hours) at 09/15/17 1251  Last data filed at 09/15/17 0900   Gross per 24 hour   Intake              790 ml   Output              325 ml   Net              465 ml        Physical Examination:             Constitutional:  No acute distress, cooperative, pleasant    ENT:  Oral mucous moist,    Resp:  mild rales at base posteriorly   CV:  Regular rhythm, normal rate,     GI:  Soft, morbidly obese, NT    Musculoskeletal:  traceedema, warm, 2+ pulses throughout    Neurologic:  Moves all extremities. AAOx3,      Skin:  chronic venous stasis changes in LE b/l        Data Review:    Review and/or order of clinical lab test  Review and/or order of tests in the radiology section of CPT  Review and/or order of tests in the medicine section of CPT      Labs:     Recent Labs      09/15/17   0442  09/14/17   1409   WBC  5.7  7.7   HGB  13.1  14.1   HCT  41.5  45.1   PLT  172  194     Recent Labs      09/15/17   0442  09/14/17   1409   NA  143  139   K  3.8  3.4*   CL  97  94*   CO2  36*  37*   BUN  37*  38*   CREA  1.57*  1.72*   GLU  138*  110*   CA  9.4  9.3   MG   --   1.9     Recent Labs      09/15/17   0442  09/14/17   1409   SGOT  27  33   ALT  20  21   AP  27*  29*   TBILI  1.6*  1.7*   TP  7.2  8.1   ALB  3.6  3.8   GLOB  3.6  4.3*     No results for input(s): INR, PTP, APTT in the last 72 hours. No lab exists for component: INREXT   No results for input(s): FE, TIBC, PSAT, FERR in the last 72 hours. No results found for: FOL, RBCF   No results for input(s): PH, PCO2, PO2 in the last 72 hours.   Recent Labs      09/15/17   0009  09/14/17   2105  09/14/17   1409   TROIQ  0.07*  0.07*  0.06*     No results found for: CHOL, CHOLX, CHLST, CHOLV, HDL, LDL, LDLC, DLDLP, TGLX, TRIGL, TRIGP, CHHD, CHHDX  No results found for: GLUCPOC  No results found for: COLOR, APPRN, SPGRU, REFSG, KARISSA, PROTU, GLUCU, KETU, BILU, UROU, CONSUELO, LEUKU, GLUKE, EPSU, BACTU, WBCU, RBCU, CASTS, [facility]      Medications Reviewed:     Current Facility-Administered Medications   Medication Dose Route Frequency    fenofibrate nanocrystallized (TRICOR) tablet 48 mg  48 mg Oral DAILY    sertraline (ZOLOFT) tablet 100 mg  100 mg Oral DAILY    sodium chloride (NS) flush 5-10 mL  5-10 mL IntraVENous Q8H    sodium chloride (NS) flush 5-10 mL  5-10 mL IntraVENous PRN    acetaminophen (TYLENOL) tablet 650 mg  650 mg Oral Q4H PRN    heparin (porcine) injection 5,000 Units  5,000 Units SubCUTAneous Q8H    aspirin delayed-release tablet 81 mg  81 mg Oral DAILY    albuterol-ipratropium (DUO-NEB) 2.5 MG-0.5 MG/3 ML  3 mL Nebulization Q4H RT    methylPREDNISolone (PF) (SOLU-MEDROL) injection 60 mg  60 mg IntraVENous Q8H    hydrALAZINE (APRESOLINE) tablet 25 mg  25 mg Oral TID    carvedilol (COREG) tablet 6.25 mg  6.25 mg Oral BID WITH MEALS    bumetanide (BUMEX) injection 1 mg  1 mg IntraVENous TID    potassium chloride SR (KLOR-CON 10) tablet 20 mEq  20 mEq Oral BID     ______________________________________________________________________  EXPECTED LENGTH OF STAY: 4d 14h  ACTUAL LENGTH OF STAY:          1                 Gladys Grossman MD

## 2017-09-15 NOTE — PROGRESS NOTES
Cardiac Electrophysiology Progress Note      9/15/2017 10:39 AM    Admit Date: 9/14/2017    Admit Diagnosis: SOB (shortness of breath)  SOB (shortness of breath)      Subjective:     Aristides Sheikh feels better this morning. + BELL  No chest pain      Visit Vitals    /68    Pulse (!) 121    Temp 97.6 °F (36.4 °C)    Resp 22    Wt (!) 437 lb 2.8 oz (198.3 kg)    SpO2 96%     Current Facility-Administered Medications   Medication Dose Route Frequency    perflutren lipid microspheres (DEFINITY) in NS bolus IV  1.5 mL IntraVENous RAD ONCE    sodium chloride (NS) flush 20 mL  20 mL IntraVENous ONCE PRN    fenofibrate nanocrystallized (TRICOR) tablet 48 mg  48 mg Oral DAILY    sertraline (ZOLOFT) tablet 100 mg  100 mg Oral DAILY    sodium chloride (NS) flush 5-10 mL  5-10 mL IntraVENous Q8H    sodium chloride (NS) flush 5-10 mL  5-10 mL IntraVENous PRN    acetaminophen (TYLENOL) tablet 650 mg  650 mg Oral Q4H PRN    heparin (porcine) injection 5,000 Units  5,000 Units SubCUTAneous Q8H    aspirin delayed-release tablet 81 mg  81 mg Oral DAILY    albuterol-ipratropium (DUO-NEB) 2.5 MG-0.5 MG/3 ML  3 mL Nebulization Q4H RT    methylPREDNISolone (PF) (SOLU-MEDROL) injection 60 mg  60 mg IntraVENous Q8H    hydrALAZINE (APRESOLINE) tablet 25 mg  25 mg Oral TID    carvedilol (COREG) tablet 6.25 mg  6.25 mg Oral BID WITH MEALS    bumetanide (BUMEX) injection 1 mg  1 mg IntraVENous TID    potassium chloride SR (KLOR-CON 10) tablet 20 mEq  20 mEq Oral BID         Objective:      Physical Exam:  Visit Vitals    /68    Pulse (!) 121    Temp 97.6 °F (36.4 °C)    Resp 22    Wt (!) 437 lb 2.8 oz (198.3 kg)    SpO2 96%     General Appearance: Morbid obesity,alert and oriented x 3, and individual in no acute distress. Ears/Nose/Mouth/Throat:   Hearing grossly normal.         Neck: Supple. Chest:   Lungs clear to auscultation bilaterally- exam limited d/t body habitus.    Cardiovascular:  accelerated rate and rhythm no murmur. Abdomen: Morbidly obese   Extremities: +2 edema bilaterally with chronic venous dermatitis . Skin: Warm and dry. ICD scar left infraclavicular unremarkable. Data Review:   Labs:  Recent Results (from the past 24 hour(s))   EKG, 12 LEAD, INITIAL    Collection Time: 09/14/17  2:08 PM   Result Value Ref Range    Ventricular Rate 93 BPM    Atrial Rate 93 BPM    P-R Interval 198 ms    QRS Duration 164 ms    Q-T Interval 400 ms    QTC Calculation (Bezet) 497 ms    Calculated P Axis 54 degrees    Calculated R Axis -62 degrees    Calculated T Axis 101 degrees    Diagnosis       Normal sinus rhythm  Left axis deviation  Left bundle branch block  When compared with ECG of 29-NOV-2004 16:45,  Left bundle branch block is now present  Confirmed by Dmitri Macias MD, Therese Hess ([id number]) on 9/14/2017 4:22:31 PM     CBC WITH AUTOMATED DIFF    Collection Time: 09/14/17  2:09 PM   Result Value Ref Range    WBC 7.7 3.6 - 11.0 K/uL    RBC 4.61 3.80 - 5.20 M/uL    HGB 14.1 11.5 - 16.0 g/dL    HCT 45.1 35.0 - 47.0 %    MCV 97.8 80.0 - 99.0 FL    MCH 30.6 26.0 - 34.0 PG    MCHC 31.3 30.0 - 36.5 g/dL    RDW 18.0 (H) 11.5 - 14.5 %    PLATELET 380 969 - 158 K/uL    NEUTROPHILS 72 32 - 75 %    LYMPHOCYTES 16 12 - 49 %    MONOCYTES 11 5 - 13 %    EOSINOPHILS 1 0 - 7 %    BASOPHILS 0 0 - 1 %    ABS. NEUTROPHILS 5.6 1.8 - 8.0 K/UL    ABS. LYMPHOCYTES 1.2 0.8 - 3.5 K/UL    ABS. MONOCYTES 0.8 0.0 - 1.0 K/UL    ABS. EOSINOPHILS 0.1 0.0 - 0.4 K/UL    ABS.  BASOPHILS 0.0 0.0 - 0.1 K/UL   METABOLIC PANEL, COMPREHENSIVE    Collection Time: 09/14/17  2:09 PM   Result Value Ref Range    Sodium 139 136 - 145 mmol/L    Potassium 3.4 (L) 3.5 - 5.1 mmol/L    Chloride 94 (L) 97 - 108 mmol/L    CO2 37 (H) 21 - 32 mmol/L    Anion gap 8 5 - 15 mmol/L    Glucose 110 (H) 65 - 100 mg/dL    BUN 38 (H) 6 - 20 MG/DL    Creatinine 1.72 (H) 0.55 - 1.02 MG/DL    BUN/Creatinine ratio 22 (H) 12 - 20      GFR est AA 36 (L) >60 ml/min/1.73m2    GFR est non-AA 30 (L) >60 ml/min/1.73m2    Calcium 9.3 8.5 - 10.1 MG/DL    Bilirubin, total 1.7 (H) 0.2 - 1.0 MG/DL    ALT (SGPT) 21 12 - 78 U/L    AST (SGOT) 33 15 - 37 U/L    Alk.  phosphatase 29 (L) 45 - 117 U/L    Protein, total 8.1 6.4 - 8.2 g/dL    Albumin 3.8 3.5 - 5.0 g/dL    Globulin 4.3 (H) 2.0 - 4.0 g/dL    A-G Ratio 0.9 (L) 1.1 - 2.2     TROPONIN I    Collection Time: 09/14/17  2:09 PM   Result Value Ref Range    Troponin-I, Qt. 0.06 (H) <0.05 ng/mL   MAGNESIUM    Collection Time: 09/14/17  2:09 PM   Result Value Ref Range    Magnesium 1.9 1.6 - 2.4 mg/dL   EKG, 12 LEAD, INITIAL    Collection Time: 09/14/17  4:23 PM   Result Value Ref Range    Ventricular Rate 122 BPM    Atrial Rate 127 BPM    QRS Duration 148 ms    Q-T Interval 364 ms    QTC Calculation (Bezet) 518 ms    Calculated R Axis -136 degrees    Calculated T Axis 65 degrees    Diagnosis       atrial fibrillation with a rapid ventricular response  Left bundle branch block  Left axis deviation      Confirmed by Samina Lanier MD, Sadia Dash ([id number]) on 9/15/2017 10:05:53 AM     POC G3 - PUL    Collection Time: 09/14/17  4:43 PM   Result Value Ref Range    pH (POC) 7.379 7.35 - 7.45      pCO2 (POC) 69.2 (H) 35.0 - 45.0 MMHG    pO2 (POC) 64 (L) 80 - 100 MMHG    HCO3 (POC) 40.8 (H) 22 - 26 MMOL/L    sO2 (POC) 90 (L) 92 - 97 %    Base excess (POC) 16 mmol/L    Site RIGHT RADIAL      Device: NASAL CANNULA      Flow rate (POC) 5.0 L/M    Allens test (POC) YES      Specimen type (POC) ARTERIAL     NT-PRO BNP    Collection Time: 09/14/17  5:00 PM   Result Value Ref Range    NT pro-BNP 9210 (H) 0 - 125 PG/ML   TROPONIN I    Collection Time: 09/14/17  9:05 PM   Result Value Ref Range    Troponin-I, Qt. 0.07 (H) <0.05 ng/mL   TROPONIN I    Collection Time: 09/15/17 12:09 AM   Result Value Ref Range    Troponin-I, Qt. 0.07 (H) <0.53 ng/mL   METABOLIC PANEL, COMPREHENSIVE    Collection Time: 09/15/17  4:42 AM   Result Value Ref Range    Sodium 143 136 - 145 mmol/L    Potassium 3.8 3.5 - 5.1 mmol/L    Chloride 97 97 - 108 mmol/L    CO2 36 (H) 21 - 32 mmol/L    Anion gap 10 5 - 15 mmol/L    Glucose 138 (H) 65 - 100 mg/dL    BUN 37 (H) 6 - 20 MG/DL    Creatinine 1.57 (H) 0.55 - 1.02 MG/DL    BUN/Creatinine ratio 24 (H) 12 - 20      GFR est AA 40 (L) >60 ml/min/1.73m2    GFR est non-AA 33 (L) >60 ml/min/1.73m2    Calcium 9.4 8.5 - 10.1 MG/DL    Bilirubin, total 1.6 (H) 0.2 - 1.0 MG/DL    ALT (SGPT) 20 12 - 78 U/L    AST (SGOT) 27 15 - 37 U/L    Alk. phosphatase 27 (L) 45 - 117 U/L    Protein, total 7.2 6.4 - 8.2 g/dL    Albumin 3.6 3.5 - 5.0 g/dL    Globulin 3.6 2.0 - 4.0 g/dL    A-G Ratio 1.0 (L) 1.1 - 2.2     CBC WITH AUTOMATED DIFF    Collection Time: 09/15/17  4:42 AM   Result Value Ref Range    WBC 5.7 3.6 - 11.0 K/uL    RBC 4.26 3.80 - 5.20 M/uL    HGB 13.1 11.5 - 16.0 g/dL    HCT 41.5 35.0 - 47.0 %    MCV 97.4 80.0 - 99.0 FL    MCH 30.8 26.0 - 34.0 PG    MCHC 31.6 30.0 - 36.5 g/dL    RDW 17.9 (H) 11.5 - 14.5 %    PLATELET 862 444 - 155 K/uL    NEUTROPHILS 91 (H) 32 - 75 %    LYMPHOCYTES 4 (L) 12 - 49 %    MONOCYTES 5 5 - 13 %    EOSINOPHILS 0 0 - 7 %    BASOPHILS 0 0 - 1 %    ABS. NEUTROPHILS 5.2 1.8 - 8.0 K/UL    ABS. LYMPHOCYTES 0.2 (L) 0.8 - 3.5 K/UL    ABS. MONOCYTES 0.3 0.0 - 1.0 K/UL    ABS. EOSINOPHILS 0.0 0.0 - 0.4 K/UL    ABS. BASOPHILS 0.0 0.0 - 0.1 K/UL    DF MANUAL      RBC COMMENTS ANISOCYTOSIS  1+               Assessment:     Principal Problem:    SOB (shortness of breath) (9/14/2017)        Assessment/ Plan:  1. Acute on chronic CHF: likely dilated CM. Patient denies cardiac cath, in the process of obtaining records from [facility]. -She had not been on GDMT, now on coreg and hydralazine. No ACE/ARb d/t CKD/SANTOS  - echo pending  -ICD in situ  bumex 1 mg TID, UOP difficult to measures, patient is incontinent at times. Hypertension BP controlled with hydralazine     NSVT: reviewed ICD interrogation this morning.  She has had a total of 73 episodes of VT, treated with ATP. No ICD discharges. -currently on coreg will continue to monitor, may need Mexiletine for VT suppression or amiodarone depending on pulmonary function.      Acute renal failure-no baseline lab to compare. Creatinine trending down 1.52 today. Lisinopril on hold. K 3.8     AICD dual chamber Medtronic- implanted at [facility] in 11/2016 but she refused to go back to [facility] to get it checked due to bad experience with implant  -ICD interrogation 73 episodes of VT treated with ATP. No ICD shocks      LBBB: may need BIV ICD upgrade, echo pending     Acute on chronic respiratory failure : Pulmonary following, bipap at night    -steroids and duo nebs    Right axis deviation-new: VQ scan pending to r/o PE      Troponin is mildly elevated 0.06 and this could be from CHF, not acute NSTEMI     Morbid Obesity- will be difficult to manage and would not be candidate for LVAD  Will need to lose weight via diet and need dietary consult       Rupa Bowels, NP     Addendum from EP attending:   I have seen, examined patient, and discussed with nurse practitioner, registered nurse, reviewed, updated note and agree with the assessment and plan    I have talked to her this am. She is feeling better, able to lie flat to sleep  Was on CPAP  Vital signs are stable  Exam shows regular rhythm and no rub  Crackles on exam  Legs edema   Morbidly obsee  Assessment and Plan:  I have checked her Medtronic ICD  She has had 73 VT and some with rate over 220 bpm but terminated with ATP  She has these due to CHF exacerbation  Dr Jennifer Dunn will help with CHF management from here and I told her I will help with VT and ICD follow up  She agrees with the plan  Jennifer Pena M.D.  [facility]  Electrophysiology/Cardiology  [facility] [place]  [address], [place] [address], [address]  [address]  [phone]

## 2017-09-15 NOTE — CONSULTS
[facility]  Pulmonary, Critical Care, and Sleep Medicine    Initial Patient Consult    Name: Daryl Montes De Oca MRN: [id number]   : 1955 Hospital: [facility]    Date: 9/15/2017        IMPRESSION:   · Acute on chronic resp failure- underlying chronic hypercarbia and on home O2. Due to baseline chronic CHF but likely also component of of OHS/OSAS and ? RV failure( h/o recurrent ascites). · Morbid obesity  · HTN  · SANTOS  · AICD  · CMP EF 30-40%      RECOMMENDATIONS:   · Diuresis  · xfer to [facility]  · Will need outpt PSG   · Repeat ECHO eval PASP and RV fxn. Consider RHC if PASP elevated out of proportion to degree LV dysfxn. · Sched bipap at night- should go home on bipap  back up rate 8. · Diet counseling     Subjective: This patient has been seen and evaluated at the request of Dr. Eli Forde for SOB. Patient is a 58 y.o. female with h/o COPD andf chronic home O2 who presented to ED with SOB and chest tightness and wheezing. Pt noted to have NSVT in ED. She has an AICD. PT was placed on Bipap. This am is alert less SOB. Denies CP or abd pain. Past Medical History:   Diagnosis Date    Chronic obstructive pulmonary disease ([address]     Heart failure (HCC)     Hypertension       Past Surgical History:   Procedure Laterality Date    HX PACEMAKER        Prior to Admission medications    Medication Sig Start Date End Date Taking? Authorizing Provider   lisinopril (PRINIVIL, ZESTRIL) 2.5 mg tablet Take 2.5 mg by mouth daily. Yes Abelino Nye MD   metoprolol tartrate (LOPRESSOR) 25 mg tablet Take 25 mg by mouth two (2) times a day. Yes Abelino Nye MD   bumetanide (BUMEX) 1 mg tablet Take 3 mg by mouth daily. Yes Abelino Nye MD   sertraline (ZOLOFT) 100 mg tablet Take 100 mg by mouth daily. Yes Abelino Nye MD   aspirin delayed-release 81 mg tablet Take 81 mg by mouth daily. Yes Abelino Nye MD   fenofibrate nanocrystallized (TRICOR) 48 mg tablet Take 48 mg by mouth daily. Yes Historical Provider     No Known Allergies   Social History   Substance Use Topics    Smoking status: Former Smoker    Smokeless tobacco: Never Used    Alcohol use No      History reviewed. No pertinent family history. Current Facility-Administered Medications   Medication Dose Route Frequency    perflutren lipid microspheres (DEFINITY) in NS bolus IV  1.5 mL IntraVENous RAD ONCE    fenofibrate nanocrystallized (TRICOR) tablet 48 mg  48 mg Oral DAILY    sertraline (ZOLOFT) tablet 100 mg  100 mg Oral DAILY    sodium chloride (NS) flush 5-10 mL  5-10 mL IntraVENous Q8H    heparin (porcine) injection 5,000 Units  5,000 Units SubCUTAneous Q8H    aspirin delayed-release tablet 81 mg  81 mg Oral DAILY    albuterol-ipratropium (DUO-NEB) 2.5 MG-0.5 MG/3 ML  3 mL Nebulization Q4H RT    methylPREDNISolone (PF) (SOLU-MEDROL) injection 60 mg  60 mg IntraVENous Q8H    hydrALAZINE (APRESOLINE) tablet 25 mg  25 mg Oral TID    carvedilol (COREG) tablet 6.25 mg  6.25 mg Oral BID WITH MEALS    bumetanide (BUMEX) injection 1 mg  1 mg IntraVENous TID    potassium chloride SR (KLOR-CON 10) tablet 20 mEq  20 mEq Oral BID       Review of Systems:  A comprehensive review of systems was negative except for that written in the HPI. Objective:   Vital Signs:    Visit Vitals    /68    Pulse (!) 121    Temp 97.6 °F (36.4 °C)    Resp 22    Wt (!) 198.3 kg (437 lb 2.8 oz)    SpO2 96%       O2 Device: BIPAP   O2 Flow Rate (L/min): 5 l/min   Temp (24hrs), Av.6 °F (36.4 °C), Min:97 °F (36.1 °C), Max:98.1 °F (36.7 °C)       Intake/Output:   Last shift:         Last 3 shifts: 1 - 09/15 0700  In: 149 [P.O.:650;  I.V.:20]  Out: 125 [Urine:125]    Intake/Output Summary (Last 24 hours) at 09/15/17 0925  Last data filed at 09/15/17 0700   Gross per 24 hour   Intake              670 ml   Output              125 ml   Net              545 ml      Physical Exam:   General:  Alert, cooperative, no distress, appears stated age. Head:  Normocephalic, without obvious abnormality, atraumatic. Eyes:  Conjunctivae/corneas clear. PERRL, EOMs intact. Nose: Nares normal. Septum midline. Mucosa normal. No drainage or sinus tenderness. Throat: Lips, mucosa, and tongue normal. Teeth and gums normal.   Neck: Supple, symmetrical, trachea midline,. Lungs:   Clear to auscultation bilaterally. Heart:  Regular rate and rhythm, S1, S2 normal, no murmur, click, rub or gallop. Abdomen:   Soft, non-tender. Bowel sounds normal. No masses,  No organomegaly. Extremities: Extremities normal, atraumatic, no cyanosis or edema. Pulses: 2+ and symmetric all extremities. Skin: Skin color, texture, turgor normal. No rashes or lesions             Data review:     Recent Results (from the past 24 hour(s))   EKG, 12 LEAD, INITIAL    Collection Time: 09/14/17  2:08 PM   Result Value Ref Range    Ventricular Rate 93 BPM    Atrial Rate 93 BPM    P-R Interval 198 ms    QRS Duration 164 ms    Q-T Interval 400 ms    QTC Calculation (Bezet) 497 ms    Calculated P Axis 54 degrees    Calculated R Axis -62 degrees    Calculated T Axis 101 degrees    Diagnosis       Normal sinus rhythm  Left axis deviation  Left bundle branch block  When compared with ECG of 29-NOV-2004 16:45,  Left bundle branch block is now present  Confirmed by Jaclyn Nichols MD, Dolores Range ([id number]) on 9/14/2017 4:22:31 PM     CBC WITH AUTOMATED DIFF    Collection Time: 09/14/17  2:09 PM   Result Value Ref Range    WBC 7.7 3.6 - 11.0 K/uL    RBC 4.61 3.80 - 5.20 M/uL    HGB 14.1 11.5 - 16.0 g/dL    HCT 45.1 35.0 - 47.0 %    MCV 97.8 80.0 - 99.0 FL    MCH 30.6 26.0 - 34.0 PG    MCHC 31.3 30.0 - 36.5 g/dL    RDW 18.0 (H) 11.5 - 14.5 %    PLATELET 054 285 - 708 K/uL    NEUTROPHILS 72 32 - 75 %    LYMPHOCYTES 16 12 - 49 %    MONOCYTES 11 5 - 13 %    EOSINOPHILS 1 0 - 7 %    BASOPHILS 0 0 - 1 %    ABS. NEUTROPHILS 5.6 1.8 - 8.0 K/UL    ABS. LYMPHOCYTES 1.2 0.8 - 3.5 K/UL    ABS. MONOCYTES 0.8 0.0 - 1.0 K/UL    ABS. EOSINOPHILS 0.1 0.0 - 0.4 K/UL    ABS. BASOPHILS 0.0 0.0 - 0.1 K/UL   METABOLIC PANEL, COMPREHENSIVE    Collection Time: 09/14/17  2:09 PM   Result Value Ref Range    Sodium 139 136 - 145 mmol/L    Potassium 3.4 (L) 3.5 - 5.1 mmol/L    Chloride 94 (L) 97 - 108 mmol/L    CO2 37 (H) 21 - 32 mmol/L    Anion gap 8 5 - 15 mmol/L    Glucose 110 (H) 65 - 100 mg/dL    BUN 38 (H) 6 - 20 MG/DL    Creatinine 1.72 (H) 0.55 - 1.02 MG/DL    BUN/Creatinine ratio 22 (H) 12 - 20      GFR est AA 36 (L) >60 ml/min/1.73m2    GFR est non-AA 30 (L) >60 ml/min/1.73m2    Calcium 9.3 8.5 - 10.1 MG/DL    Bilirubin, total 1.7 (H) 0.2 - 1.0 MG/DL    ALT (SGPT) 21 12 - 78 U/L    AST (SGOT) 33 15 - 37 U/L    Alk.  phosphatase 29 (L) 45 - 117 U/L    Protein, total 8.1 6.4 - 8.2 g/dL    Albumin 3.8 3.5 - 5.0 g/dL    Globulin 4.3 (H) 2.0 - 4.0 g/dL    A-G Ratio 0.9 (L) 1.1 - 2.2     TROPONIN I    Collection Time: 09/14/17  2:09 PM   Result Value Ref Range    Troponin-I, Qt. 0.06 (H) <0.05 ng/mL   MAGNESIUM    Collection Time: 09/14/17  2:09 PM   Result Value Ref Range    Magnesium 1.9 1.6 - 2.4 mg/dL   EKG, 12 LEAD, INITIAL    Collection Time: 09/14/17  4:23 PM   Result Value Ref Range    Ventricular Rate 122 BPM    Atrial Rate 127 BPM    QRS Duration 148 ms    Q-T Interval 364 ms    QTC Calculation (Bezet) 518 ms    Calculated R Axis -136 degrees    Calculated T Axis 65 degrees    Diagnosis       Wide QRS tachycardia  Nonspecific intraventricular block  When compared with ECG of 14-SEP-2017 14:08,  Wide QRS tachycardia has replaced Sinus rhythm     POC G3 - PUL    Collection Time: 09/14/17  4:43 PM   Result Value Ref Range    pH (POC) 7.379 7.35 - 7.45      pCO2 (POC) 69.2 (H) 35.0 - 45.0 MMHG    pO2 (POC) 64 (L) 80 - 100 MMHG    HCO3 (POC) 40.8 (H) 22 - 26 MMOL/L    sO2 (POC) 90 (L) 92 - 97 %    Base excess (POC) 16 mmol/L    Site RIGHT RADIAL      Device: NASAL CANNULA      Flow rate (POC) 5.0 L/M    Rashid test (POC) YES      Specimen type (POC) ARTERIAL     NT-PRO BNP    Collection Time: 09/14/17  5:00 PM   Result Value Ref Range    NT pro-BNP 9210 (H) 0 - 125 PG/ML   TROPONIN I    Collection Time: 09/14/17  9:05 PM   Result Value Ref Range    Troponin-I, Qt. 0.07 (H) <0.05 ng/mL   TROPONIN I    Collection Time: 09/15/17 12:09 AM   Result Value Ref Range    Troponin-I, Qt. 0.07 (H) <7.74 ng/mL   METABOLIC PANEL, COMPREHENSIVE    Collection Time: 09/15/17  4:42 AM   Result Value Ref Range    Sodium 143 136 - 145 mmol/L    Potassium 3.8 3.5 - 5.1 mmol/L    Chloride 97 97 - 108 mmol/L    CO2 36 (H) 21 - 32 mmol/L    Anion gap 10 5 - 15 mmol/L    Glucose 138 (H) 65 - 100 mg/dL    BUN 37 (H) 6 - 20 MG/DL    Creatinine 1.57 (H) 0.55 - 1.02 MG/DL    BUN/Creatinine ratio 24 (H) 12 - 20      GFR est AA 40 (L) >60 ml/min/1.73m2    GFR est non-AA 33 (L) >60 ml/min/1.73m2    Calcium 9.4 8.5 - 10.1 MG/DL    Bilirubin, total 1.6 (H) 0.2 - 1.0 MG/DL    ALT (SGPT) 20 12 - 78 U/L    AST (SGOT) 27 15 - 37 U/L    Alk. phosphatase 27 (L) 45 - 117 U/L    Protein, total 7.2 6.4 - 8.2 g/dL    Albumin 3.6 3.5 - 5.0 g/dL    Globulin 3.6 2.0 - 4.0 g/dL    A-G Ratio 1.0 (L) 1.1 - 2.2     CBC WITH AUTOMATED DIFF    Collection Time: 09/15/17  4:42 AM   Result Value Ref Range    WBC 5.7 3.6 - 11.0 K/uL    RBC 4.26 3.80 - 5.20 M/uL    HGB 13.1 11.5 - 16.0 g/dL    HCT 41.5 35.0 - 47.0 %    MCV 97.4 80.0 - 99.0 FL    MCH 30.8 26.0 - 34.0 PG    MCHC 31.6 30.0 - 36.5 g/dL    RDW 17.9 (H) 11.5 - 14.5 %    PLATELET 510 686 - 898 K/uL    NEUTROPHILS 91 (H) 32 - 75 %    LYMPHOCYTES 4 (L) 12 - 49 %    MONOCYTES 5 5 - 13 %    EOSINOPHILS 0 0 - 7 %    BASOPHILS 0 0 - 1 %    ABS. NEUTROPHILS 5.2 1.8 - 8.0 K/UL    ABS. LYMPHOCYTES 0.2 (L) 0.8 - 3.5 K/UL    ABS. MONOCYTES 0.3 0.0 - 1.0 K/UL    ABS. EOSINOPHILS 0.0 0.0 - 0.4 K/UL    ABS.  BASOPHILS 0.0 0.0 - 0.1 K/UL    DF MANUAL      RBC COMMENTS ANISOCYTOSIS  1+           Imaging:  I have personally reviewed the patients radiographs and have reviewed the reports:  PCXR with CM and edema.         David Higgins MD

## 2017-09-15 NOTE — ROUTINE PROCESS
0148: Patient brought to unit by ER in stretcher, still awaiting bariatric bed from [facility]. Experiencing SOB and heart failure exacerbation with 4-5 runs of vtach. Patient bathed with CHG upon admission and Bi-PAP continued. 0245: Bed finally arrived, patient transferred successfully. 0485: Patient ambulated to bedside commode with 2 assist, able to stand momentarily and pivot between commode and bed. Patient resting. Shift summary: Patient presenting with SOB and HF exacerbation, on Bi-PAP. We were told in report that the patient was incontinent, but were able to successfully get up to the bedside commode with 2 assists. Patient on strict I/Os despite lacking a catheter with a 24 hour fluid maximum of 1000 mL that restarted at midnight. Total recorded PO and IV intake is 370 mL, output 125 mL since admission to the ICU at [address] [facility]. Patient experienced 4-5 runs of vtach in ED requiring critical care observation.  Nuclear medicine set to transport patient for lung vent/perf procedure at 9am.

## 2017-09-15 NOTE — ED NOTES
Pt eating meal tray and tolerating well. Pt on 5L NC while eating and is oxygenating well. Pt denies any needs at this time and is aware of fluid restriction.

## 2017-09-15 NOTE — ROUTINE PROCESS
TRANSFER - OUT REPORT:    Verbal report given to Robyn (name) on Christopher Chavarria  being transferred to icu-9(unit) for routine progression of care       Report consisted of patients Situation, Background, Assessment and   Recommendations(SBAR). Information from the following report(s) SBAR, ED Summary, Intake/Output, MAR and Recent Results was reviewed with the receiving nurse. Lines:   Peripheral IV 09/14/17 Right Antecubital (Active)   Site Assessment Clean, dry, & intact 9/14/2017  2:12 PM   Phlebitis Assessment 0 9/14/2017  2:12 PM   Infiltration Assessment 0 9/14/2017  2:12 PM   Dressing Status Clean, dry, & intact 9/14/2017  2:12 PM   Hub Color/Line Status Pink 9/14/2017  2:12 PM        Opportunity for questions and clarification was provided. Patient transported with:   Monitor  O2 @ 5L NC liters  Registered Nurse        Awaiting room to be cleaned before transferring pt upstairs. Oncoming nurse aware of pt and plan of care.

## 2017-09-15 NOTE — ROUTINE PROCESS
2357:  TRANSFER - IN REPORT:    Verbal report received from Lisa FIORE(name) on Juuj Tovar  being received from ED(unit) for routine progression of care      Report consisted of patients Situation, Background, Assessment and   Recommendations(SBAR). Information from the following report(s) SBAR, Kardex, Procedure Summary, Intake/Output, MAR, Accordion, Recent Results, Med Rec Status and Cardiac Rhythm sinus rhythm w/LBBB was reviewed with the receiving nurse. Opportunity for questions and clarification was provided. Assessment completed upon patients arrival to unit and care assumed. 0100:  ED called asking about patient's room. Advised no bed in the room and will check with [facility] about ETA.     0111: Attempted to call ED to let them know [facility] would be bringing a bed.

## 2017-09-15 NOTE — PROGRESS NOTES
Problem: Falls - Risk of  Goal: *Absence of Falls  Document Nas Fall Risk and appropriate interventions in the flowsheet.    Outcome: Progressing Towards Goal  Fall Risk Interventions:  Mobility Interventions: Assess mobility with egress test, Bed/chair exit alarm, Communicate number of staff needed for ambulation/transfer, Patient to call before getting OOB           Medication Interventions: Assess postural VS orthostatic hypotension, Evaluate medications/consider consulting pharmacy, Bed/chair exit alarm, Patient to call before getting OOB     Elimination Interventions: Bed/chair exit alarm, Call light in reach, Elevated toilet seat, Patient to call for help with toileting needs, Toileting schedule/hourly rounds     History of Falls Interventions: Bed/chair exit alarm

## 2017-09-15 NOTE — PROGRESS NOTES
Bedside and Verbal shift change report given to [facility], Student Nurse & David Siegel RN (oncoming nurse) by Surjit Fagan RN (offgoing nurse). Report included the following information SBAR, Kardex, ED Summary, Intake/Output, Recent Results and Cardiac Rhythm Normal Sinus Rhythm. 2230: Patient agreed to the use of a PureWick for voiding. Will continue to monitor. 0730: Bedside and Verbal shift change report given to Carol Montanez (oncoming nurse) by [facility], student nurse & David Siegel (offgoing nurse). Report included the following information SBAR, Kardex, ED Summary, Intake/Output, Recent Results and Cardiac Rhythm NSR. Shift summary: Patient had uneventful night. Patient did switch from CPAP to BiPAP throughout the night. She is now on nasal cannula 5L/min.

## 2017-09-15 NOTE — PROGRESS NOTES
[facility]  -Progress Note     [phone]   9/15/2017      Victoria Noriega M.D. , F.A.C.C.   --------PCP:Zenia Nye MD   -----Subjective:   . Carlos Lyudmila Carlos Cruz is a 58 y.o. female   Seen by Dr Ubaldo Schirmer today   Has CHF and will need Genl Cards too  No specific new complaints  EF now 25% on today echo  Admit with SOB   Poor compliance, poor social and transport situations  Does not currently smoke  No immediate family with CAD  Patient Vitals for the past 12 hrs:   Temp Pulse Resp BP SpO2   09/15/17 1900 - 67 20 127/82 91 %   09/15/17 1800 - 76 28 (!) 129/115 91 %   09/15/17 1700 - 74 19 115/87 90 %   09/15/17 1600 98.2 °F (36.8 °C) 75 27 139/78 90 %   09/15/17 1520 - - - - 90 %   09/15/17 1500 - 88 27 - 94 %   09/15/17 1400 - 73 26 122/68 91 %   09/15/17 1300 - 84 22 128/73 91 %   09/15/17 1200 98.1 °F (36.7 °C) (!) 114 19 122/63 95 %   09/15/17 1144 - - - - 95 %   09/15/17 1100 - (!) 114 27 112/79 91 %   09/15/17 0900 - (!) 120 21 123/82 91 %   09/15/17 0800 97.6 °F (36.4 °C) (!) 121 22 112/68 96 %   09/15/17 0720 - - - - 92 %        Discussion/Plans/Recs    Acute on chronic systolic CHF  CXR with Significant cardiomegaly with pulmonary vascular congestion and mild edema  CHF EF 25%-had AICD last fall at [facility] 11/2016  Admit 2-3 times she says for\"breathing\"  Lives in [facility] and wants care here now  Has no transport,  and her getting divorce-upsetting  Denies a cath before   Not taking usual diuretics at full dose she admits  Chronic venous stasis skin changes  Was on beta blocker and ACE at home but not sure if taking and not sure if really taking bumex  Has LBBB    PAF  Looks like an irregular tachycardia at 3 am and 11 am , since 11 am clearly with NSR and clearly seen P waves, I suspect AFib more likely than MAT, will review with Dr Ubaldo Schirmer Monday and discuss further anti-coagulation  Multiple episodes of VT-mexilitene per EP choice if recurs  Moderate CKD    Continue ASA, Bumex, Coreg, hydralazine-consider change back to ACE as Cr < 2 once diuresis is equilibrated  Group to follow closely for EP and CHF         Cardiac Studies/Hx:  No specialty comments available. Past Medical History:   Diagnosis Date    Chronic obstructive pulmonary disease ([facility]     Heart failure ([facility]     Hypertension       ROS-pertinents  negative except as above  The pertinent portions of the medical history,physician and nursing notes, meds,vitals , labs and Ins/Outs,are reviewed in the electronic record.     Results for orders placed or performed during the hospital encounter of 09/14/17   EKG, 12 LEAD, INITIAL   Result Value Ref Range    Ventricular Rate 122 BPM    Atrial Rate 127 BPM    QRS Duration 148 ms    Q-T Interval 364 ms    QTC Calculation (Bezet) 518 ms    Calculated R Axis -136 degrees    Calculated T Axis 65 degrees    Diagnosis       atrial fibrillation with a rapid ventricular response  Left bundle branch block  Left axis deviation      Confirmed by Jaye Sandoval MD, Vanessa Alejandre ([id number]) on 9/15/2017 10:05:53 AM        Vitals:    09/15/17 1600 09/15/17 1700 09/15/17 1800 09/15/17 1900   BP: 139/78 115/87 (!) 129/115 127/82   BP 1 Location:       BP Patient Position:       Pulse: 75 74 76 67   Resp: 27 19 28 20   Temp: 98.2 °F (36.8 °C)      SpO2: 90% 90% 91% 91%   Weight:       Height:           Objective:    Physical Exam:   Patient Vitals for the past 12 hrs:   Temp Pulse Resp BP SpO2   09/15/17 1900 - 67 20 127/82 91 %   09/15/17 1800 - 76 28 (!) 129/115 91 %   09/15/17 1700 - 74 19 115/87 90 %   09/15/17 1600 98.2 °F (36.8 °C) 75 27 139/78 90 %   09/15/17 1520 - - - - 90 %   09/15/17 1500 - 88 27 - 94 %   09/15/17 1400 - 73 26 122/68 91 %   09/15/17 1300 - 84 22 128/73 91 %   09/15/17 1200 98.1 °F (36.7 °C) (!) 114 19 122/63 95 %   09/15/17 1144 - - - - 95 %   09/15/17 1100 - (!) 114 27 112/79 91 %   09/15/17 0900 - (!) 120 21 123/82 91 %   09/15/17 0800 97.6 °F (36.4 °C) (!) 121 22 112/68 96 %   09/15/17 0720 - - - - 92 %      General:  alert, cooperative, mild distress, appears stated age, morbidly obese   ENT, Neck:  no jvd   Chest Wall: barrel chest, tachypnea   Lung: clear to auscultation bilaterally   Heart:  normal rate, regular rhythm, normal S1, S2, no murmurs, rubs, clicks or gallops   Abdomen: nondistended   Extremities: venous stasis dermatitis noted     Last 24hr Input/Output:    Intake/Output Summary (Last 24 hours) at 09/15/17 1919  Last data filed at 09/15/17 1800   Gross per 24 hour   Intake              910 ml   Output              525 ml   Net              385 ml        Data Review:   Recent Results (from the past 24 hour(s))   TROPONIN I    Collection Time: 09/14/17  9:05 PM   Result Value Ref Range    Troponin-I, Qt. 0.07 (H) <0.05 ng/mL   TROPONIN I    Collection Time: 09/15/17 12:09 AM   Result Value Ref Range    Troponin-I, Qt. 0.07 (H) <8.05 ng/mL   METABOLIC PANEL, COMPREHENSIVE    Collection Time: 09/15/17  4:42 AM   Result Value Ref Range    Sodium 143 136 - 145 mmol/L    Potassium 3.8 3.5 - 5.1 mmol/L    Chloride 97 97 - 108 mmol/L    CO2 36 (H) 21 - 32 mmol/L    Anion gap 10 5 - 15 mmol/L    Glucose 138 (H) 65 - 100 mg/dL    BUN 37 (H) 6 - 20 MG/DL    Creatinine 1.57 (H) 0.55 - 1.02 MG/DL    BUN/Creatinine ratio 24 (H) 12 - 20      GFR est AA 40 (L) >60 ml/min/1.73m2    GFR est non-AA 33 (L) >60 ml/min/1.73m2    Calcium 9.4 8.5 - 10.1 MG/DL    Bilirubin, total 1.6 (H) 0.2 - 1.0 MG/DL    ALT (SGPT) 20 12 - 78 U/L    AST (SGOT) 27 15 - 37 U/L    Alk.  phosphatase 27 (L) 45 - 117 U/L    Protein, total 7.2 6.4 - 8.2 g/dL    Albumin 3.6 3.5 - 5.0 g/dL    Globulin 3.6 2.0 - 4.0 g/dL    A-G Ratio 1.0 (L) 1.1 - 2.2     CBC WITH AUTOMATED DIFF    Collection Time: 09/15/17  4:42 AM   Result Value Ref Range    WBC 5.7 3.6 - 11.0 K/uL    RBC 4.26 3.80 - 5.20 M/uL    HGB 13.1 11.5 - 16.0 g/dL    HCT 41.5 35.0 - 47.0 %    MCV 97.4 80.0 - 99.0 FL    MCH 30.8 26.0 - 34.0 PG    MCHC 31.6 30.0 - 36.5 g/dL    RDW 17.9 (H) 11.5 - 14.5 %    PLATELET 711 866 - 310 K/uL    NEUTROPHILS 91 (H) 32 - 75 %    LYMPHOCYTES 4 (L) 12 - 49 %    MONOCYTES 5 5 - 13 %    EOSINOPHILS 0 0 - 7 %    BASOPHILS 0 0 - 1 %    ABS. NEUTROPHILS 5.2 1.8 - 8.0 K/UL    ABS. LYMPHOCYTES 0.2 (L) 0.8 - 3.5 K/UL    ABS. MONOCYTES 0.3 0.0 - 1.0 K/UL    ABS. EOSINOPHILS 0.0 0.0 - 0.4 K/UL    ABS.  BASOPHILS 0.0 0.0 - 0.1 K/UL    DF MANUAL      RBC COMMENTS ANISOCYTOSIS  1+          Jeff Gould MD 9/15/2017

## 2017-09-16 NOTE — PROGRESS NOTES
Shift Summary: Patient AO. 4. NSR; 6L O2 NC. Up to bedside commode several times today. Wound care consulted for pannus and mana area. Patient has transfer orders waiting for a bed.

## 2017-09-16 NOTE — PROGRESS NOTES
Addendum  To clarify Dr Chandra Ports me about possible PAF tonight  He wrote:  Her rhythm interesting and I had thought she had atrial tach but with device leads in place EGM showed that she had VT and atrial tach.  The VT was more apparent after she had RV burst pacing and it terminated her VT     Thus we cant take PAF out of issue for now, and watch for MAT  Dr Jeff Chu to  in am

## 2017-09-16 NOTE — PROGRESS NOTES
Isabela Cristina MD   [facility] [address]    Office [phone]  Mobile [phone]                          Cardiology progress note    IMPRESSION:   1: CHF, systolic, ?improved  2: Cardiomyopathy with LVEF 25%  3: ICD, ? arrhythmia, currently NSR  4: Obesity, COPD, CRF       RECOMMENDATIONS / PLANS   Appears euvolemic, comfortable. Okay with me go to telem, increase activity. Switch to po diuretic tomorrow.  Deemed not suitable for ACEI/ARB given CRF       Subjective:     No chest complaints        Objective:    Physical Exam:    Visit Vitals    /71    Pulse 75    Temp 97.6 °F (36.4 °C)    Resp 21    Ht 5' 7\" (1.702 m)    Wt (!) 436 lb 1.1 oz (197.8 kg)    SpO2 93%    BMI 68.3 kg/m2        Appearance: massive obesity    Cardiovascular: no murmur    Lungs: fairly clear    Extremities: no edema      Laboratory and Imaging have been personally reviewed and are notable for:    EKG: Telem NSR    X Ray: ?clear, technically limited    Labs:    Recent Labs      09/16/17   0516   NA  141   K  4.2   CL  96*   BUN  43*   CREA  1.53*   GLU  133*   CA  9.3     Recent Labs      09/16/17   0516   WBC  5.5   HGB  13.0   HCT  42.1   PLT  173

## 2017-09-16 NOTE — ROUTINE PROCESS
Student was appropriately supervised during medication administration by preceptor.     Loretta Watts

## 2017-09-16 NOTE — PROGRESS NOTES
1600. Pt resting in bed watching TV; no complaints; VSS; awaiting bed on CVSU    1730. Pt ate 100% of dinner; 200 cc tea consumed (total for today now up to 780 cc)    1830. Case management at the bedside speaking with patient    1930.  Report given to oncoming RN

## 2017-09-16 NOTE — PROGRESS NOTES
Care Management-Per CC review, order for home bi-pap noted. Per chart review, this is patient's first admission to a [facility] facility. Patient has home oxygen. Spoke with patient's nurse. Patient is alert and oriented and can speak with this CM. Spoke with Barrie to ask about eligibility for the bi-pap. Based on patient's diagnosis, she would qualify for a trilogy instead of a bi-pap. This CM met with patient in the room. Patient lives with her , but they are technically  and live in different sections of the house. Patient does not have a caregiver. 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 It is doing well, but they do not have any extra money. Her oldest son works there. They own the house he lives in but he pays for the home via payroll deduction. Their youngest son  a year ago. He has 2 sons age 25 and 13 who still live in that home. Her and her  are currently paying the mortgage on that home. Patient has home oxygen, but does not remember the name of the agency. She thinks it starts with an \"H\". She normally goes to [facility] for her care. Patient has a walker, power wheelchair, CPAP, and a ramp. The ramp was built by [employer] in [place] when she called them to ask for help with a ramp. She said their is still a bump down from her door, so she still cannot get out the door to use the ramp. She has asked the above family members to help her do that, but they have not. Her PCP is Dr. Raul Pierre on [facility]. She thinks [facility]. Patient has had home health in the past, but does not know the name. She was at [facility] in 2016. She owes them $4000. She has also been to a SNF in [facility] about 2 to 3 years ago. Patient said she had been able to get around her home using the chair and the walker.  She is unable to bath herself and said she does not have anyone who can help her. She cannot get into the shower on her side of the home because of her weight (436 lbs) and she cannot get to the bathroom where the walk in shower is  on her husbands side of the house. Patient said until 2 months ago, she was cleaning her own home, own laundry, own bathroom    Discussed going to a SNF after discharge. Patient did not think she would require a SNF. Discussed hiring someone to assist her at least one day a week to help her with bathing. Patient said they are unable to afford that. She said their mortgage is $1000 and the one they pay for their son is $900 per month. Patient will not be eligible for Medicaid as they own their home and their oldest son's home and they own a business. Discussed bi-pap/trilogy with patient. The patient said she uses the c-pap at night. Explained MD recommended a bi-pap. Spoke with patient's nurse regarding whether patient will need therapy consults. Next steps:  1-CM will need to determine the name of the DME company where patient receives her oxygen. 2-CM will need to speak with MD regarding bi-pap verses trilogy. 3-CM will follow to see how patient does with therapy to see if her needs can be met at home. Care Management Interventions  PCP Verified by CM:  Yes (Dr. Arie Lantigua)  Last Visit to PCP: 09/01/16  Discharge Durable Medical Equipment: No  Health Maintenance Reviewed: Yes  Physical Therapy Consult: No  Occupational Therapy Consult: No  Speech Therapy Consult: No  Current Support Network: Lives with Spouse (Lives with spouse but they are seperated and live seperately. )  Plan discussed with Pt/Family/Caregiver: Yes      JANIE Perkins

## 2017-09-16 NOTE — ROUTINE PROCESS
1530. Bedside shift change report given to KASEY Hannah (oncoming nurse) by Charbel Delvalle RN (offgoing nurse). Report included the following information SBAR, Kardex, ED Summary, Procedure Summary, Intake/Output, MAR, Accordion, Recent Results, Med Rec Status, Cardiac Rhythm NSR and Alarm Parameters . 1930. Bedside, Verbal and Written shift change report given to Lilia Walters RN (oncoming nurse) by Britni Keenan RN (offgoing nurse). Report included the following information SBAR, Kardex, ED Summary, Intake/Output, MAR, Accordion, Recent Results, Med Rec Status, Cardiac Rhythm NSR and Alarm Parameters .

## 2017-09-16 NOTE — PROGRESS NOTES
ADULT PROTOCOL: JET AEROSOL  REASSESSMENT    Patient  Samreen Knight     58 y.o.   female     9/16/2017  12:40 PM    Breath Sounds Pre Procedure: Right Breath Sounds: Diminished                               Left Breath Sounds: Diminished    Breath Sounds Post Procedure:  diminished                            Breathing pattern: Pre procedure Breathing Pattern: Regular          Post procedure Breathing Pattern: Regular    Heart Rate: Pre procedure Pulse: 70           Post procedure Pulse: 120    Resp Rate: Pre procedure Respirations: 24           Post procedure Respirations: 21    Cough: Pre procedure Cough: Non-productive      Oxygen: O2 Device: Nasal cannula        Changed: NO    SpO2: Pre procedure SpO2: 94 %   with oxygen              Post procedure SpO2: 90 %  with oxygen    Nebulizer Therapy: Current medications Aerosolized Medications: DuoNeb      Changed: YES    Smoking History: quit since age 28  Problem List:   Patient Active Problem List   Diagnosis Code    SOB (shortness of breath) R06.02       Respiratory Therapist: Yaneth Epperson RT

## 2017-09-16 NOTE — PROGRESS NOTES
Hospitalist Progress Note  Summer Vasquez MD  Office: [phone]  Cell: [phone]      Date of Service:  2017  NAME:  Shelby Farrar  :  1955  MRN:  [id number]      Admission Summary:   57 yo female with PMhx of Chronic respiratory failure due to COPD on 3L O2 and CPAP HS/PRN, HTN, Chronic systolic CHF s/p AICD, admitted for ongoing SOB and wheezing for 3 days    Interval history / Subjective:     Pt seen and examined    Overnight on BIPAP     Breathing much better    No chest pain       Assessment & Plan:     Acute on Chronic Respiratory failure with Hypercapnia due to COPD Exacerbation  - on BIPAP at night  - needs to go home on BIPAP HS and PRN, consult placed  - c/w O2 3L during day time  - c/w Nebs, steroids  - V/Q scan: very low prob for PE    Acute Pulmonary Edema due to Acute on Chronic Systolic CHF NYHA IV on admission   - on Bumex TID  - c/w Coreg  - hold ACEI/ARBs due to renal dysfunction.   - I/O    Paroxysmal Afib vs MAT?  - monitor on tele  - cardiology following  - NSR at this time  - c/w Coreg    S/p AICD  - Interrogation showed 73 episodes of VT terminated by ATP  - EP considering Mexiletine if recurs   - cardiology following  - ECHO pending   - EKG with LBBB, may need BIV ICD upgrade,    Mildly elevated Trop  Due to CHF/Acute Respf ailure  - d/c trend     HTN  - c/w Coreg and Hydralazine     Morbidly Obese  - diet modification and wt loss recommended      Code status: FULL  DVT prophylaxis: SCDs    Care Plan discussed with: Patient/Family  Disposition: Home w/Family and HH PT, OT, RN     Transfer to [facility] Problems  Date Reviewed: 2017          Codes Class Noted POA    * (Principal)SOB (shortness of breath) ICD-10-CM: R06.02  ICD-9-CM: 786.05  2017 Unknown                Review of Systems:   A comprehensive review of systems was negative except for that written in the HPI.        Vital Signs: Last 24hrs VS reviewed since prior progress note. Most recent are:  Visit Vitals    BP (!) 157/99    Pulse 88    Temp 98 °F (36.7 °C)    Resp 20    Ht 5' 7\" (1.702 m)    Wt (!) 197.8 kg (436 lb 1.1 oz)    SpO2 91%    BMI 68.3 kg/m2         Intake/Output Summary (Last 24 hours) at 09/16/17 1125  Last data filed at 09/16/17 0800   Gross per 24 hour   Intake              490 ml   Output              500 ml   Net              -10 ml        Physical Examination:             Constitutional:  No acute distress, cooperative, pleasant    ENT:  Oral mucous moist,    Resp:  clear air entry    CV:  Regular rhythm, normal rate,     GI:  Soft, morbidly obese, NT    Musculoskeletal:  traceedema, warm, 2+ pulses throughout    Neurologic:  Moves all extremities. AAOx3,      Skin:  chronic venous stasis changes in LE b/l        Data Review:    Review and/or order of clinical lab test  Review and/or order of tests in the radiology section of CPT  Review and/or order of tests in the medicine section of CPT      Labs:     Recent Labs      09/16/17   0516  09/15/17   0442   WBC  5.5  5.7   HGB  13.0  13.1   HCT  42.1  41.5   PLT  173  172     Recent Labs      09/16/17   0516  09/15/17   0442  09/14/17   1409   NA  141  143  139   K  4.2  3.8  3.4*   CL  96*  97  94*   CO2  36*  36*  37*   BUN  43*  37*  38*   CREA  1.53*  1.57*  1.72*   GLU  133*  138*  110*   CA  9.3  9.4  9.3   MG   --    --   1.9     Recent Labs      09/15/17   0442  09/14/17   1409   SGOT  27  33   ALT  20  21   AP  27*  29*   TBILI  1.6*  1.7*   TP  7.2  8.1   ALB  3.6  3.8   GLOB  3.6  4.3*     No results for input(s): INR, PTP, APTT in the last 72 hours. No lab exists for component: INREXT, INREXT   No results for input(s): FE, TIBC, PSAT, FERR in the last 72 hours. No results found for: FOL, RBCF   No results for input(s): PH, PCO2, PO2 in the last 72 hours.   Recent Labs      09/15/17   0009  09/14/17   2105  09/14/17   1409   TROIQ  0.07*  0.07* 0.06*     No results found for: CHOL, CHOLX, CHLST, CHOLV, HDL, LDL, LDLC, DLDLP, TGLX, TRIGL, TRIGP, CHHD, CHHDX  Lab Results   Component Value Date/Time    Glucose (POC) 124 09/15/2017 09:56 PM     No results found for: COLOR, APPRN, SPGRU, REFSG, KARISSA, PROTU, GLUCU, KETU, BILU, UROU, CONSUELO, LEUKU, GLUKE, EPSU, BACTU, WBCU, RBCU, CASTS, UCRY      Medications Reviewed:     Current Facility-Administered Medications   Medication Dose Route Frequency    fenofibrate nanocrystallized (TRICOR) tablet 48 mg  48 mg Oral DAILY    sertraline (ZOLOFT) tablet 100 mg  100 mg Oral DAILY    sodium chloride (NS) flush 5-10 mL  5-10 mL IntraVENous Q8H    sodium chloride (NS) flush 5-10 mL  5-10 mL IntraVENous PRN    acetaminophen (TYLENOL) tablet 650 mg  650 mg Oral Q4H PRN    heparin (porcine) injection 5,000 Units  5,000 Units SubCUTAneous Q8H    aspirin delayed-release tablet 81 mg  81 mg Oral DAILY    albuterol-ipratropium (DUO-NEB) 2.5 MG-0.5 MG/3 ML  3 mL Nebulization Q4H RT    methylPREDNISolone (PF) (SOLU-MEDROL) injection 60 mg  60 mg IntraVENous Q8H    hydrALAZINE (APRESOLINE) tablet 25 mg  25 mg Oral TID    carvedilol (COREG) tablet 6.25 mg  6.25 mg Oral BID WITH MEALS    bumetanide (BUMEX) injection 1 mg  1 mg IntraVENous TID    potassium chloride SR (KLOR-CON 10) tablet 20 mEq  20 mEq Oral BID     ______________________________________________________________________  EXPECTED LENGTH OF STAY: 4d 14h  ACTUAL LENGTH OF STAY:          2                 Nayla Wood MD

## 2017-09-17 NOTE — PROGRESS NOTES
Problem: Falls - Risk of  Goal: *Absence of Falls  Document Nas Fall Risk and appropriate interventions in the flowsheet.    Outcome: Progressing Towards Goal  Fall Risk Interventions:  Mobility Interventions: Communicate number of staff needed for ambulation/transfer           Medication Interventions: Evaluate medications/consider consulting pharmacy     Elimination Interventions: Call light in reach, Patient to call for help with toileting needs, Toileting schedule/hourly rounds     History of Falls Interventions: Evaluate medications/consider consulting pharmacy

## 2017-09-17 NOTE — PROGRESS NOTES
1530: Bedside and Verbal shift change report given to Joslyn Allred (oncoming nurse) by Rajat Norman (offgoing nurse). Report included the following information SBAR, Kardex, ED Summary, Intake/Output, MAR and Recent Results.

## 2017-09-17 NOTE — PROGRESS NOTES
Deena Sanchez MD   [facility] [address]    Office [phone]  Mobile [phone]                          Cardiology progress note    IMPRESSION:   1: CHF, systolic, ?improved  2: Cardiomyopathy with LVEF 25%  3: ICD, ? arrhythmia, currently NSR  4: Obesity, COPD.  5: ARF on CRF, presumably diuretics/volume contributory       RECOMMENDATIONS / PLANS   Euvolemic now, agree switch to oral diuretics. Telem, increase activity.        Subjective:     No chest complaints, breathing almost back to normal        Objective:    Physical Exam:    Visit Vitals    /72    Pulse 65    Temp 97.9 °F (36.6 °C)    Resp 17    Ht 5' 7\" (1.702 m)    Wt (!) 436 lb 8.2 oz (198 kg)    SpO2 92%    BMI 68.37 kg/m2      Cardiovascular: no murmur    Lungs: clear    Extremities: induration, no clearcut pitting      Laboratory and Imaging have been personally reviewed and are notable for:    EKG: Telem NSR    Labs:    Recent Labs      09/17/17   0451   NA  141   K  4.5   CL  97   BUN  52*   CREA  1.88*   GLU  138*   CA  9.2     Recent Labs      09/16/17   0516   WBC  5.5   HGB  13.0   HCT  42.1   PLT  173

## 2017-09-17 NOTE — PROGRESS NOTES
Hospitalist Progress Note  Yanique Carranza MD  Office: [phone]  Cell: [phone]      Date of Service:  2017  NAME:  Anthony Mendez  :  1955  MRN:  [id number]      Admission Summary:   59 yo female with PMhx of Chronic respiratory failure due to COPD on 3L O2 and CPAP HS/PRN, HTN, Chronic systolic CHF s/p AICD, admitted for ongoing SOB and wheezing for 3 days    Interval history / Subjective:     Pt seen and examined    Overnight on BIPAP     Sleeping comfortably    SOB resolved, breathing at baseline        Assessment & Plan:     Acute on Chronic Respiratory failure with Hypercapnia due to COPD Exacerbation  - on BIPAP at night  - needs to go home on BIPAP HS and PRN, consult placed  - c/w O2 3L during day time  - c/w Nebs, steroids  - V/Q scan: very low prob for PE    Acute Pulmonary Edema due to Acute on Chronic Systolic CHF NYHA IV on admission   - on Bumex changed to PO BID due to rise in Cr  - c/w Coreg  - hold ACEI/ARBs due to renal dysfunction.   - I/O    Paroxysmal Afib vs MAT?  - monitor on tele  - cardiology following  - NSR at this time  - c/w Coreg    CKD 3/4   -  Stable     S/p AICD  - Interrogation showed 73 episodes of VT terminated by ATP  - EP considering Mexiletine if recurs   - cardiology following  - ECHO pending   - EKG with LBBB, may need BIV ICD upgrade,    Mildly elevated Trop  Due to CHF/Acute Respf ailure  - d/c trend     HTN  - c/w Coreg and Hydralazine     Morbidly Obese  - diet modification and wt loss recommended      Code status: FULL  DVT prophylaxis: SCDs    Care Plan discussed with: Patient/Family  Disposition: Home w/Family and HH PT, OT, RN     Transfer to [place] [facility]  Date Reviewed: 2017          Codes Class Noted POA    * (Principal)SOB (shortness of breath) ICD-10-CM: R06.02  ICD-9-CM: 786.05  2017 Unknown                Review of Systems:   A comprehensive review of systems was negative except for that written in the HPI. Vital Signs:    Last 24hrs VS reviewed since prior progress note. Most recent are:  Visit Vitals    /72    Pulse 65    Temp 97.9 °F (36.6 °C)    Resp 17    Ht 5' 7\" (1.702 m)    Wt (!) 198 kg (436 lb 8.2 oz)    SpO2 93%    BMI 68.37 kg/m2         Intake/Output Summary (Last 24 hours) at 09/17/17 1111  Last data filed at 09/17/17 3628   Gross per 24 hour   Intake              260 ml   Output              350 ml   Net              -90 ml        Physical Examination:             Constitutional:  No acute distress, cooperative, pleasant    ENT:  Oral mucous moist,    Resp:  clear air entry    CV:  Regular rhythm, normal rate,     GI:  Soft, morbidly obese, NT    Musculoskeletal:  traceedema, warm, 2+ pulses throughout    Neurologic:  Moves all extremities. AAOx3,      Skin:  chronic venous stasis changes in LE b/l        Data Review:    Review and/or order of clinical lab test  Review and/or order of tests in the radiology section of CPT  Review and/or order of tests in the medicine section of CPT      Labs:     Recent Labs      09/16/17   0516  09/15/17   0442   WBC  5.5  5.7   HGB  13.0  13.1   HCT  42.1  41.5   PLT  173  172     Recent Labs      09/17/17   0451  09/16/17   0516  09/15/17   0442  09/14/17   1409   NA  141  141  143  139   K  4.5  4.2  3.8  3.4*   CL  97  96*  97  94*   CO2  37*  36*  36*  37*   BUN  52*  43*  37*  38*   CREA  1.88*  1.53*  1.57*  1.72*   GLU  138*  133*  138*  110*   CA  9.2  9.3  9.4  9.3   MG  2.4   --    --   1.9     Recent Labs      09/15/17   0442  09/14/17   1409   SGOT  27  33   ALT  20  21   AP  27*  29*   TBILI  1.6*  1.7*   TP  7.2  8.1   ALB  3.6  3.8   GLOB  3.6  4.3*     No results for input(s): INR, PTP, APTT in the last 72 hours. No lab exists for component: INREXT, INREXT   No results for input(s): FE, TIBC, PSAT, FERR in the last 72 hours.    No results found for: FOL, RBCF   No results for input(s): PH, PCO2, PO2 in the last 72 hours.   Recent Labs      09/15/17   0009  09/14/17   2105  09/14/17   1409   TROIQ  0.07*  0.07*  0.06*     No results found for: CHOL, CHOLX, CHLST, CHOLV, HDL, LDL, LDLC, DLDLP, TGLX, TRIGL, TRIGP, CHHD, CHHDX  Lab Results   Component Value Date/Time    Glucose (POC) 124 09/15/2017 09:56 PM     No results found for: COLOR, APPRN, SPGRU, REFSG, KARISSA, PROTU, GLUCU, KETU, BILU, UROU, CONSUELO, LEUKU, GLUKE, EPSU, BACTU, WBCU, RBCU, CASTS, UCRY      Medications Reviewed:     Current Facility-Administered Medications   Medication Dose Route Frequency    bumetanide (BUMEX) tablet 1 mg  1 mg Oral BID    methylPREDNISolone (PF) (SOLU-MEDROL) injection 40 mg  40 mg IntraVENous Q8H    albuterol-ipratropium (DUO-NEB) 2.5 MG-0.5 MG/3 ML  3 mL Nebulization Q4H PRN    fenofibrate nanocrystallized (TRICOR) tablet 48 mg  48 mg Oral DAILY    sertraline (ZOLOFT) tablet 100 mg  100 mg Oral DAILY    sodium chloride (NS) flush 5-10 mL  5-10 mL IntraVENous Q8H    sodium chloride (NS) flush 5-10 mL  5-10 mL IntraVENous PRN    acetaminophen (TYLENOL) tablet 650 mg  650 mg Oral Q4H PRN    heparin (porcine) injection 5,000 Units  5,000 Units SubCUTAneous Q8H    aspirin delayed-release tablet 81 mg  81 mg Oral DAILY    hydrALAZINE (APRESOLINE) tablet 25 mg  25 mg Oral TID    carvedilol (COREG) tablet 6.25 mg  6.25 mg Oral BID WITH MEALS    potassium chloride SR (KLOR-CON 10) tablet 20 mEq  20 mEq Oral BID     ______________________________________________________________________  EXPECTED LENGTH OF STAY: 4d 14h  ACTUAL LENGTH OF STAY:          3                 Anahi Horne MD

## 2017-09-17 NOTE — PROGRESS NOTES
1930: Bedside and Verbal shift change report given to Bibiana Hernandez RN (oncoming nurse) by Janelle Ortiz (offgoing nurse). Report included the following information SBAR, Kardex, ED Summary, Procedure Summary, Intake/Output, MAR, Accordion and Recent Results. 2000: Patient resting in bed watching tv. On 6L NC, VSS. Waiting on bed on CVSU.    0730: Bedside and Verbal shift change report given to Vaishnavi Mccarty RN (oncoming nurse) by Bibiana Hernandez RN (offgoing nurse). Report included the following information SBAR, Kardex, ED Summary, Procedure Summary, Intake/Output, MAR, Accordion and Recent Results.

## 2017-09-18 NOTE — PROGRESS NOTES
Hospitalist Progress Note  Laura Farah MD  Office: [phone]  Cell: [phone]      Date of Service:  2017  NAME:  Erwin Monroe  :  1955  MRN:  [id number]      Admission Summary:   57 yo female with PMhx of Chronic respiratory failure due to COPD on 3L O2 and CPAP HS/PRN, HTN, Chronic systolic CHF s/p AICD, admitted for ongoing SOB and wheezing for 3 days    Interval history / Subjective:     Pt seen and examined    Sitting up doing well   breathing back to baseline        Assessment & Plan:     Acute on Chronic Respiratory failure with Hypercapnia due to COPD Exacerbation  - on BIPAP at night  - needs to go home on Trilogy, seen by Pulmonary and instructions are on note from   - c/w O2 3L during day time  - c/w Nebs, continue bravana/pulmicort at discharge  - change to PO steroids in AM   - V/Q scan: very low prob for PE    Acute Pulmonary Edema due to Acute on Chronic Systolic CHF NYHA IV on admission   - on Bumex changed to PO BID due to rise in Cr  - c/w Coreg  - hold ACEI/ARBs due to renal dysfunction.   - I/O  - aldactone and Imdur added by cardio    Paroxysmal Afib vs MAT?  - monitor on tele  - cardiology following  - NSR at this time  - c/w Coreg    CKD 3/4   -  Stable     S/p AICD  - Interrogation showed 73 episodes of VT terminated by ATP  - EP considering Mexiletine if recurs   - cardiology following  - ECHO pending   - EKG with LBBB, may need BIV ICD upgrade,    Mildly elevated Trop  Due to CHF/Acute Respf ailure  - d/c trend     HTN  - c/w Coreg and Hydralazine     Morbidly Obese  - diet modification and wt loss recommended      Code status: FULL  DVT prophylaxis: SCDs    Care Plan discussed with: Patient/Family  Disposition: Home w/Family and HH PT, OT, RN     Transfer to [facility] [address] Problems  Date Reviewed: 2017          Codes Class Noted POA    * (Principal)SOB (shortness of breath) ICD-10-CM: R06.02  ICD-9-CM: 786.05  9/14/2017 Unknown                Review of Systems:   A comprehensive review of systems was negative except for that written in the HPI. Vital Signs:    Last 24hrs VS reviewed since prior progress note. Most recent are:  Visit Vitals    /83    Pulse 60    Temp 97.7 °F (36.5 °C)    Resp 23    Ht 5' 7\" (1.702 m)    Wt (!) 199.5 kg (439 lb 13.1 oz)    SpO2 92%    BMI 68.89 kg/m2         Intake/Output Summary (Last 24 hours) at 09/18/17 1054  Last data filed at 09/18/17 0800   Gross per 24 hour   Intake              582 ml   Output              275 ml   Net              307 ml        Physical Examination:             Constitutional:  No acute distress, cooperative, pleasant    ENT:  Oral mucous moist,    Resp:  clear air entry, occasional wheezing    CV:  Regular rhythm, normal rate,     GI:  Soft, morbidly obese, NT    Musculoskeletal:  traceedema, warm, 2+ pulses throughout    Neurologic:  Moves all extremities. AAOx3,      Skin:  chronic venous stasis changes in LE b/l        Data Review:    Review and/or order of clinical lab test  Review and/or order of tests in the radiology section of CPT  Review and/or order of tests in the medicine section of CPT      Labs:     Recent Labs      09/16/17   0516   WBC  5.5   HGB  13.0   HCT  42.1   PLT  173     Recent Labs      09/18/17   0524  09/17/17   0451  09/16/17   0516   NA  141  141  141   K  4.5  4.5  4.2   CL  99  97  96*   CO2  34*  37*  36*   BUN  58*  52*  43*   CREA  1.87*  1.88*  1.53*   GLU  117*  138*  133*   CA  8.8  9.2  9.3   MG   --   2.4   --      No results for input(s): SGOT, GPT, ALT, AP, TBIL, TBILI, TP, ALB, GLOB, GGT, AML, LPSE in the last 72 hours. No lab exists for component: AMYP, HLPSE  No results for input(s): INR, PTP, APTT in the last 72 hours. No lab exists for component: INREXT, INREXT   No results for input(s): FE, TIBC, PSAT, FERR in the last 72 hours.    No results found for: FOL, RBCF No results for input(s): PH, PCO2, PO2 in the last 72 hours. No results for input(s): CPK, CKNDX, TROIQ in the last 72 hours.     No lab exists for component: CPKMB  No results found for: CHOL, CHOLX, CHLST, CHOLV, HDL, LDL, LDLC, DLDLP, TGLX, TRIGL, TRIGP, CHHD, CHHDX  Lab Results   Component Value Date/Time    Glucose (POC) 124 09/15/2017 09:56 PM     No results found for: COLOR, APPRN, SPGRU, REFSG, KARISSA, PROTU, GLUCU, KETU, BILU, UROU, CONSUELO, LEUKU, GLUKE, EPSU, BACTU, WBCU, RBCU, CASTS, UCRY      Medications Reviewed:     Current Facility-Administered Medications   Medication Dose Route Frequency    budesonide (PULMICORT) 500 mcg/2 ml nebulizer suspension  500 mcg Nebulization BID RT    methylPREDNISolone (PF) (SOLU-MEDROL) injection 40 mg  40 mg IntraVENous Q12H    isosorbide dinitrate (ISORDIL) tablet 5 mg  5 mg Oral TID    spironolactone (ALDACTONE) tablet 12.5 mg  12.5 mg Oral DAILY    bumetanide (BUMEX) tablet 1 mg  1 mg Oral BID    albuterol-ipratropium (DUO-NEB) 2.5 MG-0.5 MG/3 ML  3 mL Nebulization Q4H PRN    fenofibrate nanocrystallized (TRICOR) tablet 48 mg  48 mg Oral DAILY    sertraline (ZOLOFT) tablet 100 mg  100 mg Oral DAILY    sodium chloride (NS) flush 5-10 mL  5-10 mL IntraVENous Q8H    sodium chloride (NS) flush 5-10 mL  5-10 mL IntraVENous PRN    acetaminophen (TYLENOL) tablet 650 mg  650 mg Oral Q4H PRN    heparin (porcine) injection 5,000 Units  5,000 Units SubCUTAneous Q8H    aspirin delayed-release tablet 81 mg  81 mg Oral DAILY    hydrALAZINE (APRESOLINE) tablet 25 mg  25 mg Oral TID    carvedilol (COREG) tablet 6.25 mg  6.25 mg Oral BID WITH MEALS    potassium chloride SR (KLOR-CON 10) tablet 20 mEq  20 mEq Oral BID     ______________________________________________________________________  EXPECTED LENGTH OF STAY: 4d 14h  ACTUAL LENGTH OF STAY:          4                 Nayla Wood MD

## 2017-09-18 NOTE — PROGRESS NOTES
Bedside shift change report received from Phillip Guerrero, [facility]. Report included the following information SBAR, Kardex, Procedure Summary, Intake/Output, MAR and Recent Results. 2000: VSS, 6L NC. No complaints. CAM negative. 0400: No changes. Shift Summary: Uneventful shift, VSS, 6L NC/bipap 2 hours.

## 2017-09-18 NOTE — PROGRESS NOTES
CM noted consult for Trilogy at Home. CM faxed order and supporting documentation to [address] [place] [address]. Fax # [phone]. Ryann Wilson RN CRM    1100 CM spoke with Aram Redmond with [address] [place] [address] and they will trial pt on Trilogy tonight in house with anticipated discharge for Tuesday. Pt is currently on 6L of O2 and will need 10L concentrator at home if she continue to use 6L instead of the 3L she was using at home. Ryann Wilson RN CRM    2621 CM spoke with [facility] (phone # [phone]) and they supply pt with home O2 equipment. Pt's current order for 3L of O2 via [facility] continuous. Ryann Wilson RN CRM    1200 CM sent referral to [facility] via [employer]. Raynn Wilson RN CRM    9404 Malinda Roldan, RRT with [address] [place] [address], (phone # [phone]), needs to be called when she is discharged.    Ryann Wilson RN CRM

## 2017-09-18 NOTE — PROGRESS NOTES
Cardiology Progress Note            Admit Date: 9/14/2017  Admit Diagnosis: SOB (shortness of breath)  SOB (shortness of breath)  Date: 9/18/2017     Time: 9:05 AM    Subjective:  Denies CP or palpitations. Notes she is breathing better and feeling better     Assessment and Plan     1: CHF, systolic   - NYHA class II   - EF 25%   - Coreg 6.25 mg BID   - No ACE/ARB 2/2 CKD   - Hydralazine 25 mg TID   - Start Isordil   - Bumex 1 mg PO BID   - Start Aldactone 12.5 mg daily, watch K   - S/p AICD at [facility]    2: Cardiomyopathy with LVEF 25%   - AICD in place   - GDT meds on board    3: ICD, ? arrhythmia, currently NSR   - Interrogation of ICD shows MAT    4: COPD.   - Pulmonary following   - Triloogy for home    5: ARF on CRF, presumably diuretics/volume contributory   - Seem like baseline 1.5-1.8  6. Body mass index is 68.89 kg/(m^2). Morbidly obese. Diet and exercise discussed    Improving. She has many co morbidities contributing to her overall picture. Her social situation a big factor. She will need to be seen in office within 2 days from discharge as she is high risk for readmission. Patient seen earlier today and examined  and agree with Advance Practice Provider (ENID, NP,PA)  assessment and plans improved CHF   Needs more compliance  Poor EF  AICD  Try optimize CHF meds. Eri Zhang MD 9/18/2017     ROS:  A comprehensive review of systems was negative except for that written in the HPI. Objective:      Physical Exam:                Visit Vitals    BP (!) 166/113    Pulse 67    Temp 97.7 °F (36.5 °C)    Resp 22    Ht 5' 7\" (1.702 m)    Wt (!) 199.5 kg (439 lb 13.1 oz)    SpO2 96%    BMI 68.89 kg/m2        General Appearance:   Well developed, well nourished,alert and oriented x 3, and   individual in no acute distress. Ears/Nose/Mouth/Throat:    Hearing grossly normal.         Neck:  Supple.    Chest:    Lungs clear but diminished to auscultation bilaterally. Cardiovascular:   Regular rate and rhythm, S1, S2 normal, no murmur. Abdomen:    Soft, non-tender, bowel sounds are active. Extremities:  mild edema bilaterally. Skin:  Warm and dry.  Venous stasis changes in feet ankles and shins     Telemetry: normal sinus rhythm          Data Review:    Labs:    Recent Results (from the past 24 hour(s))   METABOLIC PANEL, BASIC    Collection Time: 09/18/17  5:24 AM   Result Value Ref Range    Sodium 141 136 - 145 mmol/L    Potassium 4.5 3.5 - 5.1 mmol/L    Chloride 99 97 - 108 mmol/L    CO2 34 (H) 21 - 32 mmol/L    Anion gap 8 5 - 15 mmol/L    Glucose 117 (H) 65 - 100 mg/dL    BUN 58 (H) 6 - 20 MG/DL    Creatinine 1.87 (H) 0.55 - 1.02 MG/DL    BUN/Creatinine ratio 31 (H) 12 - 20      GFR est AA 33 (L) >60 ml/min/1.73m2    GFR est non-AA 27 (L) >60 ml/min/1.73m2    Calcium 8.8 8.5 - 10.1 MG/DL          Radiology:        Current Facility-Administered Medications   Medication Dose Route Frequency    bumetanide (BUMEX) tablet 1 mg  1 mg Oral BID    methylPREDNISolone (PF) (SOLU-MEDROL) injection 40 mg  40 mg IntraVENous Q8H    albuterol-ipratropium (DUO-NEB) 2.5 MG-0.5 MG/3 ML  3 mL Nebulization Q4H PRN    fenofibrate nanocrystallized (TRICOR) tablet 48 mg  48 mg Oral DAILY    sertraline (ZOLOFT) tablet 100 mg  100 mg Oral DAILY    sodium chloride (NS) flush 5-10 mL  5-10 mL IntraVENous Q8H    sodium chloride (NS) flush 5-10 mL  5-10 mL IntraVENous PRN    acetaminophen (TYLENOL) tablet 650 mg  650 mg Oral Q4H PRN    heparin (porcine) injection 5,000 Units  5,000 Units SubCUTAneous Q8H    aspirin delayed-release tablet 81 mg  81 mg Oral DAILY    hydrALAZINE (APRESOLINE) tablet 25 mg  25 mg Oral TID    carvedilol (COREG) tablet 6.25 mg  6.25 mg Oral BID WITH MEALS    potassium chloride SR (KLOR-CON 10) tablet 20 mEq  20 mEq Oral BID          Sidonie Park Hill, PA-C     [facility] [place] [address] [address] [facility], [address] [place] [address],[address]   [facility], [facility]   [phone]

## 2017-09-18 NOTE — PROGRESS NOTES
Pulmonary, Critical Care, and Sleep Medicine~Progress Note    Name: Daryl Montes De Oca MRN: [id number]   : 1955 [facility]   Date: 2017 8:29 AM Admission: 2017     IMPRESSION:   · Acute on chronic hypercapnic/hypoxic resp failure   · AE of COPD, unknown FEV1. Former smoker  · Obesity  · HFrEF, EF 25%  · Hx of recurrent ascites   · S/p ICD  · CKD      PLAN:   · Trilogy setup: AVAPS-AE mode: Target tidal volumes: 470ml; IPAPmax [id number], IPAPmin 73fil6w, EPAPmax 03jdj7i, EPAPmin 6cmh2o, back up rate auto, I-time 1 sec, FiO2 > 90%, mask of choice  · CPAP and noninvasive bi-level pressure have been considered and ruled out for long term management for her hypercapnic resp failure as she has already failed with outpatient NIV. Patient reports using bi-level pressure support as outpatient basis. obstructive lung disease is suspected to be one of the major driving forces in her hypercapnia. · Recommend pulmicort/brovana nebs as outpatient basis  · O2 titration around 90%  · Diuresis  · Reduce steroids   · Heparin   · Will need follow up in 3-4 wks with trilogy download and abg  · I have filled out paperwork     · Discussed with hospitalist      Daily Progression:    Sitting on the side of the eating breakfast. No acute complaints     OBJECTIVE:     Vital Signs:       Visit Vitals    BP (!) 145/92    Pulse 60    Temp 97.6 °F (36.4 °C)    Resp 20    Ht 5' 7\" (1.702 m)    Wt (!) 199.5 kg (439 lb 13.1 oz)    SpO2 93%    BMI 68.89 kg/m2      Temp (24hrs), Av.7 °F (36.5 °C), Min:97.6 °F (36.4 °C), Max:97.8 °F (36.6 °C)     Intake/Output:     Last shift:      Last 3 shifts:  1901 -  0700  In: 702 [P. O.:702]  Out: 625 [Urine:625]        Intake/Output Summary (Last 24 hours) at 17 0829  Last data filed at 17 0500   Gross per 24 hour   Intake              462 ml   Output              275 ml   Net 187 ml       Physical Exam:                                        Exam Findings Other   General: No resp distress noted, appears stated age    [de-identified]:  No ulcers, JVD not elevated, no cervical LAD    Chest: No pectus deformity, normal chest rise b/l    HEART:  RRR, no murmurs/rubs/gallops    Lungs:  diminished BS at bases    ABD: Soft/NT, non rigid mildly distended    EXT: No cyanosis/clubbing/edema, normal peripheral pulses    Skin: No rashes or ulcers, no mottling    Neuro:  A/O x 3        Medications:  Current Facility-Administered Medications   Medication Dose Route Frequency    bumetanide (BUMEX) tablet 1 mg  1 mg Oral BID    methylPREDNISolone (PF) (SOLU-MEDROL) injection 40 mg  40 mg IntraVENous Q8H    albuterol-ipratropium (DUO-NEB) 2.5 MG-0.5 MG/3 ML  3 mL Nebulization Q4H PRN    fenofibrate nanocrystallized (TRICOR) tablet 48 mg  48 mg Oral DAILY    sertraline (ZOLOFT) tablet 100 mg  100 mg Oral DAILY    sodium chloride (NS) flush 5-10 mL  5-10 mL IntraVENous Q8H    sodium chloride (NS) flush 5-10 mL  5-10 mL IntraVENous PRN    acetaminophen (TYLENOL) tablet 650 mg  650 mg Oral Q4H PRN    heparin (porcine) injection 5,000 Units  5,000 Units SubCUTAneous Q8H    aspirin delayed-release tablet 81 mg  81 mg Oral DAILY    hydrALAZINE (APRESOLINE) tablet 25 mg  25 mg Oral TID    carvedilol (COREG) tablet 6.25 mg  6.25 mg Oral BID WITH MEALS    potassium chloride SR (KLOR-CON 10) tablet 20 mEq  20 mEq Oral BID       Labs:  ABG No results for input(s): PHI, PCO2I, PO2I, HCO3I, SO2I, FIO2I in the last 72 hours.      CBC Recent Labs      09/16/17   0516   WBC  5.5   HGB  13.0   HCT  42.1   PLT  173   MCV  99.1*   MCH  07.4        Metabolic  Panel Recent Labs      09/18/17   0524  09/17/17   0451  09/16/17   0516   NA  141  141  141   K  4.5  4.5  4.2   CL  99  97  96*   CO2  34*  37*  36*   GLU  117*  138*  133*   BUN  58*  52*  43*   CREA  1.87*  1.88*  1.53*   CA  8.8  9.2  9.3   MG   -- 2.4   --         Pertinent Labs                Luis Hannah  9/18/2017

## 2017-09-18 NOTE — PROGRESS NOTES
Problem: Falls - Risk of  Goal: *Absence of Falls  Document Nas Fall Risk and appropriate interventions in the flowsheet.    Outcome: Progressing Towards Goal  Fall Risk Interventions:  Mobility Interventions: Assess mobility with egress test, Bed/chair exit alarm, Communicate number of staff needed for ambulation/transfer, Patient to call before getting OOB, Strengthening exercises (ROM-active/passive), Utilize walker, cane, or other assitive device           Medication Interventions: Assess postural VS orthostatic hypotension, Bed/chair exit alarm, Evaluate medications/consider consulting pharmacy     Elimination Interventions: Bed/chair exit alarm, Call light in reach, Patient to call for help with toileting needs     History of Falls Interventions: Door open when patient unattended, Evaluate medications/consider consulting pharmacy

## 2017-09-18 NOTE — PROGRESS NOTES
PT Note:    Orders received and acknowledged. Chart reviewed. Attempted x 2, both attempts patient meeting with respiratory therapy and discussing Trilogy. Will follow up tomorrow for PT evaluation.

## 2017-09-19 NOTE — PROGRESS NOTES
Problem: Mobility Impaired (Adult and Pediatric)  Goal: *Acute Goals and Plan of Care (Insert Text)  Physical Therapy Goals  Initiated 9/19/2017  1. Patient will move from supine to sit and sit to supine , scoot up and down and roll side to side in bed with modified independence within 7 day(s). 2. Patient will transfer from bed to chair and chair to bed with modified independence using the least restrictive device within 7 day(s). 3. Patient will perform sit to stand from wheelchair with modified independence within 7 day(s). 4. Patient will ambulate with modified independence for 20 feet with the least restrictive device within 7 day(s). PHYSICAL THERAPY EVALUATION  Patient: Joseline Russell (89 y.o. female)  Date: 9/19/2017  Primary Diagnosis: SOB (shortness of breath)  SOB (shortness of breath)        Precautions: Fall         ASSESSMENT :  Based on the objective data described below, the patient presents with decreased functional mobility compared to baseline with decreased tolerance to activity, decreased strength, and decreased ROM. Chart reviewed and RN cleared patient for mobility. Patient was agreeable to participate and was received in bed. Patient was received on 6L O2 NC with O2 sats around 92% and reports she is on oxygen at home (3L baseline). Patient report she lives alone in 1 story home with 1 step to get in the door after the wheelchair ramp and uses a power wheel chair and rolling walker in the house. Per patient report she was fairly independent at home prior to the last few weeks. Today, patient required supervision for transferring to sitting at EOB and for standing however was only able to take a few steps laterally before requesting to have a rest break. Patient's O2 sats decreased to 88% but recovered with ~2 minute rest break. When asked to stand again, patient respectfully refused to stand due to labored breathing and requested to get back in bed.  Patient's RR was between 25-33 but stabilized <20 once back in bed. Transferring to bed required Mod A x 1 for BLE management. Patient ended session with all needs placed within reach and PT instruction to perform LE ther ex 3x/day. RN notified of patient;s progress and level of assistance. Patient will benefit from skilled intervention to address the above impairments. Patients rehabilitation potential is considered to be Fair  Factors which may influence rehabilitation potential include:   [ ]         None noted  [ ]         Mental ability/status  [ ]         Medical condition  [X]         Home/family situation and support systems  [ ]         Safety awareness  [ ]         Pain tolerance/management  [ ]         Other:        PLAN :  Recommendations and Planned Interventions:  [ ]           Bed Mobility Training             [ ]    Neuromuscular Re-Education  [ ]           Transfer Training                   [ ]    Orthotic/Prosthetic Training  [ ]           Gait Training                         [ ]    Modalities  [ ]           Therapeutic Exercises           [ ]    Edema Management/Control  [ ]           Therapeutic Activities            [ ]    Patient and Family Training/Education  [ ]           Other (comment):     Frequency/Duration: PASSESSMENT :  Based on the objective data described below, the patient presents with decreased functional mobility compared to baseline with decreased tolerance to activity, decreased strength, and decreased ROM. Chart reviewed and RN cleared patient for mobility. Patient was agreeable to participate and was received in bed. Patient was received on 6L O2 NC with O2 sats around 92% and reports she is on oxygen at home (3L baseline). Patient report she lives alone in 1 story home with 1 step to get in the door after the wheelchair ramp and uses a power wheel chair and rolling walker in the house. Per patient report she was fairly independent at home prior to the last few weeks.  Today, patient required supervision for transferring to sitting at EOB and for standing however was only able to take a few steps laterally before requesting to have a rest break. Patient's O2 sats decreased to 88% but recovered with ~2 minute rest break. When asked to stand again, patient respectfully refused to stand due to labored breathing and requested to get back in bed. Patient's RR was between 25-33 but stabilized <20 once back in bed. Transferring to bed required Mod A x 1 for BLE management. Patient ended session with all needs placed within reach and PT instruction to perform LE ther ex 3x/day. RN notified of patient;s progress and level of assistance. Patient will benefit from skilled intervention to address the above impairments. Patients rehabilitation potential is considered to be Fair  Factors which may influence rehabilitation potential include:   [ ]         None noted  [ ]         Mental ability/status  [ ]         Medical condition  [X]         Home/family situation and support systems  [ ]         Safety awareness  [ ]         Pain tolerance/management  [X]         Other: atient will be followed by physical therapy  5 times a week to address goals. Discharge Recommendations: [place] vs HHPT with aids for mobility  Further Equipment Recommendations for Discharge: TBD       SUBJECTIVE:   Patient stated I need help but I dont have it at home.       OBJECTIVE DATA SUMMARY:   HISTORY:    Past Medical History:   Diagnosis Date    Chronic obstructive pulmonary disease ([facility]      Heart failure ([facility] [facility]      Hypertension       Past Surgical History:   Procedure Laterality Date    HX PACEMAKER         Prior Level of Function/Home Situation: Mod I with most ADLs, required assistance with some mobility tasks  Personal factors and/or comorbidities impacting plan of care:      Home Situation  Home Environment: Private residence  # Steps to Enter: 4  Rails to Enter: Yes  Hand Rails : Bilateral  Wheelchair Ramp: Yes  One/Two Story Residence: One story  Living Alone: Yes  Patient Expects to be Discharged to[de-identified] Private residence  Current DME Used/Available at Home: Wheelchair, power, Walker, rolling  Tub or Shower Type:  (unable to do so without help)     EXAMINATION/PRESENTATION/DECISION MAKING:   Critical Behavior:              Hearing: Auditory  Auditory Impairment: None  Skin:    Edema:   Range Of Motion:  AROM: Generally decreased, functional           PROM: Generally decreased, functional           Strength:    Strength: Within functional limits                    Tone & Sensation:                                  Coordination:  Coordination: Within functional limits  Vision:      Functional Mobility:  Bed Mobility:  Rolling: Supervision  Supine to Sit: Supervision  Sit to Supine: Moderate assistance;Assist x1 (BLE)     Transfers:  Sit to Stand: Supervision  Stand to Sit: Supervision                       Balance:   Sitting: Intact  Standing: Impaired; With support  Standing - Static: Fair;Constant support  Standing - Dynamic : Fair  Ambulation/Gait Training:                                                                               Stairs: Therapeutic Exercises:    Ankle pumps x 10 each  Heel slides x 10 each  Glute squeeze x 10 each     Functional Measure:  Tinetti test:      Sitting Balance: 1  Arises: 1  Attempts to Rise: 2  Immediate Standing Balance: 1  Standing Balance: 1  Nudged: 1  Eyes Closed: 0  Turn 360 Degrees - Continuous/Discontinuous: 0  Turn 360 Degrees - Steady/Unsteady: 0  Sitting Down: 1  Balance Score: 8  Indication of Gait: 1  R Step Length/Height: 1  L Step Length/Height: 1  R Foot Clearance: 1  L Foot Clearance: 1  Step Symmetry: 1  Step Continuity: 1  Path: 0  Trunk: 0  Walking Time: 0  Gait Score: 7  Total Score: 15         Tinetti Test and G-code impairment scale:  Percentage of Impairment CH     0%    CI     1-19% CJ     20-39% CK     40-59% CL     60-79% CM 80-99% CN      100%   Tinetti  Score 0-28 28 23-27 17-22 12-16 6-11 1-5 0          Tinetti Tool Score Risk of Falls  <19 = High Fall Risk  19-24 = Moderate Fall Risk  25-28 = Low Fall Risk  Tinetti ME. Performance-Oriented Assessment of Mobility Problems in Elderly Patients. [facility]; [id number]. (Scoring Description: PT Bulletin Feb. 10, 1993)     Older adults: Steven Casarez et al, 2009; n = 1000 [facility] elderly evaluated with ABC, NAOMI, ADL, and IADL)  · Mean NAOMI score for males aged 69-68 years = 26.21(3.40)  · Mean NAOMI score for females age 69-68 years = 25.16(4.30)  · Mean NAOMI score for males over 80 years = 23.29(6.02)  · Mean NAOMI score for females over 80 years = 17.20(8.32)            G codes: In compliance with CMSs Claims Based Outcome Reporting, the following G-code set was chosen for this patient based on their primary functional limitation being treated: The outcome measure chosen to determine the severity of the functional limitation was the Tinetti with a score of 15/28 which was correlated with the impairment scale.       · Mobility - Walking and Moving Around:               - CURRENT STATUS:    CK - 40%-59% impaired, limited or restricted               - GOAL STATUS:           CJ - 20%-39% impaired, limited or restricted               - D/C STATUS:                       ---------------To be determined---------------      Physical Therapy Evaluation Charge Determination   History Examination Presentation Decision-Making   MEDIUM  Complexity : 1-2 comorbidities / personal factors will impact the outcome/ POC  LOW Complexity : 1-2 Standardized tests and measures addressing body structure, function, activity limitation and / or participation in recreation  LOW Complexity : Stable, uncomplicated  Other outcome measures Tinetti  LOW       Based on the above components, the patient evaluation is determined to be of the following complexity level: LOW      Pain:  Pain Scale 1: Numeric (0 - 10)  Pain Intensity 1: 0              Activity Tolerance:   Fair, required extended rest break following minimal activity      Please refer to the flowsheet for vital signs taken during this treatment. After treatment:   [ ]         Patient left in no apparent distress sitting up in chair  [X]         Patient left in no apparent distress in bed  [X]         Call bell left within reach  [X]         Nursing notified  [ ]         Caregiver present  [ ]         Bed alarm activated      COMMUNICATION/EDUCATION:   The patients plan of care was discussed with: Occupational Therapist and Registered Nurse.  [X]         Fall prevention education was provided and the patient/caregiver indicated understanding. [X]         Patient/family have participated as able in goal setting and plan of care. [X]         Patient/family agree to work toward stated goals and plan of care. [ ]         Patient understands intent and goals of therapy, but is neutral about his/her participation. [ ]         Patient is unable to participate in goal setting and plan of care.      Thank you for this referral.  Saundra Good   Time Calculation: 28 mins

## 2017-09-19 NOTE — WOUND CARE
WOCN Note:     New consult placed by RN for assessment of panus and lower legs. Chart shows:  Admitted for SOB (shortness of breath)  SOB (shortness of breath)  ; history of   Past Medical History:   Diagnosis Date    Chronic obstructive pulmonary disease ([address]     Heart failure ([facility] [facility]     Hypertension        Assessment:   Patient is A&O x 3, communicative, continent with bedside commode and resting in bed. Bed: total care bariatric  Diet: cardiac  Patient reports no pain. All areas are present on admission. Bilateral heel, buttocks, and sacral skin intact and without erythema. Dyschromia noted to gluteal cleft. Bilateral lower legs dry, intact with hemosiderin staining. Panus intact with moist skin; no redness or rash noted. Skin Care & Pressure Prevention:  Cleanse and dry panus and other skin folds daily and as needed. Minimize layers of linen/pads under patient to optimize support surface. Turn/reposition approximately every 2 hours and offload heels. Manage incontinence / promote continence; Aloe Vesta to sacrum and buttocks. Specialty bed: Total care Bariatric Plus. Use only flat sheet and one incontinence pad.      Discussed above plan with patient and Staci Barillas RN    Transition of Care: Plan to follow weekly and as needed while admitted to hospital.    ZAHRA Victor RN  Office [phone]  Pager [phone]

## 2017-09-19 NOTE — PROGRESS NOTES
Spiritual Care Assessment/Progress Notes    [facility] [id number]  [id number]    1955  58 y.o.  female    Patient Telephone Number: [phone] (home)   Presybeterian Affiliation: Other   Language: English   Extended Emergency Contact Information  Primary Emergency Contact: [address] Phone: [phone]  Relation: Sister   Patient Active Problem List    Diagnosis Date Noted    SOB (shortness of breath) 09/14/2017        Date: 9/19/2017       Level of Presybeterian/Spiritual Activity:  []         Involved in tamia tradition/spiritual practice    []         Not involved in tamia tradition/spiritual practice  []         Spiritually oriented    []         Claims no spiritual orientation    []         seeking spiritual identity  []         Feels alienated from Restorationism practice/tradition  []         Feels angry about Restorationism practice/tradition  []         Spirituality/Restorationism tradition a resource for coping at this time.   [x]         Not able to assess due to medical condition    Services Provided Today:  []         crisis intervention    []         reading Scriptures  []         spiritual assessment    []         prayer  []         empathic listening/emotional support  []         rites and rituals (cite in comments)  []         life review     []         Restorationism support  []         theological development   []         advocacy  []         ethical dialog     []         blessing  []         bereavement support    []         support to family  []         anticipatory grief support   []         help with AMD  []         spiritual guidance    []         meditation      Spiritual Care Needs  []         Emotional Support  []         Spiritual/Presybeterian Care  []         Loss/Adjustment  []         Advocacy/Referral                /Ethics  []         No needs expressed at               this time  []         Other: (note in               comments)  [address]  [] Follow up visits with               pt/family  []         Provide materials  []         Schedule sacraments  []         [place]  []         Follow up as needed  []         Other: (note in               comments)     Comments: Visited Ms Ken Harman in [facility] for initial spiritual assessment. Patient appeared to be sleeping soundly and no family was present; unable to do assessment at that time. Left note at bedside assuring patient and family of prayers on their behalf and of  availability for support. : Rev. Abby Jarquin.  Angel Maria; [facility], to contact [phone] [facility] call: [phone]

## 2017-09-19 NOTE — PROGRESS NOTES
Pulmonary, Critical Care, and Sleep Medicine~Progress Note    Name: Arthur Guadalupe MRN: [id number]   : 1955 Hospital: [facility]    Date: 2017 8:29 AM Admission: 2017     IMPRESSION:   · Acute on chronic hypercapnic/hypoxic resp failure   · AE of COPD, unknown FEV1. Former smoker  · Obesity  · HFrEF, EF 25%  · Hx of recurrent ascites   · S/p ICD  · CKD      PLAN:   · Trilogy setup: AVAPS-AE mode: Target tidal volumes: 470ml; IPAPmax 56vzv2c, IPAPmin 78ing2r, EPAPmax 62twl1d, EPAPmin 6cmh2o, back up rate auto, I-time 1 sec, FiO2 > 90%, mask of choice  · CPAP and noninvasive bi-level pressure have been considered and ruled out for long term management for her hypercapnic resp failure as she has already failed with outpatient NIV. Patient reports using bi-level pressure support as outpatient basis. obstructive lung disease is suspected to be one of the major driving forces in her hypercapnia. · Recommend pulmicort/brovana nebs as outpatient basis  · O2 titration around 90%  · Diuresis  · Reduce steroids   · Heparin   · Will need follow up in 3-4 wks with trilogy download and abg; she understood and verbally agreed to this  · Add a few doses of mucomyst today     Daily Progression:    Some congestion noted.  Really liked trilogy last night    OBJECTIVE:     Vital Signs:       Visit Vitals    /90    Pulse 69    Temp 97.6 °F (36.4 °C)    Resp 22    Ht 5' 7\" (1.702 m)    Wt (!) 203.8 kg (449 lb 4.8 oz)    SpO2 93%    BMI 70.37 kg/m2      Temp (24hrs), Av.6 °F (36.4 °C), Min:97.3 °F (36.3 °C), Max:97.9 °F (36.6 °C)     Intake/Output:     Last shift:      Last 3 shifts:  1901 -  0700  In: 720 [P.O.:720]  Out: 750 [Urine:750]          Intake/Output Summary (Last 24 hours) at 17 0825  Last data filed at 17 0000   Gross per 24 hour   Intake              360 ml   Output              475 ml   Net -115 ml       Physical Exam:                                        Exam Findings Other   General: No resp distress noted, appears stated age    [de-identified]:  No ulcers, JVD not elevated, no cervical LAD    Chest: No pectus deformity, normal chest rise b/l    HEART:  RRR, no murmurs/rubs/gallops    Lungs:  diminished BS at bases    ABD: Soft/NT, non rigid mildly distended    EXT: No cyanosis/clubbing/edema, normal peripheral pulses    Skin: No rashes or ulcers, no mottling    Neuro:  A/O x 3        Medications:  Current Facility-Administered Medications   Medication Dose Route Frequency    budesonide (PULMICORT) 500 mcg/2 ml nebulizer suspension  500 mcg Nebulization BID RT    methylPREDNISolone (PF) (SOLU-MEDROL) injection 40 mg  40 mg IntraVENous Q12H    isosorbide dinitrate (ISORDIL) tablet 5 mg  5 mg Oral TID    spironolactone (ALDACTONE) tablet 12.5 mg  12.5 mg Oral DAILY    bumetanide (BUMEX) tablet 1 mg  1 mg Oral BID    albuterol-ipratropium (DUO-NEB) 2.5 MG-0.5 MG/3 ML  3 mL Nebulization Q4H PRN    fenofibrate nanocrystallized (TRICOR) tablet 48 mg  48 mg Oral DAILY    sertraline (ZOLOFT) tablet 100 mg  100 mg Oral DAILY    sodium chloride (NS) flush 5-10 mL  5-10 mL IntraVENous Q8H    sodium chloride (NS) flush 5-10 mL  5-10 mL IntraVENous PRN    acetaminophen (TYLENOL) tablet 650 mg  650 mg Oral Q4H PRN    heparin (porcine) injection 5,000 Units  5,000 Units SubCUTAneous Q8H    aspirin delayed-release tablet 81 mg  81 mg Oral DAILY    hydrALAZINE (APRESOLINE) tablet 25 mg  25 mg Oral TID    carvedilol (COREG) tablet 6.25 mg  6.25 mg Oral BID WITH MEALS    potassium chloride SR (KLOR-CON 10) tablet 20 mEq  20 mEq Oral BID       Labs:  ABG No results for input(s): PHI, PCO2I, PO2I, HCO3I, SO2I, FIO2I in the last 72 hours. CBC No results for input(s): WBC, HGB, HCT, PLT, MCV, MCH, HGBEXT, HCTEXT, PLTEXT, HGBEXT, HCTEXT, PLTEXT in the last 72 hours.      Metabolic  Panel Recent Labs 09/18/17   0524  09/17/17   0451   NA  141  141   K  4.5  4.5   CL  99  97   CO2  34*  37*   GLU  117*  138*   BUN  58*  52*   CREA  1.87*  1.88*   CA  8.8  9.2   MG   --   2.4        Pertinent Labs                JORDY Gooden  9/19/2017

## 2017-09-19 NOTE — PROGRESS NOTES
CM reviewed case with treatment team during Bedside Interdisciplinary Rounds. Patient continues with treatment for respiratory issues with PT and OT following. Patient has been referred for trilogy with [address] will need notification at discharge. Patient has been referred to [facility], and [facility] is following. CM to continue to follow for discharge planning needs.

## 2017-09-19 NOTE — PROGRESS NOTES
Problem: Self Care Deficits Care Plan (Adult)  Goal: *Acute Goals and Plan of Care (Insert Text)  Occupational Therapy Goals  Initiated 9/19/2017  1. Patient will complete dressing with supervision within 7 days. 2. Patient will complete toilet transfer with supervision within 7 days. 3. Patient will complete toileting within supervision within 7 days. 4. Patient will complete bathing with supervision within 7 days. OCCUPATIONAL THERAPY EVALUATION  Patient: Esmer Carey (55 y.o. female)  Date: 9/19/2017  Primary Diagnosis: SOB (shortness of breath)  SOB (shortness of breath)        Precautions:   Fall      ASSESSMENT :  Based on the objective data described below, the patient presents with decreased independence with self care and functional mobility following admission for respiratory failure and COPD exacerbation. Pt was able to progress OOB to chair with cues and instructions. She does have BELL with activity and need cues for deep breathing tasks. Pt does have adaptive strategies for ADL tasks at home and will need to continue to assess ability to care for self at discharge. At this time, recommend discharge to rehab setting as she is unable to meet her self care needs at this time. Patient will benefit from skilled intervention to address the above impairments.   Patients rehabilitation potential is considered to be Good  Factors which may influence rehabilitation potential include:   [X]             None noted  [ ]             Mental ability/status  [ ]             Medical condition  [ ]             Home/family situation and support systems  [ ]             Safety awareness  [ ]             Pain tolerance/management  [ ]             Other:        PLAN :  Recommendations and Planned Interventions:  [X]               Self Care Training                  [X]        Therapeutic Activities  [X]               Functional Mobility Training    [ ]        Cognitive Retraining  [X]               Therapeutic Exercises           [X]        Endurance Activities  [X]               Balance Training                   [ ]        Neuromuscular Re-Education  [ ]               Visual/Perceptual Training     [X]   Home Safety Training  [X]               Patient Education                 [X]        Family Training/Education  [ ]               Other (comment):     Frequency/Duration: Patient will be followed by occupational therapy 5 times a week to address goals. Discharge Recommendations: Rehab  Further Equipment Recommendations for Discharge: TBD       SUBJECTIVE:   Patient stated I am willing to try.       OBJECTIVE DATA SUMMARY:   HISTORY:   Past Medical History:   Diagnosis Date    [facility] pulmonary [facility] [address] [facility]      Heart failure ([facility] [facility]      Hypertension       Past Surgical History:   Procedure Laterality Date    HX PACEMAKER            Prior Level of Function/Home Situation: Pt lives with her  but they are . She lives at one end of the home and he lives at the other. He will not assist her at home. She is on O2 at baseline. Expanded or extensive additional review of patient history:      Home Situation  Home Environment: Private residence  # Steps to Enter: 4  Rails to Enter: Yes  Hand Rails : Bilateral  Wheelchair Ramp: Yes  One/Two Story Residence: One story  Living Alone: Yes  Patient Expects to be Discharged to[de-identified] Private residence  Current DME Used/Available at Home: Wheelchair  Tub or Shower Type: Tub/Shower combination  [X]  Right hand dominant             [ ]  Left hand dominant     EXAMINATION OF PERFORMANCE DEFICITS:  Cognitive/Behavioral Status:  Neurologic State: Alert  Orientation Level: Oriented X4  Cognition: Appropriate for age attention/concentration  Perception: Appears intact  Perseveration: No perseveration noted  Safety/Judgement: Good awareness of safety precautions     Skin: see nursing notes     Edema: none noted     Hearing:   Auditory  Auditory Impairment: None     Vision/Perceptual:                           Acuity: Within Defined Limits          Range of Motion:  Pt is limited by overall body habitus. AROM: Generally decreased, functional  PROM: Generally decreased, functional                       Strength:  Pt is limited by body habitus. Strength: Generally decreased, functional                 Coordination:  Coordination: Generally decreased, functional  Fine Motor Skills-Upper: Right Intact; Left Intact    Gross Motor Skills-Upper: Right Intact; Left Intact     Tone & Sensation:     Tone: Normal  Sensation: Intact                       Balance:  Sitting: Intact  Standing: Impaired; With support  Standing - Static: Fair  Standing - Dynamic : Fair     Functional Mobility and Transfers for ADLs:  Bed Mobility:  Rolling: Supervision  Supine to Sit: Additional time;Minimum assistance  Sit to Supine:  (remained in chair)     Transfers:  Sit to Stand: Contact guard assistance  Stand to Sit: Contact guard assistance  Bed to Chair: Contact guard assistance  Toilet Transfer : Contact guard assistance     ADL Assessment:  Feeding: Supervision     Oral Facial Hygiene/Grooming: Supervision     Bathing: Total assistance     Upper Body Dressing: Moderate assistance     Lower Body Dressing: Total assistance     Toileting: Total assistance                 ADL Intervention and task modifications:     Pt progressed OOB to chair to complete ADL assessment. Pt will need AE training to possibly improve her independence at home.             Cognitive Retraining  Safety/Judgement: Good awareness of safety precautions     Functional Measure:  Barthel Index:      Bathin  Bladder: 5  Bowels: 5  Groomin  Dressin  Feeding: 10  Mobility: 0  Stairs: 0  Toilet Use: 5  Transfer (Bed to Chair and Back): 10  Total: 45         Barthel and G-code impairment scale:  Percentage of impairment CH  0% CI  1-19% CJ  20-39% CK  40-59% CL  60-79% CM  80-99% CN  100%   Barthel Score 0-100 100 99-80 79-60 59-40 20-39 1-19    0   Barthel Score 0-20 20 17-19 13-16 9-12 5-8 1-4 0      The Barthel ADL Index: Guidelines  1. The index should be used as a record of what a patient does, not as a record of what a patient could do. 2. The main aim is to establish degree of independence from any help, physical or verbal, however minor and for whatever reason. 3. The need for supervision renders the patient not independent. 4. A patient's performance should be established using the best available evidence. Asking the patient, friends/relatives and nurses are the usual sources, but direct observation and common sense are also important. However direct testing is not needed. 5. Usually the patient's performance over the preceding 24-48 hours is important, but occasionally longer periods will be relevant. 6. Middle categories imply that the patient supplies over 50 per cent of the effort. 7. Use of aids to be independent is allowed. Deysi Butterfield., Barthel, D.W. (9857). Functional evaluation: the Barthel Index. [address] [facility] (14)2. Mark Manning jovan EMMA Henry, Rodriguez Rosas., Brisa Archuleta, Adrian Cutler, [address] (1999). Measuring the change indisability after inpatient rehabilitation; comparison of the responsiveness of the Barthel Index and Functional [place] Measure. Journal of Neurology, Neurosurgery, and Psychiatry, 66(4), 169-897. Desi Kevin, N.J.A, Enrique Kwon,  W.J.DEVANTE, & Med Wong M.BRYSON. (2004.) Assessment of post-stroke quality of life in cost-effectiveness studies: The usefulness of the Barthel Index and the EuroQoL-5D. Quality of Life Research, 13, 372-60      G codes: In compliance with CMSs Claims Based Outcome Reporting, the following G-code set was chosen for this patient based on their primary functional limitation being treated:      The outcome measure chosen to determine the severity of the functional limitation was the Barthel Index with a score of 45/100 which was correlated with the impairment scale. · Self Care:               - CURRENT STATUS:    CK - 40%-59% impaired, limited or restricted               - GOAL STATUS:           CI - 1%-19% impaired, limited or restricted               - D/C STATUS:                       ---------------To be determined---------------      Occupational Therapy Evaluation Charge Determination   History Examination Decision-Making   LOW Complexity : Brief history review  MEDIUM Complexity : 3-5 performance deficits relating to physical, cognitive , or psychosocial skils that result in activity limitations and / or participation restrictions MEDIUM Complexity : Patient may present with comorbidities that affect occupational performnce. Miniml to moderate modification of tasks or assistance (eg, physical or verbal ) with assesment(s) is necessary to enable patient to complete evaluation       Based on the above components, the patient evaluation is determined to be of the following complexity level: LOW   Pain:  Pain Scale 1: Numeric (0 - 10)  Pain Intensity 1: 0              Activity Tolerance:   VSS throughout session. After treatment:   [X] Patient left in no apparent distress sitting up in chair  [ ] Patient left in no apparent distress in bed  [X] Call bell left within reach  [X] Nursing notified  [ ] Caregiver present  [ ] Bed alarm activated      COMMUNICATION/EDUCATION:   The patients plan of care was discussed with: Physical Therapist and Registered Nurse.  [X] Home safety education was provided and the patient/caregiver indicated understanding. [ ] Patient/family have participated as able in goal setting and plan of care. [X] Patient/family agree to work toward stated goals and plan of care. [ ] Patient understands intent and goals of therapy, but is neutral about his/her participation. [ ] Patient is unable to participate in goal setting and plan of care. This patients plan of care is appropriate for delegation to hospitals. Thank you for this referral.  Rohan Rawls, OT  Time Calculation: 17 mins                          \

## 2017-09-19 NOTE — PROGRESS NOTES
Attended interdisciplinary rounds in ICU. : Rev. Germain Michaels.  Dottie Sands; [facility]; to contact [phone] [address] call: [phone]

## 2017-09-19 NOTE — PROGRESS NOTES
Hospitalist Progress Note  Monique Reynoso MD  Office: [phone]  Cell: [phone]      Date of Service:  2017  NAME:  Daryl Montes De Oca  :  1955  MRN:  [id number]      Admission Summary:   57 yo female with PMhx of Chronic respiratory failure due to COPD on 3L O2 and CPAP HS/PRN, HTN, Chronic systolic CHF s/p AICD, admitted for ongoing SOB and wheezing for 3 days    Interval history / Subjective:     Pt seen and examined   doing well  Slightly wheezy today         Assessment & Plan:     Acute on Chronic Respiratory failure with Hypercapnia due to COPD Exacerbation  - on BIPAP at night  - needs to go home on Trilogy, seen by Pulmonary and instructions are on note from   - c/w O2 3L during day time  - c/w Nebs, continue bravana/pulmicort at discharge  - steroids from IV to PO Prednisone   - V/Q scan: very low prob for PE    Acute Pulmonary Edema due to Acute on Chronic Systolic CHF NYHA IV on admission   - on Bumex changed to PO BID due to rise in Cr  - c/w Coreg  - hold ACEI/ARBs due to renal dysfunction.   - I/O  - aldactone and Imdur added by cardio      Acute Kidney Insufficiency on CKD 3/4 due to diuresis   -  May have to accept high Cr to improve respiratory status   -  Stable     S/p AICD  - Interrogation showed 73 episodes of VT terminated by ATP and MAT  - EP considering Mexiletine if recurs   - cardiology following  - ECHO noted  - EKG with LBBB, may need BIV ICD upgrade, can be done as outpt    Mildly elevated Trop  Due to CHF/Acute Respf ailure  - d/c trend     HTN  - c/w Coreg and Hydralazine     Morbidly Obese  - diet modification and wt loss recommended      Code status: FULL  DVT prophylaxis: SCDs    Care Plan discussed with: Patient/Family  Disposition: Home w/Family and HH PT, OT, RN     Transfer to [facility] Problems  Date Reviewed: 2017          Codes Class Noted POA    * (Principal)SOB (shortness of breath) ICD-10-CM: R06.02  ICD-9-CM: 786.05  9/14/2017 Unknown                Review of Systems:   A comprehensive review of systems was negative except for that written in the HPI. Vital Signs:    Last 24hrs VS reviewed since prior progress note. Most recent are:  Visit Vitals    /83    Pulse 61    Temp 97.8 °F (36.6 °C)    Resp 23    Ht 5' 7\" (1.702 m)    Wt (!) 203.8 kg (449 lb 4.8 oz)    SpO2 93%    BMI 70.37 kg/m2         Intake/Output Summary (Last 24 hours) at 09/19/17 1007  Last data filed at 09/19/17 0000   Gross per 24 hour   Intake              360 ml   Output              475 ml   Net             -115 ml        Physical Examination:             Constitutional:  No acute distress, cooperative, pleasant    ENT:  Oral mucous moist,    Resp:   occasional wheezing, mild rales on Rt   CV:  Regular rhythm, normal rate,     GI:  Soft, morbidly obese, NT    Musculoskeletal:  traceedema, warm, 2+ pulses throughout    Neurologic:  Moves all extremities. AAOx3,      Skin:  chronic venous stasis changes in LE b/l        Data Review:    Review and/or order of clinical lab test  Review and/or order of tests in the radiology section of CPT  Review and/or order of tests in the medicine section of CPT      Labs:     No results for input(s): WBC, HGB, HCT, PLT, HGBEXT, HCTEXT, PLTEXT, HGBEXT, HCTEXT, PLTEXT in the last 72 hours. Recent Labs      09/19/17   0902  09/18/17   0524  09/17/17   0451   NA  140  141  141   K  4.7  4.5  4.5   CL  97  99  97   CO2  36*  34*  37*   BUN  66*  58*  52*   CREA  2.16*  1.87*  1.88*   GLU  110*  117*  138*   CA  9.2  8.8  9.2   MG   --    --   2.4     No results for input(s): SGOT, GPT, ALT, AP, TBIL, TBILI, TP, ALB, GLOB, GGT, AML, LPSE in the last 72 hours. No lab exists for component: AMYP, HLPSE  No results for input(s): INR, PTP, APTT in the last 72 hours.     No lab exists for component: INREXT, INREXT   No results for input(s): FE, TIBC, PSAT, FERR in the last 72 hours. No results found for: FOL, RBCF   No results for input(s): PH, PCO2, PO2 in the last 72 hours. No results for input(s): CPK, CKNDX, TROIQ in the last 72 hours.     No lab exists for component: CPKMB  No results found for: CHOL, CHOLX, CHLST, CHOLV, HDL, LDL, LDLC, DLDLP, TGLX, TRIGL, TRIGP, CHHD, CHHDX  Lab Results   Component Value Date/Time    Glucose (POC) 124 09/15/2017 09:56 PM     No results found for: COLOR, APPRN, SPGRU, REFSG, KARISSA, PROTU, GLUCU, KETU, BILU, UROU, CONSUELO, LEUKU, GLUKE, EPSU, BACTU, WBCU, RBCU, CASTS, UCRY      Medications Reviewed:     Current Facility-Administered Medications   Medication Dose Route Frequency    albuterol-ipratropium (DUO-NEB) 2.5 MG-0.5 MG/3 ML  3 mL Nebulization QID RT    acetylcysteine (MUCOMYST) 200 mg/mL (20 %) solution 400 mg  2 mL Nebulization TID RT    isosorbide dinitrate (ISORDIL) tablet 10 mg  10 mg Oral TID    [START ON 9/20/2017] spironolactone (ALDACTONE) tablet 25 mg  25 mg Oral DAILY    budesonide (PULMICORT) 500 mcg/2 ml nebulizer suspension  500 mcg Nebulization BID RT    methylPREDNISolone (PF) (SOLU-MEDROL) injection 40 mg  40 mg IntraVENous Q12H    bumetanide (BUMEX) tablet 1 mg  1 mg Oral BID    albuterol-ipratropium (DUO-NEB) 2.5 MG-0.5 MG/3 ML  3 mL Nebulization Q4H PRN    fenofibrate nanocrystallized (TRICOR) tablet 48 mg  48 mg Oral DAILY    sertraline (ZOLOFT) tablet 100 mg  100 mg Oral DAILY    sodium chloride (NS) flush 5-10 mL  5-10 mL IntraVENous Q8H    sodium chloride (NS) flush 5-10 mL  5-10 mL IntraVENous PRN    acetaminophen (TYLENOL) tablet 650 mg  650 mg Oral Q4H PRN    heparin (porcine) injection 5,000 Units  5,000 Units SubCUTAneous Q8H    aspirin delayed-release tablet 81 mg  81 mg Oral DAILY    hydrALAZINE (APRESOLINE) tablet 25 mg  25 mg Oral TID    carvedilol (COREG) tablet 6.25 mg  6.25 mg Oral BID WITH MEALS    potassium chloride SR (KLOR-CON 10) tablet 20 mEq  20 mEq Oral BID ______________________________________________________________________  EXPECTED LENGTH OF STAY: 4d 14h  ACTUAL LENGTH OF STAY:          Zhane Flowers MD

## 2017-09-19 NOTE — PROGRESS NOTES
Cardiology Progress Note            Admit Date: 9/14/2017  Admit Diagnosis: SOB (shortness of breath)  SOB (shortness of breath)  Date: 9/19/2017     Time: 9:05 AM    Subjective:  Received laying in bed getting Jet Nebs. She notes these are helpful     Assessment and Plan     1: CHF, systolic   - NYHA class II   - EF 25%   - Coreg 6.25 mg BID   - No ACE/ARB 2/2 CKD   - Hydralazine 25 mg TID   - Isordil 10 mg PO TID   - Bumex 1 mg PO BID   - Aldactone 25 mg daily, watch K   - S[facility]ICD [facility]    2: Cardiomyopathy with LVEF 25%   - AICD in place - wide complex - Perhaps BiV in future   - GDT meds on board    3: ICD, ? arrhythmia, currently NSR   - Interrogation of ICD shows MAT    4: COPD.   - Pulmonary following   - Triloogy for home    5: ARF on CRF, presumably diuretics/volume contributory   - Seem like baseline 1.5-1.8  6. Body mass index is 70.37 kg/(m^2). Morbidly obese. Diet and exercise discussed    Getting there slowly. COPD more of an issue. Optimizing CHF meds, following labs. Wide complex QRS noted, maybe BiV ICD later if GDT therapy not helping. She will need a lot of help from a social standpoint. 9/19/2017 12:11 PM   Patient seen earlier today and examined  and agree with Advance Practice Provider (ENID, NP,PA)  assessment and plans. Edema minimized, chronic venous stasis skin changes  No cp, exam unchanged  On po meds, will increase Isordil dose, ramp them up as bp allows  Needs CM /HH/PT support     Eliseo Maxwell MD 9/19/2017      ROS:  A comprehensive review of systems was negative except for that written in the HPI.     Objective:      Physical Exam:                Visit Vitals    /83    Pulse 65    Temp 97.6 °F (36.4 °C)    Resp 22    Ht 5' 7\" (1.702 m)    Wt (!) 449 lb 4.8 oz (203.8 kg)    SpO2 93%    BMI 70.37 kg/m2        General Appearance:   Well developed, well nourished,alert and oriented x 3, and   individual in no acute distress. Ears/Nose/Mouth/Throat:    Hearing grossly normal.         Neck:  Supple. Chest:    Lungs light crackles but diminished to auscultation bilaterally. Cardiovascular:   Regular rate and rhythm, S1, S2 normal, no murmur. Abdomen:    Soft, non-tender, bowel sounds are active. Very large panus. Extremities:  mild edema bilaterally. Skin:  Warm and dry. Venous stasis changes in feet ankles and shins     Telemetry: normal sinus rhythm          Data Review:    Labs:    No results found for this or any previous visit (from the past 24 hour(s)).        Radiology:        Current Facility-Administered Medications   Medication Dose Route Frequency    albuterol-ipratropium (DUO-NEB) 2.5 MG-0.5 MG/3 ML  3 mL Nebulization QID RT    acetylcysteine (MUCOMYST) 200 mg/mL (20 %) solution 400 mg  2 mL Nebulization TID RT    isosorbide dinitrate (ISORDIL) tablet 10 mg  10 mg Oral TID    budesonide (PULMICORT) 500 mcg/2 ml nebulizer suspension  500 mcg Nebulization BID RT    methylPREDNISolone (PF) (SOLU-MEDROL) injection 40 mg  40 mg IntraVENous Q12H    spironolactone (ALDACTONE) tablet 12.5 mg  12.5 mg Oral DAILY    bumetanide (BUMEX) tablet 1 mg  1 mg Oral BID    albuterol-ipratropium (DUO-NEB) 2.5 MG-0.5 MG/3 ML  3 mL Nebulization Q4H PRN    fenofibrate nanocrystallized (TRICOR) tablet 48 mg  48 mg Oral DAILY    sertraline (ZOLOFT) tablet 100 mg  100 mg Oral DAILY    sodium chloride (NS) flush 5-10 mL  5-10 mL IntraVENous Q8H    sodium chloride (NS) flush 5-10 mL  5-10 mL IntraVENous PRN    acetaminophen (TYLENOL) tablet 650 mg  650 mg Oral Q4H PRN    heparin (porcine) injection 5,000 Units  5,000 Units SubCUTAneous Q8H    aspirin delayed-release tablet 81 mg  81 mg Oral DAILY    hydrALAZINE (APRESOLINE) tablet 25 mg  25 mg Oral TID    carvedilol (COREG) tablet 6.25 mg  6.25 mg Oral BID WITH MEALS    potassium chloride SR (KLOR-CON 10) tablet 20 mEq  20 mEq Oral BID Samuel Fenton PA-C     [facility] [address] [place] [address] Drive, [address] [address]   [facility], [facility]   [phone]

## 2017-09-19 NOTE — PROGRESS NOTES
0730: Bedside and Verbal shift change report given to Boni Reyes RN (oncoming nurse) by Maude Valentine RN (offgoing nurse). Report included the following information SBAR, Kardex, Intake/Output, MAR, Accordion, Recent Results and Cardiac Rhythm paced. 0900: Wound care nurse at bedside. 0945: Patient placed back on trilogy machine, requesting to rest.     1100: Patient resting comfortably in bed. 1930: Uneventful shift. Patient remained on nasal cannula most of shift, sats stable. Up to bedside chair x 2, to bedside commode x 2 with intermittent incontinence. Alert and oriented x 4, good PO intake, no c/o pain. Bedside and Verbal shift change report given to Maude Valentine RN (oncoming nurse) by Manuel Diaz RN (offgoing nurse). Report included the following information SBAR, Kardex, Intake/Output, MAR, Accordion, Recent Results and Cardiac Rhythm paced.

## 2017-09-20 NOTE — PROGRESS NOTES
Hospitalist Progress Note  Camilla Florentino MD  Office: [phone]  Cell: [phone]      Date of Service:  2017  NAME:  Arya Metcalf  :  1955  MRN:  [id number]      Admission Summary:   57 yo female with PMhx of Chronic respiratory failure due to COPD on 3L O2 and CPAP HS/PRN, HTN, Chronic systolic CHF s/p AICD, admitted for ongoing SOB and wheezing for 3 days    Interval history / Subjective:     Pt seen and examined   doing well  No new issues today       Assessment & Plan:     Acute on Chronic Respiratory failure with Hypercapnia due to COPD Exacerbation  - on BIPAP at night  - needs to go home on Trilogy, seen by Pulmonary and instructions are on note from   - c/w O2 3L during day time  - c/w Nebs, continue bravana/pulmicort at discharge  - steroids from IV to PO Prednisone   - V/Q scan: very low prob for PE    Acute Pulmonary Edema due to Acute on Chronic Systolic CHF NYHA IV on admission   - on Bumex changed to PO BID due to rise in Cr  - c/w Coreg  - hold ACEI/ARBs due to renal dysfunction.   - I/O  - aldactone,  Imdur and Hydralazine added by cardio      Acute Kidney Insufficiency on CKD 3/4 due to diuresis   -  May have to accept high Cr to improve respiratory status   -  Stable Cr, likely has baseline CKD 3/4    S/p AICD  - Interrogation showed 73 episodes of VT terminated by ATP and MAT  - EP considering Mexiletine if recurs   - cardiology following  - ECHO noted  - EKG with LBBB, may need BIV ICD upgrade, can be done as outpt    Mildly elevated Trop  Due to CHF/Acute Respf ailure  - d/c trend     HTN  - c/w Coreg and Hydralazine     Morbidly Obese  - diet modification and wt loss recommended      Code status: FULL  DVT prophylaxis: SCDs    Care Plan discussed with: Patient/Family  Disposition: Home w/Family and HH PT, OT, RN     D/C pending [address] Problems  Date Reviewed: 2017          Codes Class Noted POA    * (Principal)SOB (shortness of breath) ICD-10-CM: R06.02  ICD-9-CM: 786.05  9/14/2017 Unknown                Review of Systems:   A comprehensive review of systems was negative except for that written in the HPI. Vital Signs:    Last 24hrs VS reviewed since prior progress note. Most recent are:  Visit Vitals    /72    Pulse 61    Temp 97.5 °F (36.4 °C)    Resp 17    Ht 5' 7\" (1.702 m)    Wt (!) 201.1 kg (443 lb 5.5 oz)    SpO2 (!) 86%    BMI 69.44 kg/m2         Intake/Output Summary (Last 24 hours) at 09/20/17 1407  Last data filed at 09/20/17 0813   Gross per 24 hour   Intake                0 ml   Output              100 ml   Net             -100 ml        Physical Examination:             Constitutional:  No acute distress, cooperative, pleasant    ENT:  Oral mucous moist,    Resp:   Good air entry bilaterally   CV:  Regular rhythm, normal rate,     GI:  Soft, morbidly obese, NT    Musculoskeletal:  traceedema, warm, 2+ pulses throughout    Neurologic:  Moves all extremities. AAOx3,      Skin:  chronic venous stasis changes in LE b/l        Data Review:    Review and/or order of clinical lab test  Review and/or order of tests in the radiology section of CPT  Review and/or order of tests in the medicine section of CPT      Labs:     No results for input(s): WBC, HGB, HCT, PLT, HGBEXT, HCTEXT, PLTEXT, HGBEXT, HCTEXT, PLTEXT in the last 72 hours. Recent Labs      09/20/17   0325  09/19/17   0902  09/18/17   0524   NA  140  140  141   K  4.6  4.7  4.5   CL  96*  97  99   CO2  38*  36*  34*   BUN  70*  66*  58*   CREA  2.06*  2.16*  1.87*   GLU  117*  110*  117*   CA  9.1  9.2  8.8     No results for input(s): SGOT, GPT, ALT, AP, TBIL, TBILI, TP, ALB, GLOB, GGT, AML, LPSE in the last 72 hours. No lab exists for component: AMYP, HLPSE  No results for input(s): INR, PTP, APTT in the last 72 hours.     No lab exists for component: INREXT, INREXT   No results for input(s): FE, TIBC, PSAT, FERR in the last 72 hours. No results found for: FOL, RBCF   No results for input(s): PH, PCO2, PO2 in the last 72 hours. No results for input(s): CPK, CKNDX, TROIQ in the last 72 hours.     No lab exists for component: CPKMB  No results found for: CHOL, CHOLX, CHLST, CHOLV, HDL, LDL, LDLC, DLDLP, TGLX, TRIGL, TRIGP, CHHD, CHHDX  Lab Results   Component Value Date/Time    Glucose (POC) 124 09/15/2017 09:56 PM     No results found for: COLOR, APPRN, SPGRU, REFSG, KARISSA, PROTU, GLUCU, KETU, BILU, UROU, CONSUELO, LEUKU, GLUKE, EPSU, BACTU, WBCU, RBCU, CASTS, UCRY      Medications Reviewed:     Current Facility-Administered Medications   Medication Dose Route Frequency    acetylcysteine (MUCOMYST) 200 mg/mL (20 %) solution 200 mg  200 mg Nebulization TID RT    guaiFENesin ER (MUCINEX) tablet 600 mg  600 mg Oral Q12H    albuterol (PROVENTIL VENTOLIN) nebulizer solution 2.5 mg  2.5 mg Nebulization TID RT    isosorbide dinitrate (ISORDIL) tablet 10 mg  10 mg Oral TID    spironolactone (ALDACTONE) tablet 25 mg  25 mg Oral DAILY    predniSONE (DELTASONE) tablet 40 mg  40 mg Oral DAILY WITH BREAKFAST    budesonide (PULMICORT) 500 mcg/2 ml nebulizer suspension  500 mcg Nebulization BID RT    bumetanide (BUMEX) tablet 1 mg  1 mg Oral BID    albuterol-ipratropium (DUO-NEB) 2.5 MG-0.5 MG/3 ML  3 mL Nebulization Q4H PRN    fenofibrate nanocrystallized (TRICOR) tablet 48 mg  48 mg Oral DAILY    sertraline (ZOLOFT) tablet 100 mg  100 mg Oral DAILY    sodium chloride (NS) flush 5-10 mL  5-10 mL IntraVENous Q8H    sodium chloride (NS) flush 5-10 mL  5-10 mL IntraVENous PRN    acetaminophen (TYLENOL) tablet 650 mg  650 mg Oral Q4H PRN    heparin (porcine) injection 5,000 Units  5,000 Units SubCUTAneous Q8H    aspirin delayed-release tablet 81 mg  81 mg Oral DAILY    hydrALAZINE (APRESOLINE) tablet 25 mg  25 mg Oral TID    carvedilol (COREG) tablet 6.25 mg  6.25 mg Oral BID WITH MEALS    potassium chloride SR (KLOR-CON 10) tablet 20 mEq  20 mEq Oral BID     ______________________________________________________________________  EXPECTED LENGTH OF STAY: 4d 14h  ACTUAL LENGTH OF STAY:          Vlad Ledesma MD

## 2017-09-20 NOTE — PROGRESS NOTES
Pulmonary, Critical Care, and Sleep Medicine~Progress Note    Name: Smith Mcnamara MRN: [id number]   : 1955 Hospital: [address]   Date: 2017 8:29 AM Admission: 2017     IMPRESSION:   · Acute on chronic hypercapnic/hypoxic resp failure   · AE of COPD, unknown FEV1. Former smoker  · Obesity  · HFrEF, EF 25%  · Hx of recurrent ascites   · S/p ICD  · CKD      PLAN:   · Trilogy setup: AVAPS-AE mode: Target tidal volumes: 470ml; IPAPmax 80pmj1v, IPAPmin 12dio8q, EPAPmax 80shc8c, EPAPmin 6cmh2o, back up rate auto, I-time 1 sec, FiO2 > 90%, mask of choice  · CPAP and noninvasive bi-level pressure have been considered and ruled out for long term management for her hypercapnic resp failure as she has already failed with outpatient NIV. Patient reports using bi-level pressure support as outpatient basis. obstructive lung disease is suspected to be one of the major driving forces in her hypercapnia. · Recommend pulmicort/brovana nebs as outpatient basis  · O2 titration around 90%  · Diuresis  · Taper steroids   · Heparin   · duonebs  · Will need follow up in 3-4 wks with trilogy download and abg; she understood and verbally agreed to this  · Add a few doses of mucomyst today, again. IS, acapella, OOB as tolerated. Daily Progression:    congestion noted.      OBJECTIVE:     Vital Signs:       Visit Vitals    /72    Pulse 64    Temp 98 °F (36.7 °C)    Resp 20    Ht 5' 7\" (1.702 m)    Wt (!) 201.1 kg (443 lb 5.5 oz)    SpO2 91%    BMI 69.44 kg/m2      Temp (24hrs), Av.7 °F (36.5 °C), Min:97.5 °F (36.4 °C), Max:98 °F (36.7 °C)     Intake/Output:     Last shift:      Last 3 shifts:  1901 -  0700  In: -   Out: 200 [Urine:200]          Intake/Output Summary (Last 24 hours) at 17 0816  Last data filed at 17 1301   Gross per 24 hour   Intake                0 ml   Output              100 ml   Net -100 ml       Physical Exam:                                        Exam Findings Other   General: No resp distress noted, appears stated age    [de-identified]:  No ulcers, JVD not elevated, no cervical LAD    Chest: No pectus deformity, normal chest rise b/l    HEART:  RRR, no murmurs/rubs/gallops    Lungs:  diminished BS at bases; a little more coarse than yesterday    ABD: Soft/NT, non rigid mildly distended    EXT: No cyanosis/clubbing/edema, normal peripheral pulses    Skin: No rashes or ulcers, no mottling    Neuro:  A/O x 3        Medications:  Current Facility-Administered Medications   Medication Dose Route Frequency    albuterol-ipratropium (DUO-NEB) 2.5 MG-0.5 MG/3 ML  3 mL Nebulization QID RT    acetylcysteine (MUCOMYST) 200 mg/mL (20 %) solution 400 mg  2 mL Nebulization TID RT    isosorbide dinitrate (ISORDIL) tablet 10 mg  10 mg Oral TID    spironolactone (ALDACTONE) tablet 25 mg  25 mg Oral DAILY    predniSONE (DELTASONE) tablet 40 mg  40 mg Oral DAILY WITH BREAKFAST    budesonide (PULMICORT) 500 mcg/2 ml nebulizer suspension  500 mcg Nebulization BID RT    bumetanide (BUMEX) tablet 1 mg  1 mg Oral BID    albuterol-ipratropium (DUO-NEB) 2.5 MG-0.5 MG/3 ML  3 mL Nebulization Q4H PRN    fenofibrate nanocrystallized (TRICOR) tablet 48 mg  48 mg Oral DAILY    sertraline (ZOLOFT) tablet 100 mg  100 mg Oral DAILY    sodium chloride (NS) flush 5-10 mL  5-10 mL IntraVENous Q8H    sodium chloride (NS) flush 5-10 mL  5-10 mL IntraVENous PRN    acetaminophen (TYLENOL) tablet 650 mg  650 mg Oral Q4H PRN    heparin (porcine) injection 5,000 Units  5,000 Units SubCUTAneous Q8H    aspirin delayed-release tablet 81 mg  81 mg Oral DAILY    hydrALAZINE (APRESOLINE) tablet 25 mg  25 mg Oral TID    carvedilol (COREG) tablet 6.25 mg  6.25 mg Oral BID WITH MEALS    potassium chloride SR (KLOR-CON 10) tablet 20 mEq  20 mEq Oral BID       Labs:  ABG No results for input(s): PHI, PCO2I, PO2I, HCO3I, SO2I, FIO2I in the last 72 hours. CBC No results for input(s): WBC, HGB, HCT, PLT, MCV, MCH, HGBEXT, HCTEXT, PLTEXT, HGBEXT, HCTEXT, PLTEXT in the last 72 hours.      Metabolic  Panel Recent Labs      09/20/17   0325  09/19/17   0902  09/18/17   0524   NA  140  140  141   K  4.6  4.7  4.5   CL  96*  97  99   CO2  38*  36*  34*   GLU  117*  110*  117*   BUN  70*  66*  58*   CREA  2.06*  2.16*  1.87*   CA  9.1  9.2  8.8        Pertinent Labs                JORDY Benítez  9/20/2017

## 2017-09-20 NOTE — PROGRESS NOTES
Care Management: spoke with WinstonGarryreji from [address] [place] [address] ( [phone]) patient is accepted but needs insurance authorization ( obtained PT/OT notes from yesterday) and were sent to her. Authorization can take 24 hours. She will get back to us as soon as authorization is obtained. Patient updated with plan of care. Dr. Juan A Barraza aware. To follow transition of care. When discharged call [facility]. [phone]/ patient needs to take trilogy equipment on the ambulance and it will be set up at [address] [place] [address]. 1600: patient approved for [facility] / authorization approved. [address] to set up trilogy at [address] [place] [address]. Trilogy equipment to go on ambulance. [facility]  1800. Dr. Kaila Marcos accepting MD / [phone] and fax# [phone]. [facility] form/ EMTALA form/  / H&P/ PT / OT notes/ pulmonary etc faxed. Face sheet x2 ( 1 in envelope and 1 outside with [facility] form.     Malik Juarez, RN BSN CM

## 2017-09-20 NOTE — PROGRESS NOTES
0730: Bedside and Verbal shift change report given to Wayne Pearson RN (oncoming nurse) by Amarjit Hansen RN (offgoing nurse). Report included the following information SBAR, Kardex, Intake/Output, MAR, Accordion and Recent Results. 1030: Patient placed on trilogy mask at request to take nap as patient did not sleep well overnight. 1500: OT at bedside working with patient. 1530: Patient's insurance accepted [employer].  working to get patient transferred today. 1630: Transport will arrive to pick patient up at approximately 1800 tonight. 1750: I have reviewed discharge instructions with the patient. The patient verbalized understanding. Opportunity for questions provided and discharge instructions clarified as necessary. 1830: Transport arrived to take patient to [facility]. Discharge packet completed, EMTALA completed. Report called to Nelida Mathew RN at [employer].

## 2017-09-20 NOTE — PROGRESS NOTES
Problem: Mobility Impaired (Adult and Pediatric)  Goal: *Acute Goals and Plan of Care (Insert Text)  Physical Therapy Goals  Initiated 9/19/2017  1. Patient will move from supine to sit and sit to supine , scoot up and down and roll side to side in bed with modified independence within 7 day(s). 2. Patient will transfer from bed to chair and chair to bed with modified independence using the least restrictive device within 7 day(s). 3. Patient will perform sit to stand from wheelchair with modified independence within 7 day(s). 4. Patient will ambulate with modified independence for 20 feet with the least restrictive device within 7 day(s). PHYSICAL THERAPY TREATMENT  Patient: Erwin Monroe (67 y.o. female)  Date: 9/20/2017  Diagnosis: SOB (shortness of breath)  SOB (shortness of breath) SOB (shortness of breath)       Precautions: Fall      ASSESSMENT:  Cleared for mobilization by RN - reports pending discharge to [facility] this evening. Pt agreeable to participation with PT - requesting assist back to bed. She was able to complete sit<>stands from various surfaces with CGA and RW - increased time and effort noted. Able to take several steps from bed to chair with CGA for safety. Cues for PLB techniques and to avoid valsalva maneuver -note  face turns deep red-blue with mobility attempts. Requires prolonged duration to recover from minimal activity. SpO2 ranged 87-94% while on 6L. Assisted back to supine with mod A x2 for LE management (difficulty with self management 2* body habitus). Pt appears well below baseline in regards to cardiopulmonary tolerance to activity and would benefit from rehab prior to returning home alone.       Progression toward goals:  [ ]    Improving appropriately and progressing toward goals  [X]    Improving slowly and progressing toward goals  [ ]    Not making progress toward goals and plan of care will be adjusted       PLAN:  Patient continues to benefit from skilled intervention to address the above impairments. Continue treatment per established plan of care. Discharge Recommendations:  Rehab  Further Equipment Recommendations for Discharge:  TBD       SUBJECTIVE:   Patient stated I'm going to miss all of you.       OBJECTIVE DATA SUMMARY:   Critical Behavior:  Neurologic State: Alert  Orientation Level: Oriented X4  Cognition: Follows commands  Safety/Judgement: Good awareness of safety precautions  Functional Mobility Training:  Bed Mobility:     Sit to Supine: Moderate assistance;Assist x2     Transfers:  Sit to Stand: Contact guard assistance  Stand to Sit: Contact guard assistance     Balance:  Sitting: Intact  Standing: Impaired; With support  Standing - Static: Fair;Constant support  Standing - Dynamic : Fair  Ambulation/Gait Traininft from chair to EOB with RW and CGA                  Pain:  Pain Scale 1: Numeric (0 - 10)  Pain Intensity 1: 0   Activity Tolerance:   Currently on 6L, SpO2 ranges 87-94%, increased WOB and RR with minimal activity      Please refer to the flowsheet for vital signs taken during this treatment.   After treatment:   [ ]    Patient left in no apparent distress sitting up in chair  [X]    Patient left in no apparent distress in bed  [X]    Call bell left within reach  [X]    Nursing notified  [ ]    Caregiver present  [ ]    Bed alarm activated      COMMUNICATION/COLLABORATION:   The patients plan of care was discussed with: Registered Nurse     Jorge Delgado, PT, DPT   Time Calculation: 19 mins

## 2017-09-20 NOTE — PROGRESS NOTES
Problem: Self Care Deficits Care Plan (Adult)  Goal: *Acute Goals and Plan of Care (Insert Text)  Occupational Therapy Goals  Initiated 9/19/2017  1. Patient will complete dressing with supervision within 7 days. 2. Patient will complete toilet transfer with supervision within 7 days. 3. Patient will complete toileting within supervision within 7 days. 4. Patient will complete bathing with supervision within 7 days. OCCUPATIONAL THERAPY TREATMENT  Patient: Alejandro Darling (11 y.o. female)  Date: 9/20/2017  Diagnosis: SOB (shortness of breath)  SOB (shortness of breath) SOB (shortness of breath)       Precautions: Fall      ASSESSMENT:  Pt participated with lower body dressing form chair level and she was able to complete with  Min A using reacher. Pt did good with all tasks. Pt demonstrating improved activity tolerance with ADL tasks. Pt remained sitting up in chair following intervention. Recommend discharge to rehab setting to maximize independence and safety with all tasks. Progression toward goals:  [X]       Improving appropriately and progressing toward goals  [ ]       Improving slowly and progressing toward goals  [ ]       Not making progress toward goals and plan of care will be adjusted       PLAN:  Patient continues to benefit from skilled intervention to address the above impairments. Continue treatment per established plan of care. Discharge Recommendations:  Rehab  Further Equipment Recommendations for Discharge:  TBD       SUBJECTIVE:   Patient stated I am willing to try.       OBJECTIVE DATA SUMMARY:   Cognitive/Behavioral Status:  Neurologic State: Alert  Orientation Level: Oriented X4  Cognition: Follows commands  Perception: Appears intact  Perseveration: No perseveration noted  Safety/Judgement: Good awareness of safety precautions     Functional Mobility and Transfers for ADLs:  Bed Mobility:   received in chair and remained in chair     ADL Intervention:     Lower body dressing training with use of reacher to complete donning brief. Pt unable to pull brief up over hips as this brief would not fit her body habitus. Pt then doffed when finished. Lower Body Dressing Assistance  Dressing Assistance: Minimum assistance  Protective Undergarmet: Minimum assistance  Slip on Shoes Without Back: Minimum assistance  Leg Crossed Method Used: No  Position Performed: Seated in chair  Cues: Verbal cues provided  Adaptive Equipment Used: Reacher           Cognitive Retraining  Safety/Judgement: Good awareness of safety precautions     Pain:  Pain Scale 1: Numeric (0 - 10)  Pain Intensity 1: 0              Activity Tolerance:   VSS throughout session.    O2 saturations 92-94%     After treatment:   [X] Patient left in no apparent distress sitting up in chair  [ ] Patient left in no apparent distress in bed  [X] Call bell left within reach  [X] Nursing notified  [ ] Caregiver present  [ ] Bed alarm activated      COMMUNICATION/COLLABORATION:   The patients plan of care was discussed with: Physical Therapist and Registered Nurse     Bethanne Dance, OT  Time Calculation: 23 mins

## 2017-09-20 NOTE — DISCHARGE SUMMARY
Discharge Summary     Patient:  Dave Diaz       MRN: [id number]       YOB: 1955       Age: 58 y.o. Date of admission:  9/14/2017    Date of discharge:  9/20/2017    Primary care provider: Dr. Shantel Meneses MD    Admitting provider:  Veronica Quiroz MD    Discharging provider:  Gavino Bashir MD - [phone]  If unavailable, call [phone] and ask the  to page the triage hospitalist.    Consultations  · IP CONSULT TO CARDIOLOGY    Procedures  · * No surgery found *    Discharge destination: Rehab. The patient is stable for discharge. Admission diagnosis  · SOB (shortness of breath)  · SOB (shortness of breath)    Current Discharge Medication List      START taking these medications    Details   carvedilol (COREG) 6.25 mg tablet Take 1 Tab by mouth two (2) times daily (with meals). Qty: 60 Tab, Refills: 3      hydrALAZINE (APRESOLINE) 25 mg tablet Take 1 Tab by mouth three (3) times daily. Qty: 90 Tab, Refills: 3      isosorbide dinitrate (ISORDIL) 10 mg tablet Take 1 Tab by mouth three (3) times daily. Qty: 90 Tab, Refills: 3      spironolactone (ALDACTONE) 25 mg tablet Take 1 Tab by mouth daily. Qty: 30 Tab, Refills: 3      albuterol-ipratropium (DUO-NEB) 2.5 mg-0.5 mg/3 ml nebu 3 mL by Nebulization route every four (4) hours as needed. Qty: 30 Nebule, Refills: 2      budesonide (PULMICORT) 0.5 mg/2 mL nbsp 2 mL by Nebulization route two (2) times a day. Qty: 120 mL, Refills: 0      guaiFENesin ER (MUCINEX) 600 mg ER tablet Take 1 Tab by mouth every twelve (12) hours. Qty: 60 Tab, Refills: 0      potassium chloride SR (K-TAB) 20 mEq tablet Take 1 Tab by mouth two (2) times a day. Qty: 60 Tab, Refills: 1      predniSONE (DELTASONE) 10 mg tablet Take 4 tabs daily X 3 days, then Take 3 tabs X 3 days, then 2 tabs X 3 days, then 1 tab X 3 days.   Qty: 30 Tab, Refills: 0 CONTINUE these medications which have CHANGED    Details   bumetanide (BUMEX) 1 mg tablet Take 1 Tab by mouth two (2) times a day. Qty: 60 Tab, Refills: 3      sertraline (ZOLOFT) 100 mg tablet Take 1 Tab by mouth daily. Qty: 15 Tab, Refills: 0         CONTINUE these medications which have NOT CHANGED    Details   aspirin delayed-release 81 mg tablet Take 81 mg by mouth daily. fenofibrate nanocrystallized (TRICOR) 48 mg tablet Take 48 mg by mouth daily. STOP taking these medications       lisinopril (PRINIVIL, ZESTRIL) 2.5 mg tablet Comments:   Reason for Stopping:         metoprolol tartrate (LOPRESSOR) 25 mg tablet Comments:   Reason for Stopping:         Fenofibrate 40 mg tab Comments:   Reason for Stopping: Follow-up Information     Follow up With Details Comments Contact Info    Ede Johns MD On 12/14/2017 [facility] [address]  [address] [facility] [address] [facility] 59      Nathalia Beltran MD Schedule an appointment as soon as possible for a visit within 3-5 days following discharge from [facility]. [address]  [facility]      Clearance MD Liz In 1 week Discharge follow up  [address] [phone]            Final discharge diagnoses and brief hospital course  Please also refer to the admission H&P for details on the presenting problem.      59 yo female with PMhx of Chronic respiratory failure due to COPD on 3L O2 and CPAP HS/PRN, HTN, Chronic systolic CHF s/p AICD, admitted for ongoing SOB and wheezing for 3 days    Acute on Chronic Respiratory failure with Hypercapnia due to COPD Exacerbation  - needs to go home on Trilogy, seen by Pulmonary   - Trilogy at night and PRN during daytime  - c/w O2 3L during day time  - c/w Nebs, continue bravana/pulmicort at discharge  - steroids from IV to PO Prednisone taper  - V/Q scan: very low prob for PE     Acute Pulmonary Edema due to Acute on Chronic Systolic CHF NYHA IV on admission, unable to assess as patient on 3L NC during daytime,  - on Bumex changed to IV to PO BID   - c/w Coreg  - hold ACEI/ARBs due to renal dysfunction.   - I/O  - aldactone,  Imdur and Hydralazine added by cardio        Acute Kidney Insufficiency on CKD 3/4 due to diuresis   -  May have to accept high Cr to improve respiratory status   -  Stable Cr, likely has baseline CKD 3/4     S/p AICD  - Interrogation showed 73 episodes of VT terminated by ATP and MAT  - EP considering Mexiletine if recurs   - cardiology following  - ECHO noted  - EKG with LBBB, may need BIV ICD upgrade, can be done as outpt     Mildly elevated Trop  Due to CHF/Acute Respf ailure  - d/c trend      HTN  - c/w Coreg and Hydralazine      Morbidly Obese  - diet modification and wt loss recommended    FOLLOW-UP CARE RECOMMENDATIONS:    It is very important that you keep follow-up appointment(s). Bring discharge papers, medication list (and/or medication bottles) to follow-up appointments for review by outpatient provider(s). FOLLOW-UP TESTS RECOMMENDED:   · USE TRILOGY AT NIGHT AND PRN DURING DAYTIME    ONGOING TREATMENT PLAN: Rehab     PENDING TEST RESULTS:  At the time of discharge the following test results are still pending: none. Please review these results as they become available. Specific symptoms to watch for: chest pain, shortness of breath, fever, chills, nausea, vomiting, diarrhea, change in mentation, falling, weakness, bleeding.     DIET:  Cardiac Diet    ACTIVITY:  Activity as tolerated and PT/OT Eval and Treat    WOUND CARE: NONE    EQUIPMENT needed:  none    INCIDENTAL FINDINGS:  none    GOALS OF CARE:  X  Eventual return to home/independent/assisted living     Long term SNF      Hospice     No rehospitalization     Patient condition at discharge:   Functional status    Poor    X  Deconditioned      Independent   Cognition  X  Lucid     Forgetful (some sensescence)     Dementia   Catheters/lines (plus indication)    Workman     PICC      PEG         Code status  X  Full code      DNR        Physical examination at discharge  Visit Vitals    /89    Pulse 74    Temp 96.9 °F (36.1 °C)    Resp 22    Ht 5' 7\" (1.702 m)    Wt (!) 201.1 kg (443 lb 5.5 oz)    SpO2 95%    BMI 69.44 kg/m2     See progress note    Pertinent imaging studies:  ECHO:  Left ventricle: The ventricle was mildly dilated. Systolic function was moderately to markedly reduced. Ejection fraction was estimated to be 25 %. There was severe diffuse hypokinesis. Right ventricle: The ventricle was moderately dilated. Left atrium: The atrium was moderately to severely dilated. Tricuspid valve: There was mild regurgitation. There was mild to moderate pulmonary hypertension. No results for input(s): WBC, HGB, HCT, PLT, HGBEXT, HCTEXT, PLTEXT in the last 72 hours. Recent Labs      09/20/17   0325  09/19/17   0902  09/18/17   0524   NA  140  140  141   K  4.6  4.7  4.5   CL  96*  97  99   CO2  38*  36*  34*   BUN  70*  66*  58*   CREA  2.06*  2.16*  1.87*   GLU  117*  110*  117*   CA  9.1  9.2  8.8     No results for input(s): SGOT, GPT, AP, TBIL, TP, ALB, GLOB, GGT, AML, LPSE in the last 72 hours. No lab exists for component: AMYP, HLPSE  No results for input(s): INR, PTP, APTT in the last 72 hours. No lab exists for component: INREXT   No results for input(s): FE, TIBC, PSAT, FERR in the last 72 hours. No results for input(s): PH, PCO2, PO2 in the last 72 hours. No results for input(s): CPK, CKMB in the last 72 hours. No lab exists for component: TROPONINI  No components found for: Bc Point    Chronic Diagnoses:    Problem List as of 9/20/2017  Date Reviewed: 9/14/2017          Codes Class Noted - Resolved    * (Principal)SOB (shortness of breath) ICD-10-CM: R06.02  ICD-9-CM: 786.05  9/14/2017 - Present              Time spent on discharge related activities today greater than 30 minutes.       Signed:  Gavino Bashir MD                 Hospitalist, Internal Medicine      Cc:  Christiane Krause MD

## 2017-09-20 NOTE — DISCHARGE INSTRUCTIONS
Future Appointments  Date Time Provider Lizzette Jeffers   12/14/2017 8:40 AM Ede Johns  [address]   12/14/2017 8:45 AM Yecenia Amezquita, [address]       Discharging provider: Ralf Wing MD    Primary care provider: Andria Hill MD      FINAL [address] [facility] COURSE:    59 yo female with PMhx of Chronic respiratory failure due to COPD on 3L O2 and CPAP HS/PRN, HTN, Chronic systolic CHF s/p AICD, admitted for ongoing SOB and wheezing for 3 days    Acute on Chronic Respiratory failure with Hypercapnia due to COPD Exacerbation  - needs to go home on Trilogy, seen by Pulmonary   - Trilogy at night and PRN during daytime  - c/w O2 3L during day time  - c/w Nebs, continue bravana/pulmicort at discharge  - steroids from IV to PO Prednisone taper  - V/Q scan: very low prob for PE     Acute Pulmonary Edema due to Acute on Chronic Systolic CHF NYHA IV on admission  - on Bumex changed to IV to PO BID   - c/w Coreg  - hold ACEI/ARBs due to renal dysfunction.   - I/O  - aldactone,  Imdur and Hydralazine added by cardio        Acute Kidney Insufficiency on CKD 3/4 due to diuresis   -  May have to accept high Cr to improve respiratory status   -  Stable Cr, likely has baseline CKD 3/4     S/p AICD  - Interrogation showed 73 episodes of VT terminated by ATP and MAT  - EP considering Mexiletine if recurs   - cardiology following  - ECHO noted  - EKG with LBBB, may need BIV ICD upgrade, can be done as outpt     Mildly elevated Trop  Due to CHF/Acute Respf ailure  - d/c trend      HTN  - c/w Coreg and Hydralazine      Morbidly Obese  - diet modification and wt loss recommended    FOLLOW-UP CARE RECOMMENDATIONS:    APPOINTMENTS:  Follow-up Information     Follow up With Details Comments Contact Info    Ede Johns MD [facility] 12/14/2017 [facility] [address]  [address] [facility] [address]  [facility] 7 [address] [facility]      Nathalia Beltran MD Schedule an appointment as soon as possible for a visit within 3-5 days following discharge from [facility]. [address]  [facility]      Senaida Peabody, MD In 1 week Discharge follow up  [address] [phone]              It is very important that you keep follow-up appointment(s). Bring discharge papers, medication list (and/or medication bottles) to follow-up appointments for review by outpatient provider(s). FOLLOW-UP TESTS RECOMMENDED:   · USE TRILOGY AT NIGHT AND PRN DURING DAYTIME    ONGOING TREATMENT PLAN: Rehab     PENDING TEST RESULTS:  At the time of discharge the following test results are still pending: none. Please review these results as they become available. Specific symptoms to watch for: chest pain, shortness of breath, fever, chills, nausea, vomiting, diarrhea, change in mentation, falling, weakness, bleeding. DIET:  Cardiac Diet    ACTIVITY:  Activity as tolerated and PT/OT Eval and Treat    WOUND CARE: NONE    EQUIPMENT needed:  none    INCIDENTAL FINDINGS:  none    GOALS OF CARE:  X  Eventual return to home/independent/assisted living     Long term SNF      Hospice     No rehospitalization     Patient condition at discharge:   Functional status    Poor    X  Deconditioned      Independent   Cognition  X  Lucid     Forgetful (some sensescence)     Dementia   Catheters/lines (plus indication)    Workman     PICC      PEG         Code status  X  Full code      DNR    . . . . . . . . . . . . . . . . . . . . . . . . . . . . . . . . . . . . . . . . . . . . . . . . . . . . . . . . . . . . . . . . . . . . . . . Paco Petersen      CHRONIC MEDICAL CONDITIONS:  Problem List as of 9/20/2017  Date Reviewed: 9/14/2017          Codes Class Noted - Resolved    * (Principal)SOB (shortness of breath) ICD-10-CM: R06.02  ICD-9-CM: 786.05  9/14/2017 - Present

## 2017-09-20 NOTE — PROGRESS NOTES
Problem: Falls - Risk of  Goal: *Absence of Falls  Document Nas Fall Risk and appropriate interventions in the flowsheet.    Outcome: Progressing Towards Goal  Fall Risk Interventions:  Mobility Interventions: Patient to call before getting OOB, Communicate number of staff needed for ambulation/transfer, PT Consult for mobility concerns, PT Consult for assist device competence, Strengthening exercises (ROM-active/passive), Utilize walker, cane, or other assitive device           Medication Interventions: Patient to call before getting OOB, Teach patient to arise slowly     Elimination Interventions: Call light in reach, Patient to call for help with toileting needs, Toileting schedule/hourly rounds     History of Falls Interventions: Consult care management for discharge planning, Door open when patient unattended, Room close to nurse's station

## 2017-09-20 NOTE — PROGRESS NOTES
Cardiology Progress Note            Admit Date: 9/14/2017  Admit Diagnosis: SOB (shortness of breath)  SOB (shortness of breath)  Date: 9/20/2017     Time: 9:05 AM    Subjective:  Received laying in bed getting Jet Nebs. Feeling better with breathing today     Assessment and Plan     1: CHF, systolic   - NYHA class II   - EF 25%   - Coreg 6.25 mg BID   - No ACE/ARB 2/2 CKD   - Hydralazine 25 mg TID   - Isordil 10 mg PO TID   - Bumex 1 mg PO BID   - Aldactone 25 mg daily, watch K   - S[facility]p AICD at [facility]    2: Cardiomyopathy with LVEF 25%   - AICD in place - wide complex - Perhaps BiV in future   - GDT meds on board    3: ICD, ? arrhythmia, currently NSR   - Interrogation of ICD shows MAT    4: COPD.   - Pulmonary following   - Triloogy for home    5: ARF on CRF, presumably diuretics/volume contributory   - Seem like baseline 1.5-1.8  6. Body mass index is 69.44 kg/(m^2). Morbidly obese. Diet and exercise discussed    Coreg 6.25 mg BID, Hydralazine 25 mg TID, Isordil 10 mg TID, Bumex 1 mg BID, Aldactone 25 mg daily. Will continue these meds through discharge. Most likely today as D/w Dr. Salud Michele. Headed to  rehab. She will need to see Dr. Concetta Garcia in office 3-5 days after discharge from . Patient seen earlier today and examined  and agree with Advance Practice Provider (ENID, NP,PA)  assessment and plans  chronic venous changes  Controlled breathing but [place]  . Gia Manning MD 9/20/2017              ROS:  A comprehensive review of systems was negative except for that written in the HPI. Objective:      Physical Exam:                Visit Vitals    /89    Pulse 60    Temp 96.9 °F (36.1 °C)    Resp 21    Ht 5' 7\" (1.702 m)    Wt (!) 201.1 kg (443 lb 5.5 oz)    SpO2 92%    BMI 69.44 kg/m2        General Appearance:   Well developed, well nourished,alert and oriented x 3, and   individual in no acute distress. Ears/Nose/Mouth/Throat:    Hearing grossly normal.         Neck:  Supple. Chest:    Lungs light crackles but diminished to auscultation bilaterally. Cardiovascular:   Regular rate and rhythm, S1, S2 normal, no murmur. Abdomen:    Soft, non-tender, bowel sounds are active. Very large panus. Extremities:  mild edema bilaterally. Skin:  Warm and dry.  Venous stasis changes in feet ankles and shins     Telemetry: normal sinus rhythm          Data Review:    Labs:    Recent Results (from the past 24 hour(s))   METABOLIC PANEL, BASIC    Collection Time: 09/19/17  9:02 AM   Result Value Ref Range    Sodium 140 136 - 145 mmol/L    Potassium 4.7 3.5 - 5.1 mmol/L    Chloride 97 97 - 108 mmol/L    CO2 36 (H) 21 - 32 mmol/L    Anion gap 7 5 - 15 mmol/L    Glucose 110 (H) 65 - 100 mg/dL    BUN 66 (H) 6 - 20 MG/DL    Creatinine 2.16 (H) 0.55 - 1.02 MG/DL    BUN/Creatinine ratio 31 (H) 12 - 20      GFR est AA 28 (L) >60 ml/min/1.73m2    GFR est non-AA 23 (L) >60 ml/min/1.73m2    Calcium 9.2 8.5 - 85.2 MG/DL   METABOLIC PANEL, BASIC    Collection Time: 09/20/17  3:25 AM   Result Value Ref Range    Sodium 140 136 - 145 mmol/L    Potassium 4.6 3.5 - 5.1 mmol/L    Chloride 96 (L) 97 - 108 mmol/L    CO2 38 (H) 21 - 32 mmol/L    Anion gap 6 5 - 15 mmol/L    Glucose 117 (H) 65 - 100 mg/dL    BUN 70 (H) 6 - 20 MG/DL    Creatinine 2.06 (H) 0.55 - 1.02 MG/DL    BUN/Creatinine ratio 34 (H) 12 - 20      GFR est AA 30 (L) >60 ml/min/1.73m2    GFR est non-AA 24 (L) >60 ml/min/1.73m2    Calcium 9.1 8.5 - 10.1 MG/DL          Radiology:        Current Facility-Administered Medications   Medication Dose Route Frequency    acetylcysteine (MUCOMYST) 200 mg/mL (20 %) solution 200 mg  200 mg Nebulization TID RT    guaiFENesin ER (MUCINEX) tablet 600 mg  600 mg Oral Q12H    albuterol-ipratropium (DUO-NEB) 2.5 MG-0.5 MG/3 ML  3 mL Nebulization QID RT    isosorbide dinitrate (ISORDIL) tablet 10 mg  10 mg Oral TID    spironolactone (ALDACTONE) tablet 25 mg  25 mg Oral DAILY    predniSONE (DELTASONE) tablet 40 mg  40 mg Oral DAILY WITH BREAKFAST    budesonide (PULMICORT) 500 mcg/2 ml nebulizer suspension  500 mcg Nebulization BID RT    bumetanide (BUMEX) tablet 1 mg  1 mg Oral BID    albuterol-ipratropium (DUO-NEB) 2.5 MG-0.5 MG/3 ML  3 mL Nebulization Q4H PRN    fenofibrate nanocrystallized (TRICOR) tablet 48 mg  48 mg Oral DAILY    sertraline (ZOLOFT) tablet 100 mg  100 mg Oral DAILY    sodium chloride (NS) flush 5-10 mL  5-10 mL IntraVENous Q8H    sodium chloride (NS) flush 5-10 mL  5-10 mL IntraVENous PRN    acetaminophen (TYLENOL) tablet 650 mg  650 mg Oral Q4H PRN    heparin (porcine) injection 5,000 Units  5,000 Units SubCUTAneous Q8H    aspirin delayed-release tablet 81 mg  81 mg Oral DAILY    hydrALAZINE (APRESOLINE) tablet 25 mg  25 mg Oral TID    carvedilol (COREG) tablet 6.25 mg  6.25 mg Oral BID WITH MEALS    potassium chloride SR (KLOR-CON 10) tablet 20 mEq  20 mEq Oral BID          Wili Waterman PA-C     [facility] [address] [place] [address] 13, [address] [facility] [address]   [facility]   [phone]

## 2017-09-21 NOTE — PROGRESS NOTES
NN Note:    Patient admitted to [facility] 9/14-9/20 for SOB    Placed call to [facility] and spoke to Ms. Lynda Andrade nurse. Per her nurse, she is doing okay but still experiencing \"tender\" legs. Per her nurse she is being weighed daily. Faxed over discharge instructions to [phone]. Asked if I could be transferred to someone regarding discharge planning on the patient; per the nurse, the  was in a meeting. Gave the nurse my name and number to give to the  to be contacted of the patient's discharge so that I can schedule follow-up. Will continue to follow. Per Dr. Becca Aviles 9/20/17:  Assessment and Plan      1: CHF, systolic                        - NYHA class II                        - EF 25%                        - Coreg 6.25 mg BID                        - No ACE/ARB 2/2 CKD                        - Hydralazine 25 mg TID                        - Isordil 10 mg PO TID                        - Bumex 1 mg PO BID                        - Aldactone 25 mg daily, watch K                        - S/p AICD at [facility]     2: Cardiomyopathy with LVEF 25%                        - AICD in place - wide complex - Perhaps BiV in future                        - GDT meds on board     3: ICD, ? arrhythmia, currently NSR                        - Interrogation of ICD shows MAT     4: COPD.                        - Pulmonary following                        - Triloogy for home     5: ARF on CRF, presumably diuretics/volume contributory                        - Seem like baseline 1.5-1.8  6. Body mass index is 69.44 kg/(m^2). Morbidly obese. Diet and exercise discussed      Current Outpatient Prescriptions   Medication Sig    bumetanide (BUMEX) 1 mg tablet Take 1 Tab by mouth two (2) times a day.  carvedilol (COREG) 6.25 mg tablet Take 1 Tab by mouth two (2) times daily (with meals).  hydrALAZINE (APRESOLINE) 25 mg tablet Take 1 Tab by mouth three (3) times daily.     isosorbide dinitrate (ISORDIL) 10 mg tablet Take 1 Tab by mouth three (3) times daily.  spironolactone (ALDACTONE) 25 mg tablet Take 1 Tab by mouth daily.  albuterol-ipratropium (DUO-NEB) 2.5 mg-0.5 mg/3 ml nebu 3 mL by Nebulization route every four (4) hours as needed.  budesonide (PULMICORT) 0.5 mg/2 mL nbsp 2 mL by Nebulization route two (2) times a day.  guaiFENesin ER (MUCINEX) 600 mg ER tablet Take 1 Tab by mouth every twelve (12) hours.  potassium chloride SR (K-TAB) 20 mEq tablet Take 1 Tab by mouth two (2) times a day.  predniSONE (DELTASONE) 10 mg tablet Take 4 tabs daily X 3 days, then Take 3 tabs X 3 days, then 2 tabs X 3 days, then 1 tab X 3 days.  sertraline (ZOLOFT) 100 mg tablet Take 1 Tab by mouth daily.  aspirin delayed-release 81 mg tablet Take 81 mg by mouth daily.  fenofibrate nanocrystallized (TRICOR) 48 mg tablet Take 48 mg by mouth daily. No current facility-administered medications for this visit.

## 2017-10-14 PROBLEM — J96.00 ACUTE RESPIRATORY FAILURE (HCC): Status: ACTIVE | Noted: 2017-01-01

## 2017-10-14 PROBLEM — J96.20 ACUTE ON CHRONIC RESPIRATORY FAILURE (HCC): Status: ACTIVE | Noted: 2017-01-01

## 2017-10-14 NOTE — ED PROVIDER NOTES
HPI Comments: 58 y.o. female with past medical history significant for COPD, HTN, heart failure, and morbid obesity who presents from home via EMS with chief complaint of SOB. Pt states for the past week she has had progressively worsening SOB. Pt states she was recently admitted to [facility] for fluid overload and was discharged to a nursing home. Pt states she returned home this week and states for the past week she has not been taking her medications. Pt states she has had a slight cough and some lower back pain. Pt states for the past two days she has had urinary and fecal incontinence. Pt states she was receiving Lovenox injections in her R-abdomen and notes for the past couple days she has had weeping over that area of skin. Per EMS, Pt received 324 mg ASA en route. Pt denies having fever, abdominal pain, or any pain. There are no other acute medical concerns at this time. Old Chart Review: Pt was recently admitted to [facility] from 9/14/17 to 9/20/17 for acute on chronic respiratory failure with hypercapnia due to COPD exacerbation. Pt also had acute pulmonary edema due to acute on chronic systolic CHF NYHA IV. Pt was started on Bumex and told to continue Coreg. Pt was discharged to home on Trilogy and plan was to continue with O2 3L during day time. PCP: Carroll Mosquera MD    Note written by Giovani Erickson, as dictated by Duy Terry MD 2:55 PM    The history is provided by the patient. Past Medical History:   Diagnosis Date    Chronic obstructive pulmonary disease ([address]     Heart failure ([address]     Hypertension     Obesity        Past Surgical History:   Procedure Laterality Date    HX PACEMAKER           History reviewed. No pertinent family history. Social History     Social History    Marital status: LEGALLY      Spouse name: N/A    Number of children: N/A    Years of education: N/A     Occupational History    Not on file.      Social History Main Topics    Smoking status: Former Smoker    Smokeless tobacco: Never Used    Alcohol use No    Drug use: Not on file    Sexual activity: Not on file     Other Topics Concern    Not on file     Social History Narrative         ALLERGIES: Review of patient's allergies indicates no known allergies. Review of Systems   Constitutional: Negative for activity change, appetite change, fatigue and fever. HENT: Negative for ear pain, facial swelling, sore throat and trouble swallowing. Eyes: Negative for pain, discharge and visual disturbance. Respiratory: Positive for shortness of breath. Negative for chest tightness and wheezing. Cardiovascular: Negative for chest pain and palpitations. Gastrointestinal: Negative for abdominal pain, blood in stool, nausea and vomiting. Positive for urinary and fecal incontinence. Genitourinary: Negative for difficulty urinating, flank pain and hematuria. Musculoskeletal: Positive for back pain. Negative for arthralgias, joint swelling, myalgias and neck pain. Skin: Positive for color change. Negative for rash. Neurological: Negative for dizziness, weakness, numbness and headaches. Hematological: Negative for adenopathy. Does not bruise/bleed easily. Psychiatric/Behavioral: Negative for behavioral problems, confusion and sleep disturbance. All other systems reviewed and are negative. Vitals:    10/14/17 1500   BP: 123/71   Pulse: 85   Resp: 18   Temp: 98.2 °F (36.8 °C)   SpO2: 100%   Height: 5' 7\" (1.702 m)            Physical Exam   Constitutional: She is oriented to person, place, and time. She appears distressed. Morbidly obese. Poorly kempt and smells strongly of urine. HENT:   Head: Normocephalic and atraumatic. Nose: Nose normal.   Mouth/Throat: Oropharynx is clear and moist.   Eyes: Conjunctivae and EOM are normal. Pupils are equal, round, and reactive to light. No scleral icterus. Neck: Normal range of motion. Neck supple.  No JVD present. No tracheal deviation present. No thyromegaly present. No carotid bruits noted. Cardiovascular: Normal rate, regular rhythm, normal heart sounds and intact distal pulses. Exam reveals no gallop and no friction rub. No murmur heard. AICD in place. Pulmonary/Chest: She is in respiratory distress (mild to moderate). She has no wheezes. She has rhonchi (few bilaterally). She has no rales. She exhibits no tenderness. Speaking in full sentences. On BiPAP and tolerating well. Unable to fully evaluate breath sounds secondary to body habitus. Abdominal: Soft. Bowel sounds are normal. She exhibits distension (massive). She exhibits no mass. There is no tenderness. There is no rebound and no guarding. Musculoskeletal: Normal range of motion. She exhibits no edema or tenderness. Lymphadenopathy:     She has no cervical adenopathy. Neurological: She is alert and oriented to person, place, and time. She has normal reflexes. No cranial nerve deficit. Coordination normal.   Skin: Skin is warm and dry. Skin appears dry and red. Edematous extremities. Psychiatric: She has a normal mood and affect. Her behavior is normal. Judgment and thought content normal.   Nursing note and vitals reviewed. Note written by Nikita Vallejo.  Dianakenneth Dill, as dictated by Claudine Peoples MD 2:55 PM       MDM  Number of Diagnoses or Management Options     Amount and/or Complexity of Data Reviewed  Clinical lab tests: reviewed and ordered  Tests in the radiology section of CPT®: ordered and reviewed  Decide to obtain previous medical records or to obtain history from someone other than the patient: yes  Review and summarize past medical records: yes  Discuss the patient with other providers: yes  Independent visualization of images, tracings, or specimens: yes    Risk of Complications, Morbidity, and/or Mortality  Presenting problems: high  Diagnostic procedures: high  Management options: high    Patient Progress  Patient progress: stable    ED Course       Procedures    ED EKG interpretation:  Rhythm: Occasional paced beat; Rate (approx.): 78 bpm; Axis: left axis deviation; ST/T wave: non-specific changes; LBBB  Note written by Diana Estevez, as dictated by Alma Jones MD 3:52 PM    CONSULT NOTE:  3:52 PM Alma Jones MD spoke with Dr. Faby Lawrence, Consult for Hospitalist.  Discussed available diagnostic tests and clinical findings. He is in agreement with care plans as outlined. He will see Pt and evaluate for admission.

## 2017-10-14 NOTE — ROUTINE PROCESS
TRANSFER - OUT REPORT:    Verbal report given to MACARENA Gonzalez(name) on Dorrine Simmonds  being transferred to [facility]) for routine progression of care       Report consisted of patients Situation, Background, Assessment and   Recommendations(SBAR). Information from the following report(s) SBAR, ED Summary, [facility] and Recent Results was reviewed with the receiving nurse. Lines:   Peripheral IV 10/14/17 Left Hand (Active)   Site Assessment Clean, dry, & intact 10/14/2017  3:08 PM   Phlebitis Assessment 0 10/14/2017  3:08 PM   Infiltration Assessment 0 10/14/2017  3:08 PM   Dressing Status Clean, dry, & intact 10/14/2017  3:08 PM   Dressing Type Transparent 10/14/2017  3:08 PM   Hub Color/Line Status Pink;Flushed;Patent 10/14/2017  3:08 PM   Action Taken Catheter retaped;Blood drawn 10/14/2017  3:08 PM        Opportunity for questions and clarification was provided.       Patient transported with:   Registered Nurse

## 2017-10-14 NOTE — PROGRESS NOTES
Admission Medication Reconciliation:    Information obtained from: Chart    Significant PMH/Disease States:   Past Medical History:   Diagnosis Date    Chronic obstructive pulmonary disease [facility]     Heart failure ([facility]     Hypertension     Obesity        Chief Complaint for this Admission:  Worsening dyspnea    Allergies:  Review of patient's allergies indicates no known allergies. Prior to Admission Medications:   Prior to Admission Medications   Prescriptions Last Dose Informant Patient Reported? Taking? albuterol-ipratropium (DUO-NEB) 2.5 mg-0.5 mg/3 ml nebu 10/14/2017 at Unknown time  No Yes   Sig: 3 mL by Nebulization route every four (4) hours as needed. aspirin delayed-release 81 mg tablet 10/12/2017  Yes Yes   Sig: Take 81 mg by mouth daily. budesonide (PULMICORT) 0.5 mg/2 mL nbsp   No Yes   Si mL by Nebulization route two (2) times a day. bumetanide (BUMEX) 1 mg tablet 10/12/2017  No Yes   Sig: Take 1 Tab by mouth two (2) times a day. carvedilol (COREG) 6.25 mg tablet 10/12/2017  No Yes   Sig: Take 1 Tab by mouth two (2) times daily (with meals). fenofibrate nanocrystallized (TRICOR) 48 mg tablet 10/12/2017  Yes Yes   Sig: Take 48 mg by mouth daily. guaiFENesin ER (MUCINEX) 600 mg ER tablet   No Yes   Sig: Take 1 Tab by mouth every twelve (12) hours. hydrALAZINE (APRESOLINE) 25 mg tablet   No Yes   Sig: Take 1 Tab by mouth three (3) times daily. isosorbide dinitrate (ISORDIL) 10 mg tablet   No Yes   Sig: Take 1 Tab by mouth three (3) times daily. potassium chloride SR (K-TAB) 20 mEq tablet   No Yes   Sig: Take 1 Tab by mouth two (2) times a day. predniSONE (DELTASONE) 10 mg tablet Unknown at Unknown time  No No   Sig: Take 4 tabs daily X 3 days, then Take 3 tabs X 3 days, then 2 tabs X 3 days, then 1 tab X 3 days. sertraline (ZOLOFT) 100 mg tablet 10/12/2017  No Yes   Sig: Take 1 Tab by mouth daily.    spironolactone (ALDACTONE) 25 mg tablet 10/12/2017  No Yes   Sig: Take 1 Tab by mouth daily. Facility-Administered Medications: None         Comments/Recommendations: Per EMS patient has been non compliant with medications for at least two days. Chart review note 9/21/17 (cardiology) lists the medications intended for this patient. This list reflects that, unknown when last prednisone was administered. Allergies reviewed.     Keyana BarkerD, RN #[id number]

## 2017-10-14 NOTE — PROGRESS NOTES
TRANSFER - IN REPORT:    Verbal report received from Tae (name) on Alannah Ballard  being received from ED (unit) for routine progression of care      Report consisted of patients Situation, Background, Assessment and   Recommendations(SBAR). Information from the following report(s) SBAR, Kardex, ED Summary, Intake/Output, MAR, Recent Results and Cardiac Rhythm Paced was reviewed with the receiving nurse. Opportunity for questions and clarification was provided. Assessment completed upon patients arrival to unit and care assumed.        Primary Nurse Grace Rooney and Tc Farah RN performed a dual skin assessment on this patient Impairment noted- see wound doc flow sheet, Excoriation in left groin, thick orange-peel skin, to abdomen and BLE  Cam Score is as charted

## 2017-10-14 NOTE — ED TRIAGE NOTES
TRIAGE NOTE: Patient arrives via EMS from home for increased SOB over last week. Patient arrives to ED on CPAP with ST elevation in lateral and inferior leads per EMS. Denies CP. Given 324 aspirin and nebs given en route. Noncompliance with medications for 2 days per EMS.

## 2017-10-14 NOTE — PROGRESS NOTES
Day #1 of Levaquin  Indication:  COPB exacerbation  Current regimen:  500 mg q48h  Recent Labs      10/14/17   1509   WBC  6.8   CREA  1.61*   BUN  47*     Est CrCl: >50 ml/min   Temp (24hrs), Av.9 °F (36.6 °C), Min:97.7 °F (36.5 °C), Max:98.2 °F (36.8 °C)    Plan: Change to 500 mg q24h per protocol

## 2017-10-15 NOTE — PROGRESS NOTES
Hospitalist Progress Note  Abram Cooks, MD  Answering service: [phone] from in house phone      Date of Service:  10/15/2017  NAME:  Ashlee Rico  :  1955  MRN:  [id number]      Admission Summary: This is a 70-year-old    female with past medical history significant for chronic   respiratory failure, home oxygen dependent 4 liters per minute via   nasal cannula, COPD, morbid obesity, she uses Trilogy at home,   hypertension, chronic systolic congestive heart failure, status post   AICD, chronic kidney disease, who presented to [facility] Emergency   Department with progressive shortness of breath since yesterday. She   stated that she has been using her breathing treatment. Despite that,   her shortness of breath progressively worsened and decided to come   to [facility] Emergency Department. Chest x-ray was done and   showed stable moderately severe cardiomegaly and mild interstitial   edema pattern. The patient received nebulizer treatment, IV   ceftriaxone, Workman catheter placed, and patient transferred to the   hospitalist service for further evaluation and admission. Interval history / Subjective: Morbidly obese lady admitted with acute respiratory failure ,on BIPAP now,saying that sob improving compared to yesterday. Assessment & Plan:     1. Acute on chronic hypercapnic respiratory failure likely due to obesity   hypoventilation syndrome, acute COPD and systolic congestive heart failure exacerbation.  -She is on BIPAP,O2sat 99%  -Will wean off with goal of nasal cannula oxygen 4 liters which is patient's baseline at home  -ABGs today slightly worse than yesterday. Will continue to monitor. Clinically patient improving compared to yesterday and she is feeling better.  -Continue treatment for copd,chf exacerbation.       2. Acute on chronic systolic congestive heart failure, [place] Heart   Association class II, status post AICD. - Chest x-ray shows mild and moderately severe cardiomyopathy  - ECHO c/w EF 25% with severe hypokinesis on 9/14/2017  - On Bumex to IV 1 mg b.i.d. KCl,   - Continue Coreg, hydralazine, isosorbide dinitrate and spironolactone. The patient not on ACE inhibitor or ARB due to CKD. -  Monitor intake and output, daily weights, oxygen support and continue cardiac monitoring. 3. COPD with acute exacerbation.   -  On IV steroid 40 mg IV q.8, Pulmicort nebulizer treatment, DuoNeb neb   treatment, oxygen and BiPAP support and monitor pulse oximetry   monitoring. 4. Chronic kidney disease stage III. -  Creatinine is stable. Monitor renal   function test.     5. Hypokalemia:K+ 2.9 today  -   Replete,then follow lab in am    6. Hypertension.   -  Continue home medication, Coreg, hydralazine,   Isosorbide dinitrate, spironolactone and monitor blood pressure. 7. History of depression, stable. Continue home medication Zoloft. 8. Super morbid obesity  -  diet and weight loss program education      9. Legs ulcers,stasis  Wound care eval      Code status:full code  DVT prophylaxis:sc heparin    Care Plan discussed with:patient and nurse  Disposition:D     Hospital Problems  Date Reviewed: 10/14/2017          Codes Class Noted POA    Acute respiratory failure ([facility] ICD-10-CM: J96.00  ICD-9-CM: 518.81  10/14/2017 Unknown        * (Principal)Acute on chronic respiratory failure [facility]) ICD-10-CM: J96.20  ICD-9-CM: 518.84  10/14/2017 Unknown                Review of Systems:   Says that she is feeling very tired this am,otherwise her breathing is getting better. Vital Signs:    Last 24hrs VS reviewed since prior progress note.  Most recent are:  Visit Vitals    /62 (BP 1 Location: Left arm, BP Patient Position: At rest)    Pulse 87    Temp 98.5 °F (36.9 °C)    Resp 24    Ht 5' 7\" (1.702 m)    Wt (!) 187 kg (412 lb 4.8 oz)    SpO2 96%    BMI 64.58 kg/m2       No intake or output data in the 24 hours ending 10/15/17 1025     Physical Examination:        Constitutional:  No acute distress, cooperative, bipap in place   ENT:  Oral mucous moist, oropharynx benign. Neck supple,    Resp:  Decreased breath sounds,difficult to appreciate if wheezing or crackles,poor respiratory effort. CV:  Regular rhythm, normal rate, no murmurs, gallops, rubs    GI:  Soft, non distended, non tender. normoactive bowel sounds, no hepatosplenomegaly     Musculoskeletal:  venous statis dermatitis with ulcers both legs,dressings in place. No edema. Pedis pulses present. Neurologic:  Moves all extremities. Data Review:    Review and/or order of clinical lab test      Labs:     Recent Labs      10/15/17   0353  10/14/17   1509   WBC  6.2  6.8   HGB  11.9  12.5   HCT  38.1  39.6   PLT  143*  169     Recent Labs      10/15/17   0353  10/14/17   1509   NA  140  141   K  2.9*  3.9   CL  92*  91*   CO2  38*  44*   BUN  50*  47*   CREA  1.64*  1.61*   GLU  125*  95   CA  8.9  9.1     Recent Labs      10/15/17   0353  10/14/17   1509   SGOT  19  37   ALT  29  34   AP  32*  32*   TBILI  3.7*  4.6*   TP  6.4  6.9   ALB  3.0*  3.2*   GLOB  3.4  3.7     No results for input(s): INR, PTP, APTT in the last 72 hours. No lab exists for component: INREXT   No results for input(s): FE, TIBC, PSAT, FERR in the last 72 hours. No results found for: FOL, RBCF   No results for input(s): PH, PCO2, PO2 in the last 72 hours.   Recent Labs      10/14/17   1509   TROIQ  0.07*     No results found for: CHOL, CHOLX, CHLST, CHOLV, HDL, LDL, LDLC, DLDLP, TGLX, TRIGL, TRIGP, CHHD, CHHDX  Lab Results   Component Value Date/Time    Glucose (POC) 124 09/15/2017 09:56 PM     No results found for: COLOR, APPRN, SPGRU, REFSG, KARISSA, PROTU, GLUCU, KETU, BILU, UROU, CONSUELO, LEUKU, GLUKE, EPSU, BACTU, WBCU, RBCU, CASTS, UCRY      Medications Reviewed:     Current Facility-Administered Medications   Medication Dose Route Frequency    albuterol-ipratropium (DUO-NEB) 2.5 MG-0.5 MG/3 ML  3 mL Nebulization Q4H PRN    aspirin delayed-release tablet 81 mg  81 mg Oral DAILY    budesonide (PULMICORT) 500 mcg/2 ml nebulizer suspension  500 mcg Nebulization BID RT    carvedilol (COREG) tablet 6.25 mg  6.25 mg Oral BID WITH MEALS    fenofibrate nanocrystallized (TRICOR) tablet 48 mg  48 mg Oral DAILY    hydrALAZINE (APRESOLINE) tablet 25 mg  25 mg Oral TID    isosorbide dinitrate (ISORDIL) tablet 10 mg  10 mg Oral TID    potassium chloride SR (KLOR-CON 10) tablet 20 mEq  20 mEq Oral BID    sertraline (ZOLOFT) tablet 100 mg  100 mg Oral DAILY    spironolactone (ALDACTONE) tablet 25 mg  25 mg Oral DAILY    methylPREDNISolone (PF) (SOLU-MEDROL) injection 40 mg  40 mg IntraVENous Q8H    bumetanide (BUMEX) injection 1 mg  1 mg IntraVENous BID    sodium chloride (NS) flush 5-10 mL  5-10 mL IntraVENous Q8H    sodium chloride (NS) flush 5-10 mL  5-10 mL IntraVENous PRN    ondansetron (ZOFRAN) injection 4 mg  4 mg IntraVENous Q4H PRN    heparin (porcine) injection 5,000 Units  5,000 Units SubCUTAneous Q8H    senna-docusate (PERICOLACE) 8.6-50 mg per tablet 1 Tab  1 Tab Oral QHS    polyethylene glycol (MIRALAX) packet 17 g  17 g Oral DAILY    levoFLOXacin (LEVAQUIN) 500 mg in D5W IVPB  500 mg IntraVENous Q24H     ______________________________________________________________________  EXPECTED LENGTH OF STAY: - - -  ACTUAL LENGTH OF STAY:          1                 Navya Longoria MD

## 2017-10-15 NOTE — H&P
[address]   [address] [place] [address], [address]   HISTORY AND PHYSICAL       Name:  Moon Cordova   MR#:  [id number]   :  1955   Account #:  [de-identified]        Date of Adm:  10/14/2017       PRIMARY CARE PROVIDER: Queta Ga. Jordin Martini MD    SOURCE OF INFORMATION: Patient. CHIEF COMPLAINT: Progressive shortness of breath since yesterday. HISTORY OF PRESENTING ILLNESS: This is a 54-year-old    female with past medical history significant for chronic   respiratory failure, home oxygen dependent 4 liters per minute via   nasal cannula, COPD, morbid obesity, she uses Trilogy at home,   hypertension, chronic systolic congestive heart failure, status post   AICD, chronic kidney disease, who presented to [facility] Emergency   Department with progressive shortness of breath since yesterday. She   stated that she has been using her breathing treatment. Despite that,   her shortness of breath progressively worsened and decided to come   to [facility] Emergency Department. No fever, chills, sweating, nausea   or vomiting, abdominal pain, abnormal bowel movement, or urinary   complaints. She feels chest tightness but no left side chest pain,   wheezing, or diaphoresis. She has lower extremity swelling. In ER her   vital signs, blood pressure was 123/71, temperature 98.2, pulse 85,   saturation of oxygen 100% on BiPAP. Chest x-ray was done and   showed stable moderately severe cardiomegaly and mild interstitial   edema pattern. The patient received nebulizer treatment, IV   ceftriaxone, Workman catheter placed, and patient transferred to the   hospitalist service for further evaluation and admission. REVIEW OF SYSTEMS: Pertinent positive findings mentioned in HPI. All systems reviewed, not any other positive finding. PAST MEDICAL HISTORY   1. Chronic COPD. 2. Chronic respiratory failure on home oxygen 4 liters via nasal   cannula. 3. Hypertension.    4. Chronic systolic congestive heart failure, New York Heart   Association class II, status post AICD. 5. Super morbid obesity, on Trilogy at home. PRIOR TO ADMISSION MEDICATIONS   Include    1. Bumex 1 mg p.o. b.i.d.   2. Coreg 6.25 mg p.o. b.i.d.   3. Hydralazine 25 mg p.o. 3 times daily. 4. Isosorbide dinitrate 1 tab 10 mg p.o. 3 times daily. 5. Spironolactone 25 mg p.o. daily. 6. DuoNeb neb treatments every 4 hours as needed. 7. Pulmicort nebulizer every 12 hours. 8. Potassium chloride 2 tabs 20 mEq p.o. b.i.d.   9. Zoloft 100 mg p.o. daily. 10. Aspirin 81 mg p.o. daily. 11. Tricor 48 mg p.o. daily. ALLERGIES: NO KNOWN DRUG ALLERGIES. SOCIAL HISTORY: She states she lives by herself. She uses walker   and wheelchair. She quit smoking at the age of 28. No alcohol abuse. She is a FULL CODE. Advanced care planning discussed with the   patient. She does not have advanced directive document but she   wants to be a FULL CODE. FAMILY HISTORY: The patient denied any family history of congestive   heart failure, heart attack or diabetes mellitus. PHYSICAL EXAMINATION   VITAL SIGNS: Blood pressure 123/71, pulse 85, temperature 98.2,   respiratory rate 18, saturation of oxygen 100% on BiPAP. GENERAL APPEARANCE: The patient is in moderate respiratory   distress on BiPAP, morbidly obese. HEENT: Pink conjunctivae. Anicteric sclerae. Moist tongue and buccal   mucosa. LUNGS: Decreased bronchial breath sounds to auscultation bilaterally. CHEST WALL: No tenderness or deformity. CARDIOVASCULAR SYSTEM: Regular rhythm and normal rate. S1   and S2 are heard. No murmur or gallop. ABDOMEN: Morbidly obese, soft, nontender, no palpated mass or   organomegaly. EXTREMITIES: She has bilateral pretibial and pedal edema. SKIN: No rash. CENTRAL NERVOUS SYSTEM: The patient is conscious, well   oriented to time, place, and person. Motor upper extremity 5/5, lower   extremity 4/5. Sensation intact.  Cranial nerves 2-12 grossly intact. EKG: Atrial sensed, ventricular paced rhythm. Ventricular rate 78 beats   per minute, nonspecific ST-T wave abnormalities. LABORATORY: White blood cell count 6.8, hemoglobin 12.5,   hematocrit 39.6, MCV 95.2, platelet count 626. Chemistry: Sodium   141, potassium 3.9, chloride 91, CO2 44, anion gap 6, glucose 95,   BUN 47, creatinine 1.61, BUN/creatinine ratio 29, calcium 9.1, GFR 32,   total protein 6.9, albumin 3.2, globulin 3.7, ALT 34, AST 37, alkaline   phosphatase 32. CK 83, troponin 0.07. CHEST X-RAY: Stable moderate severe cardiomegaly and mild   interstitial edema pattern. ASSESSMENT AND PLAN: This is a 58-year-old  female   with past medical history significant for chronic respiratory failure,   home oxygen-dependent at 4 liters per minute via nasal cannula,   chronic obstructive pulmonary disease, super morbid obesity on   Trilogy at home, hypertension, chronic systolic congestive heart failure,   status post AICD, chronic kidney disease and depression, presented to   [facility] Emergency Department with progressive shortness of breath   since yesterday. 1. Acute on chronic hypercapnic respiratory failure possibly due to   obesity hypoventilation syndrome, chronic obstructive pulmonary   Disease exacerbation and acute on chronic congestive heart failure. 2. Acute on chronic systolic congestive heart failure. 3. Chronic obstructive pulmonary disease with possible acute   exacerbation. 4. Chronic kidney disease stage III. 5. Hypertension. 6. History of depression. 7. Super morbid obesity. PLAN   1. Acute on chronic hypercapnic respiratory failure likely due to obesity   hypoventilation syndrome, acute COPD and systolic congestive heart failure   exacerbation. Admit the patient intermediate care unit. will start her   on Solu-Medrol IV 40 mg q.8., DuoNeb nebulizer treatment, Pulmicort,   continue BiPAP support,pulse oximetry monitoring and   repeat ABG in a.m. 2. Acute on chronic systolic congestive heart failure, New York Heart   Association class II, status post AICD. Chest x-ray shows mild   interstitial edema. Will switch her Bumex to IV 1 mg b.i.d. KCl,   continue home medication, Coreg, hydralazine, isosorbide dinitrate   and spironolactone. The patient not on ACE inhibitor or ARB due to   chronic kidney disease. Monitor intake and output, daily weights,   oxygen support and continue cardiac monitoring. 3. COPD with acute exacerbation. Will start her on IV steroid   40 mg IV q.8, Pulmicort nebulizer treatment, DuoNeb neb   treatment, oxygen and BiPAP support and monitor pulse oximetry   monitoring. 4. Chronic kidney disease stage III. Creatinine is stable. Monitor renal   function test.   5. Hypertension. Continue home medication, Coreg, hydralazine,   Isosorbide dinitrate, spironolactone and monitor blood pressure. 6. History of depression, stable. Continue home medication Zoloft. 7. Super morbid obesity and diet and weight loss program.     DVT prophylaxis, heparin.         MD MARISSA Carrasco / RICHARD   D:  10/14/2017   16:42   T:  10/14/2017   23:39   Job #:  [id number]

## 2017-10-15 NOTE — PROGRESS NOTES
0745  Bedside and Verbal shift change report given to Wolf Fenton RN (oncoming nurse) by Andrea Joseph RN (offgoing nurse). Report included the following information SBAR, Kardex, ED Summary, Procedure Summary, Intake/Output, MAR, Recent Results and Cardiac Rhythm paced.
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
## 2017-10-16 NOTE — PROGRESS NOTES
Bedside and Verbal shift change report given to [facility] and Laura Womack RN (oncoming nurse) by Chantale Fletcher RN (offgoing nurse). Report included the following information SBAR, Kardex, ED Summary, Procedure Summary, Intake/Output, MAR, Recent Results and Cardiac Rhythm Vpaced. Hourly rounding performed.

## 2017-10-16 NOTE — PROGRESS NOTES
Bedside shift change report given to Andrea Joseph RN (oncoming nurse) by Elvia Goldberg RN (offgoing nurse). Report included the following information SBAR, Kardex, Intake/Output, MAR, Recent Results, Med Rec Status and Cardiac Rhythm pacedQ1 hour rounds conducted throughout shift. Opportunity for questions provided.

## 2017-10-16 NOTE — NURSE NAVIGATOR
Chart reviewed by Heart Failure Nurse Navigator. Heart Failure database completed. EF 25%. ACEi/ARB: Contraindicated CKD. BB: Coreg 6.25 mg. CRT AICD  NYHA Functional Class II. Heart Failure Teach Back in Patient Education. Heart Failure Avoiding Triggers on Discharge Instructions. Outpatient nurse navigators notified of admission. Readmission - Patient was discharged to [facility] 9/20/17.   She had an appointment on 10/10/17 with Dr. Roland Isaac but  [address] [place] [address] records indicate it as  \"No show\"

## 2017-10-16 NOTE — PROGRESS NOTES
Problem: Self Care Deficits Care Plan (Adult)  Goal: *Acute Goals and Plan of Care (Insert Text)  Occupational Therapy Goals  Initiated 10/16/2017  1. Patient will perform grooming with modified independence within 7 day(s). 2. Patient will perform upper body dressing with supervision/set-up within 7 day(s). 3. Patient will perform lower body dressing with moderate assistance within 7 day(s). 4. Patient will perform toilet transfers with maximal assistance within 7 day(s). 5. Patient will perform all aspects of toileting with maximal assistance within 7 day(s). 6. Patient will participate in upper extremity therapeutic exercise/activities with minimal assistance/contact guard assist for 7 minutes within 7 day(s). 7. Patient will utilize energy conservation, fall prevention, PLB techniques during functional activities with verbal, visual and tactile cues within 7 day(s). OCCUPATIONAL THERAPY EVALUATION  Patient: Cristian Villasenor (76 y.o. female)  Date: 10/16/2017  Primary Diagnosis: Acute respiratory failure (HCC)  Acute on chronic respiratory failure (HCC)        Precautions:   Fall, Skin (B LE Lymphedema, 4L O2 PTA; Trilogy PTA EF 25%)      ASSESSMENT :  Based on the objective data described below, the patient presents with general debility post discharge from [facility] ~ 1 week ago where she had participated in pulmonary rehab x ~ 10 days. She reports rapid decline once home, possible due to what she perceives as \"backache/UTI\" with unusual urinary incontinence and poor appetite. Admitted for acute on chronic respiratory failure. She lives alone overall (ex-spouse cohabits but does not assist in her care.) She noted her B LEs weeping and [facility] on [facility]13 but her call x 2 was not returned on Saturday so she came to ER. Patient currently mod-min A UE ADLs and D x 2/max A x 2 LE ADLs and functional mobility.  Poor sitting tolerance at this time; tx provided with Bipap in place. (4 L O2 PTA and Triology use.) Patient limited by 411#/habitus with current general debility. Expect she will benefit from skilled level rehab to maximize functional capacity prior to attempting to return to home. Patient will benefit from skilled intervention to address the above impairments. Patients rehabilitation potential is considered to be Fair  Factors which may influence rehabilitation potential include:   [ ]             None noted  [ ]             Mental ability/status  [X]             Medical condition  [ ]             Home/family situation and support systems  [ ]             Safety awareness  [ ]             Pain tolerance/management  [ ]             Other:        PLAN :  Recommendations and Planned Interventions:  [X]               Self Care Training                  [X]        Therapeutic Activities  [X]               Functional Mobility Training    [X]        Cognitive Retraining PRN  [X]               Therapeutic Exercises           [X]        Endurance Activities  [X]               Balance Training                   [ ]        Neuromuscular Re-Education  [ ]               Visual/Perceptual Training     [X]   Home Safety Training  [X]               Patient Education                 [X]        Family Training/Education  [X]               Other (comment): PLB, stress management, edema management     Frequency/Duration: Patient will be followed by occupational therapy 5 times a week to address goals. Discharge Recommendations: Skilled Nursing Facility  Further Equipment Recommendations for Discharge: TBA       SUBJECTIVE:   Patient stated I want to go back to rehab, the longer version I think would be better.       OBJECTIVE DATA SUMMARY:   HISTORY:   Past Medical History:   Diagnosis Date    Chronic obstructive pulmonary disease ([address]      Heart failure ([address]      Hypertension      Obesity       Past Surgical History:   Procedure Laterality Date    HX PACEMAKER            Prior Level of Function/Home Situation: 4L O2, sinkside bathing, mod I-min A self care with HH assist at RW/w/c level; recently received power w/c but has not yet used to functional comfort level. Expanded or extensive additional review of patient history: Debbie, 412#, 4L O2, COPD, acute on chronic respiratory failure     Home Situation  Home Environment: Private residence  # Steps to Enter: 0  Wheelchair Ramp: Yes  One/Two Story Residence: One story  Living Alone: No  Support Systems: None  Patient Expects to be Discharged to[de-identified] Assisted living  Current DME Used/Available at [facility]: Wheelchair, Wheelchair, power, Walker, rolling, Cane, straight (home ox)  Tub or Shower Type:  (sink bathes PTA only)  [X]  Right hand dominant             [ ]  Left hand dominant     EXAMINATION OF PERFORMANCE DEFICITS:  Cognitive/Behavioral Status:  Neurologic State: Alert; Appropriate for age  Orientation Level: Oriented X4  Cognition: Follows commands; Appropriate decision making; Appropriate for age attention/concentration; Appropriate safety awareness (but 412# so not making consistently good decisions re health)  Perception: Appears intact  Perseveration: No perseveration noted  Safety/Judgement: Fall prevention;Home safety     Skin: B LEs wrapped- Patient reports weeping as of last week     Edema: ++ B LEs     Hearing:   Auditory  Auditory Impairment: None     Vision/Perceptual:                           Acuity: Impaired near vision    Corrective Lenses: Reading glasses     Range of Motion:  B UE  AROM: Generally decreased, functional for brief motion; limited for functional AROM   B LEs limited against gravity                       Strength:  B UE  Strength: Generally decreased, functional very briefly; limited ability to maintain core psotion seated edge of bed                 Coordination:     Fine Motor Skills-Upper: Left Impaired;Right Impaired (mild, declines with fativue)    Gross Motor Skills-Upper: Left Impaired;Right Impaired (declines with activity)     Tone & Sensation:  B UE WFL     Sensation: Intact                       Balance:  Sitting: Impaired  Sitting - Static: Fair (occasional)  Sitting - Dynamic: Poor (constant support)  Standing:  (unable)     Functional Mobility and Transfers for ADLs:  Bed Mobility:  Supine to Sit: Assist x2;Total assistance  Sit to Supine: Assist x2;Maximum assistance  Scooting:  (unable)     Transfers:  Sit to Stand:  (reports she was in [facility] 1 week ago x 10 days)  Bed to Chair:  (unable)  Toilet Transfer :  (unable)     ADL Assessment:  Feeding: Setup     Oral Facial Hygiene/Grooming: Setup;Minimum assistance     Bathing: Maximum assistance; Total assistance     Upper Body Dressing: Moderate assistance     Lower Body Dressing: Total assistance     Toileting: Total assistance                 ADL Intervention and task modifications:     Initiated training of self direction, responsibility to assure therapy is participated in on consistent basis; able to verbalize understanding; PLB with activity encouraged 4L O2 in place with Bipap                                      Cognitive Retraining  Safety/Judgement: Fall prevention;Home safety     Therapeutic Activity:  Initiated training of B UE AROM HEP with close attention to VS during AROM as well as training of health benefits of consistent participation of HEP with goal of 30 or more cumulative minutes daily as a lifetime habit to maximize quality of life; patient presented with enthusiasm to this teaching- able to demonstrate B UE AROM with 4L O2 in place, B shoulder flexion, encouraged to start simply, and perform 2x/hour as initial goal, increasing quantity and frequency as able.  Able to verbalize understanding  Functional Measure:  Barthel Index:      Bathin  Bladder: 0  Bowels: 5  Groomin  Dressin  Feedin  Mobility: 0  Stairs: 0  Toilet Use: 0  Transfer (Bed to Chair and Back): 0  Total: 10         Barthel and G-code impairment scale:  Percentage of impairment CH  0% CI  1-19% CJ  20-39% CK  40-59% CL  60-79% CM  80-99% CN  100%   Barthel Score 0-100 100 99-80 79-60 59-40 20-39 1-19    0   Barthel Score 0-20 20 17-19 13-16 9-12 5-8 1-4 0      The Barthel ADL Index: Guidelines  1. The index should be used as a record of what a patient does, not as a record of what a patient could do. 2. The main aim is to establish degree of independence from any help, physical or verbal, however minor and for whatever reason. 3. The need for supervision renders the patient not independent. 4. A patient's performance should be established using the best available evidence. Asking the patient, friends/relatives and nurses are the usual sources, but direct observation and common sense are also important. However direct testing is not needed. 5. Usually the patient's performance over the preceding 24-48 hours is important, but occasionally longer periods will be relevant. 6. Middle categories imply that the patient supplies over 50 per cent of the effort. 7. Use of aids to be independent is allowed. Manny Baeza., Barthel, D.W. (1557). Functional evaluation: the Barthel Index. [address] [facility] (14)2. Colleen Kendrick jovan EMMA Henry, Kari Atkinson., Robert Winston., Frank, [address] [place] [address] Ave (1999). Measuring the change indisability after inpatient rehabilitation; comparison of the responsiveness of the Barthel Index and Functional [place] Measure. Journal of Neurology, Neurosurgery, and Psychiatry, 66(4), 533-769. Denzel Hsieh, N.J.A, MISSY Costa, & Concepcion Ortega, M.A. (2004.) Assessment of post-stroke quality of life in cost-effectiveness studies: The usefulness of the Barthel Index and the EuroQoL-5D. Quality of Life Research, 13, 001-18         G codes: In compliance with CMSs Claims Based Outcome Reporting, the following G-code set was chosen for this patient based on their primary functional limitation being treated:      The outcome measure chosen to determine the severity of the functional limitation was the Barthel Index  with a score of 10/100 which was correlated with the impairment scale. · Self Care:               - CURRENT STATUS:    CM - 80%-99% impaired, limited or restricted               - GOAL STATUS:           CJ - 20%-39% impaired, limited or restricted               - D/C STATUS:                       ---------------To be determined---------------      Occupational Therapy Evaluation Charge Determination   History Examination Decision-Making   LOW Complexity : Brief history review  HIGH Complexity : 5 or more performance deficits relating to physical, cognitive , or psychosocial skils that result in activity limitations and / or participation restrictions HIGH Complexity : Patient presents with comorbidities that affect occupational performance. Signifigant modification of tasks or assistance (eg, physical or verbal) with assessment (s) is necessary to enable patient to complete evaluation       Based  on the above components, the patient evaluation is determined to be of the following complexity level: LOW   Pain:  Pain Scale 1: Numeric (0 - 10)  Pain Intensity 1: 0              Activity Tolerance:   poor  Please refer to the flowsheet for vital signs taken during this treatment. After treatment:   [ ] Patient left in no apparent distress sitting up in chair  [X] Patient left in no apparent distress in bed  [X] Call bell left within reach  [ ] Nursing notified  [ ] Caregiver present  [ ] Bed alarm activated      COMMUNICATION/EDUCATION:   The patients plan of care was discussed with: Registered Nurse.  [X] Home safety education was provided and the patient/caregiver indicated understanding. [X] Patient/family have participated as able in goal setting and plan of care. [X] Patient/family agree to work toward stated goals and plan of care.   [ ] Patient understands intent and goals of therapy, but is neutral about his/her participation. [ ] Patient is unable to participate in goal setting and plan of care. This patients plan of care is appropriate for delegation to [facility].      Thank you for this referral.  Katy Robert OTR/L  Time Calculation: 26 mins

## 2017-10-16 NOTE — PROGRESS NOTES
Met with patient at bedside to introduce role and to assess for any needs/concerns regarding disposition. Patient admitted from home with complaints of shortness of breath. She was recently here and discharged to [facility] on 9/20/17. Patient currently lives at home with her . They share a house but live separately in the house. He is not her caregiver and does not take part in any of her care needs. She has walker, wheelchair, oxygen and trilogy at home. She states she is able to walk with her walker, but is unable to do her personal care without assistance. 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 CM discussed with her applying for SS benefits since she is 58years old. She states she does not want to go back to the home at discharge. Disposition will be difficult given her financial situation. 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 CM will continue to follow and assist with her disposition. Care Management Interventions  PCP Verified by CM: Yes (Dr. Andria Mckeon)  Palliative Care Criteria Met (RRAT>21 & CHF Dx)?: No  Mode of Transport at Discharge:  (TBD)  Transition of Care Consult (CM Consult): Discharge Planning  Discharge Durable Medical Equipment:  (Has walker, wheelchair, oxygen and trilogy at home)  Physical Therapy Consult: Yes  Occupational Therapy Consult: Yes  Current Support Network: Lives with Spouse, Own Home  Confirm Follow Up Transport:  (TBD)  Plan discussed with Pt/Family/Caregiver: Yes  Freedom of Choice Offered: Yes  Discharge Location  Discharge Placement:  (TBD)     Alejandro Tamayo RN CRM    Update-referral sent to [facility] via [facility].

## 2017-10-16 NOTE — PROGRESS NOTES
Hospitalist Progress Note  Jean-Pierre Lockwood MD  Answering service: [phone] from in house phone  Cell: [phone]      Date of Service:  10/16/2017  NAME:  Ching Arriaga  :  1955  MRN:  [id number]      Admission Summary: This is a 77-year-old  female with past medical history significant for chronic respiratory failure, home oxygen dependent 4 liters per minute via nasal cannula, COPD, morbid obesity, she uses Trilogy at home, hypertension, chronic systolic congestive heart failure, status post AICD, chronic kidney disease, who presented to [facility] Emergency Department with progressive shortness of breath one day ago. Interval history / Subjective:   Shortness of breath improving, no chest pain     Assessment & Plan:     Acute on chronic hypercapnic respiratory failure likely due to obesity   hypoventilation syndrome, acute COPD and systolic congestive heart failure exacerbation. - on BIPAP,SaO2s 99%, repeat ABG and chest x ray 10/16, will try to wean off oxygen during day time to goal of nasal cannula oxygen 4 liters which is patient's baseline at home  -continue iv solumedrol, pulmicort, duoneb, oxygen support and monitor pulse ox      Acute on chronic systolic CHF, NYHA class II, s/p AICD. -Chest x-ray shows mild and moderately severe cardiomyopathy on 10/14  -ECHO c/w EF 25% with severe hypokinesis on 2017  -continue Bumex to IV 1 mg b.i.d. KCl, coreg, hydralazine, isosorbide dinitrate and spironolactone  -she is not on ACE inhibitor or ARB due to CKD. -monitor intake and output, daily weights, oxygen support and continue cardiac monitoring.      COPD with acute exacerbation.   -on IV steroid 40 mg IV q.8, Pulmicort nebulizer treatment, DuoNeb neb   treatment, oxygen and BiPAP support and monitor pulse oximetry   monitoring.      CKD stage III.    -creatinine is stable.   -monitor renal function test. -avoid nephrotoxin     Hypokalemia  -replaced and resolved     HTN  -continue home medication, Coreg, hydralazine, isosorbide dinitrate, spironolactone and monitor blood pressure.      Hx of depression  -stable. Continue home medication Zoloft.      Super morbid obesity  -  diet and weight loss program education       Legs ulcers,stasis  -continue local wound care  -Wound care eval       Code status: Full Code  DVT prophylaxis: Heparin    Care Plan discussed with: Patient/Family, Nurse and   Disposition: TBD     Hospital Problems  Date Reviewed: 10/14/2017          Codes Class Noted POA    Acute respiratory failure ([facility] ICD-10-CM: J96.00  ICD-9-CM: 518.81  10/14/2017 Unknown        * (Principal)Acute on chronic respiratory failure ([facility] ICD-10-CM: J96.20  ICD-9-CM: 518.84  10/14/2017 Unknown              Vital Signs:    Last 24hrs VS reviewed since prior progress note. Most recent are:  Visit Vitals    BP 97/53 (BP 1 Location: Left arm, BP Patient Position: At rest;Lying right side)    Pulse 98    Temp 97.3 °F (36.3 °C)    Resp 20    Ht 5' 7\" (1.702 m)    Wt (!) 186.9 kg (412 lb)    SpO2 96%    BMI 64.53 kg/m2         Intake/Output Summary (Last 24 hours) at 10/16/17 0811  Last data filed at 10/16/17 0350   Gross per 24 hour   Intake                0 ml   Output              625 ml   Net             -625 ml        Physical Examination:             Constitutional:  On BiPAP, cooperative, Obes, pleasant    ENT:  Oral mucous moist, oropharynx benign. Neck supple,    Resp:  Decrease bronchial breath sound bilaterally, few expiratory wheezing, no rhonchi/rales. No accessory muscle use   CV:  Regular rhythm, normal rate, no murmurs, gallops, rubs    GI:  Soft, non distended, non tender. normoactive bowel sounds, no hepatosplenomegaly     Musculoskeletal:  No edema    Neurologic:  Moves all extremities, AAOx3, CN II-XII reviewed     Psych:  Good insight, Not anxious nor agitated.        Data Review: Review and/or order of clinical lab test      Labs:     Recent Labs      10/16/17   0345  10/15/17   0353   WBC  5.3  6.2   HGB  12.0  11.9   HCT  37.8  38.1   PLT  151  143*     Recent Labs      10/16/17   0345  10/15/17   0353  10/14/17   1509   NA  140  140  141   K  3.5  2.9*  3.9   CL  91*  92*  91*   CO2  38*  38*  44*   BUN  56*  50*  47*   CREA  1.98*  1.64*  1.61*   GLU  137*  125*  95   CA  8.5  8.9  9.1     Recent Labs      10/15/17   0353  10/14/17   1509   SGOT  19  37   ALT  29  34   AP  32*  32*   TBILI  3.7*  4.6*   TP  6.4  6.9   ALB  3.0*  3.2*   GLOB  3.4  3.7     No results for input(s): INR, PTP, APTT in the last 72 hours. No lab exists for component: INREXT   No results for input(s): FE, TIBC, PSAT, FERR in the last 72 hours. No results found for: FOL, RBCF   No results for input(s): PH, PCO2, PO2 in the last 72 hours.   Recent Labs      10/14/17   1509   TROIQ  0.07*     No results found for: CHOL, CHOLX, CHLST, CHOLV, HDL, LDL, LDLC, DLDLP, TGLX, TRIGL, TRIGP, CHHD, CHHDX  Lab Results   Component Value Date/Time    Glucose (POC) 124 09/15/2017 09:56 PM     No results found for: COLOR, APPRN, SPGRU, REFSG, KARISSA, PROTU, GLUCU, KETU, BILU, UROU, CONSUELO, LEUKU, GLUKE, EPSU, BACTU, WBCU, RBCU, CASTS, UCRY      Medications Reviewed:     Current Facility-Administered Medications   Medication Dose Route Frequency    calcium carbonate (TUMS) chewable tablet 200 mg [elemental]  200 mg Oral PRN    influenza vaccine 2017-18 (3 yrs+)(PF) (FLUZONE QUAD/FLUARIX QUAD) injection 0.5 mL  0.5 mL IntraMUSCular PRIOR TO DISCHARGE    albuterol-ipratropium (DUO-NEB) 2.5 MG-0.5 MG/3 ML  3 mL Nebulization Q4H PRN    aspirin delayed-release tablet 81 mg  81 mg Oral DAILY    budesonide (PULMICORT) 500 mcg/2 ml nebulizer suspension  500 mcg Nebulization BID RT    carvedilol (COREG) tablet 6.25 mg  6.25 mg Oral BID WITH MEALS    fenofibrate nanocrystallized (TRICOR) tablet 48 mg  48 mg Oral DAILY    hydrALAZINE (APRESOLINE) tablet 25 mg  25 mg Oral TID    isosorbide dinitrate (ISORDIL) tablet 10 mg  10 mg Oral TID    potassium chloride SR (KLOR-CON 10) tablet 20 mEq  20 mEq Oral BID    sertraline (ZOLOFT) tablet 100 mg  100 mg Oral DAILY    spironolactone (ALDACTONE) tablet 25 mg  25 mg Oral DAILY    methylPREDNISolone (PF) (SOLU-MEDROL) injection 40 mg  40 mg IntraVENous Q8H    bumetanide (BUMEX) injection 1 mg  1 mg IntraVENous BID    sodium chloride (NS) flush 5-10 mL  5-10 mL IntraVENous Q8H    sodium chloride (NS) flush 5-10 mL  5-10 mL IntraVENous PRN    ondansetron (ZOFRAN) injection 4 mg  4 mg IntraVENous Q4H PRN    heparin (porcine) injection 5,000 Units  5,000 Units SubCUTAneous Q8H    senna-docusate (PERICOLACE) 8.6-50 mg per tablet 1 Tab  1 Tab Oral QHS    polyethylene glycol (MIRALAX) packet 17 g  17 g Oral DAILY    levoFLOXacin (LEVAQUIN) 500 mg in D5W IVPB  500 mg IntraVENous Q24H     ______________________________________________________________________  EXPECTED LENGTH OF STAY: - - -  ACTUAL LENGTH OF STAY:          2                 Dung Dumont MD

## 2017-10-16 NOTE — PROGRESS NOTES
Problem: Discharge Planning  Goal: *Discharge to safe environment  Outcome: Progressing Towards Goal  See case management note.

## 2017-10-16 NOTE — PROGRESS NOTES
Problem: Mobility Impaired (Adult and Pediatric)  Goal: *Acute Goals and Plan of Care (Insert Text)  Physical Therapy Goals  Initiated 10/16/2017  1. Patient will move from supine to sit and sit to supine , scoot up and down and roll side to side in bed with modified independence within 7 day(s). 2. Patient will transfer from bed to chair and chair to bed with modified independence using the least restrictive device within 7 day(s). 3. Patient will perform sit to stand with modified independence within 7 day(s). 4. Patient will ambulate with modified independence for 15 feet with the least restrictive device within 7 day(s). Stair goal to be set prn  PHYSICAL THERAPY EVALUATION  Patient: Des Antunez (76 y.o. female)  Date: 10/16/2017  Primary Diagnosis: Acute respiratory failure (HCC)  Acute on chronic respiratory failure (HCC)        Precautions: fall         ASSESSMENT :  Based on the objective data described below, the patient presents with generalized weakness, impaired sitting balance, and decreased ability to meet basic mobility needs. Pt was received and left on BIPAP for eval. Pt attempted all that was asked of her. She required total assist X 2 to come to sitting at EOB, task made more difficult because of body habitus, with pt have much difficulty using her arms much to assist. Note impaired sitting balance, pt only briefly able to sit supporting self and note moments of excessive right lean and propping her elbow in sitting. Deemed not safe to stand today given impaired trunk control, pt weighing 411 pounds today. She was on BIPAP for the entire session and her 02 sats were stable in the mid 90s during session. Of note, BP taken at rest in right arm was 84/60 but was 120/65 in her left arm. Her nurse took a manual BP in pt's left arm which was very close to the one taken by monitor in her left arm.  Pt has experienced a decline in mobility from baseline and would benefit from rehab, specifically pulmonary rehab. .     Patient will benefit from skilled intervention to address the above impairments. Patients rehabilitation potential is considered to be Good  Factors which may influence rehabilitation potential include:   [ ]         None noted  [ ]         Mental ability/status  [X]         Medical condition  [X]         Home/family situation and support systems  [ ]         Safety awareness  [ ]         Pain tolerance/management  [ ]         Other:        PLAN :  Recommendations and Planned Interventions:  [X]           Bed Mobility Training             [ ]    Neuromuscular Re-Education  [X]           Transfer Training                   [ ]    Orthotic/Prosthetic Training  [X]           Gait Training                         [ ]    Modalities  [X]           Therapeutic Exercises           [ ]    Edema Management/Control  [X]           Therapeutic Activities            [X]    Patient and Family Training/Education  [ ]           Other (comment):     Frequency/Duration: Patient will be followed by physical therapy  5 times a week to address goals. Discharge Recommendations: Rehab, Inpatient Rehab and To Be Determined  Further Equipment Recommendations for Discharge:  to be determined, none if rehab. SUBJECTIVE:   Patient stated I'm sorry I couldn't do any more.       OBJECTIVE DATA SUMMARY:   Consult received, chart reviewed, pt cleared by nursing  HISTORY:    Past Medical History:   Diagnosis Date    Chronic obstructive pulmonary disease ([address]      Heart failure ([address]      Hypertension      Obesity       Past Surgical History:   Procedure Laterality Date    HX PACEMAKER         Prior Level of Function/Home Situation: mostly uses her wheelchair for locomotion. Her bathroom in not wheelchair accessible, so amb into the bathroom. Reports hasn't had any falls in the last year. Fairly recently acquired a power wheelchair that she hasn't used much.   Personal factors and/or comorbidities impacting plan of care: MO,  heart failure, HTN, CKD, COPD on 4 liters of 02 at baseline      [address]: Private residence  # Steps to Enter: 0  Wheelchair Ramp: Yes  One/Two Story Residence: One story  Living Alone: No  Support Systems: None  Patient Expects to be Discharged to[de-identified] Assisted living  Current DME Used/Available at Home: Wheelchair, Wheelchair, power, Walker, rolling, Cane, straight (home ox)     EXAMINATION/PRESENTATION/DECISION MAKING:   Critical Behavior:  Neurologic State: Alert  Orientation Level: Oriented X4  Cognition: Follows commands, Appropriate safety awareness, Appropriate for age attention/concentration, Appropriate decision making     Hearing: Auditory  Auditory Impairment: None  Skin:  LEs wounds, dressings in place. Refer to MD and nursing notes  Edema: yes LEs. Range Of Motion:  AROM: Generally decreased, functional                       Strength:    Strength: Generally decreased, functional                    Tone & Sensation:                  Sensation: Intact               Coordination:     Vision:      Functional Mobility:  Bed Mobility:     Supine to Sit: Assist x2;Total assistance  Sit to Supine: Assist x2;Maximum assistance     Transfers:               not assessed               Balance:   Sitting: Impaired  Sitting - Static: Fair (occasional, but nearly constant)  Sitting - Dynamic: Poor (constant support)  Ambulation/Gait Training:                                                                 n/a              Stairs:                           Therapeutic Exercises:         Functional Measure:  Timed up and go:      Timed Get Up And Go Test: 0 (pt unable)      Timed Up and Go and G-code impairment scale:  Percentage of Impairment CH     0%    CI     1-19% CJ     20-39% CK     40-59% CL     60-79% CM     80-99% CN      100%   Timed   Score 0-56 10 11-12 13-14 15-16 17-18 19 20          < than 10 seconds=Normal  Greater then 13.5 seconds (in elderly)=Increased fall risk Sandro LOW. Predicting the probability for falls in community dwelling older adults using the Timed Up and Go Test. Phys Ther. 2000;80:896-903. G codes: In compliance with CMSs Claims Based Outcome Reporting, the following G-code set was chosen for this patient based on their primary functional limitation being treated: The outcome measure chosen to determine the severity of the functional limitation was the TUG with a score of 0 which was correlated with the impairment scale. · Mobility - Walking and Moving Around:               - CURRENT STATUS:    CN - 100% impaired, limited or restricted               - GOAL STATUS:           CK - 40%-59% impaired, limited or restricted               - D/C STATUS:                       ---------------To be determined---------------      Physical Therapy Evaluation Charge Determination   History Examination Presentation Decision-Making   HIGH Complexity :3+ comorbidities / personal factors will impact the outcome/ POC  LOW Complexity : 1-2 Standardized tests and measures addressing body structure, function, activity limitation and / or participation in recreation  LOW Complexity : Stable, uncomplicated  LOW Complexity : FOTO score of       Based on the above components, the patient evaluation is determined to be of the following complexity level: LOW      Pain:  Pain Scale 1: Numeric (0 - 10)  Pain Intensity 1: 0              Activity Tolerance:   See assessment above  Please refer to the flowsheet for vital signs taken during this treatment.   After treatment:   [ ]         Patient left in no apparent distress sitting up in chair  [X]         Patient left in no apparent distress in bed  [X]         Call bell left within reach  [X]         Nursing notified  [ ]         Caregiver present  [ ]         Bed alarm activated      COMMUNICATION/EDUCATION:   The patients plan of care was discussed with: Registered Nurse and . [X]         Fall prevention education was provided and the patient/caregiver indicated understanding. [X]         Patient/family have participated as able in goal setting and plan of care. [X]         Patient/family agree to work toward stated goals and plan of care. [ ]         Patient understands intent and goals of therapy, but is neutral about his/her participation. [ ]         Patient is unable to participate in goal setting and plan of care.      Thank you for this referral.  Arlene Munoz   Time Calculation: 32 mins

## 2017-10-16 NOTE — PROGRESS NOTES
Orders received, chart reviewed and patient evaluated by physical therapy. Recommend patient to discharge to inpt rehab, pulmonary rehab pending progress with skilled acute care physical therapy. Recommend with nursing patient to complete as able in order to maintain strength, endurance and independence: OOB to chair via angela lift. Thank you for your assistance. Full evaluation to follow.      Migel Park, PT

## 2017-10-17 NOTE — PROGRESS NOTES
Day #4 of Levaquin  Indication:  COPD exacerbation  Current regimen:  500 mg IV Q 24 hours    Recent Labs      10/17/17   0348  10/16/17   0345  10/15/17   0353   WBC  5.2  5.3  6.2   CREA  2.79*  1.98*  1.64*   BUN  68*  56*  50*     Est CrCl: 36.8 ml/min  Temp (24hrs), Av.5 °F (36.4 °C), Min:96.1 °F (35.6 °C), Max:97.9 °F (36.6 °C)    Plan: Change to 250 mg PO Q 24 hours (per IV to PO protocol) and per [facility] P&T Committee Protocol with respect to renal function. Pharmacy will continue to monitor patient daily and will make dosage adjustments based upon changing renal function.     Rukhsana Carvajal, PharmD, BCPS

## 2017-10-17 NOTE — CONSULTS
Cardiology Consult Note      Patient Name: Roosevelt Hooks  :  MRN: [id number]  Date: 10/17/2017  Time: 11:30 AM    Admit Diagnosis: Acute respiratory failure ([address]  Acute on chronic respiratory failure [facility])    Primary Cardiologist: Dr. Jesi Norris Cardiologist: Jesus Marcos. Meredith Whitaker M.D.    Reason for Consult: CHF    Requesting MD: Alexander Matthews MD    HPI:  Roosevelt Hooks is a 58 y.o. female admitted on 10/14/2017  for Acute respiratory failure ([address]  Acute on chronic respiratory failure ([address] Past medical history of Chronic obstructive pulmonary disease ([address] Heart failure ([address] Hypertension; and Obesity. Presented to ED via EMS with c/o SOB increasing over the last week, prior to admission. She states she successfully completed inpatient rehab at [facility] and returned to home. She does have a BIPAP at home; However, the mask fits poorly and has leaks. In regards to body weight, upon last admission she states she weighed 450 pounds, at home prior to admission this time, she was weighing 417 pounds. Subjective:  She is feeling better. BIPAP in place and in no distress. She lives at home by herself. She mostly has no help. Assessment and Plan     1: CHF, systolic chronic                        - NYHA class II -III: difficult to assess with hypoxia related to COPD exacerbation. - EF 25% on echo 2017                        - Coreg 6.25 mg BID                        - No ACE/ARB 2/2 CKD                        - Hydralazine 25 mg TID                        - Isordil 10 mg PO TID                        - Diuretics stopped as she is dry and over diuresed                        - [facility]ICD [facility]     2: Cardiomyopathy with LVEF 25%                        - AICD in place - wide complex - Perhaps BiV in future                        - GDT meds on board, Aldactone held  3: COPD.                         - Per Primary team                        - Trilogy for home, BIPAP here             - She will need an appropriately fitting mask  4. A/C hypercapnic hypoxic respiratory failure             - BIPAP prn             - Trilogy at home, needs new mask   5: CRF, presumably diuretics                        - Seem like baseline 1.5-1.8             - Cr. 2.59 - hold diuretics  6. Body mass index is 69.44 kg/(m^2). Morbidly obese. Diet and exercise discussed     She is very dry. Cr up, c/o no tears, dry mouth. Weigh down substantially from last admit. Diuretics on hold. Workman for strict I/Os and continue current CHF meds. Needs new mask for home trilogy system as current one leaks. Follow up labs and Palliative Care consulted. Cardiology attending: seen and examined. Agree with assess and plan  Hopefully she is overdiuresed and creatinine will recover will holding diuretics. If not, may need advanced heart failure evaluation, inotropes etc.    No specialty comments available. Review of Symptoms:  A comprehensive review of systems was negative except for that written in the HPI.     Previous treatment/evaluation includes echocardiogram .  Cardiac risk factors: dyslipidemia, obesity, sedentary life style, hypertension, post-menopausal.    Past Medical History:   Diagnosis Date    Chronic obstructive pulmonary disease (HCC)     Heart failure (HCC)     Hypertension     Obesity      Past Surgical History:   Procedure Laterality Date    HX PACEMAKER       Current Facility-Administered Medications   Medication Dose Route Frequency    albuterol-ipratropium (DUO-NEB) 2.5 MG-0.5 MG/3 ML  3 mL Nebulization QID RT    methylPREDNISolone (PF) (SOLU-MEDROL) injection 40 mg  40 mg IntraVENous Q12H    calcium carbonate (TUMS) chewable tablet 200 mg [elemental]  200 mg Oral PRN    influenza vaccine 2017-18 (3 yrs+)(PF) (FLUZONE QUAD/FLUARIX QUAD) injection 0.5 mL  0.5 mL IntraMUSCular PRIOR TO DISCHARGE    albuterol-ipratropium (DUO-NEB) 2.5 MG-0.5 MG/3 ML  3 mL Nebulization Q4H PRN    aspirin delayed-release tablet 81 mg  81 mg Oral DAILY    budesonide (PULMICORT) 500 mcg/2 ml nebulizer suspension  500 mcg Nebulization BID RT    carvedilol (COREG) tablet 6.25 mg  6.25 mg Oral BID WITH MEALS    fenofibrate nanocrystallized (TRICOR) tablet 48 mg  48 mg Oral DAILY    hydrALAZINE (APRESOLINE) tablet 25 mg  25 mg Oral TID    isosorbide dinitrate (ISORDIL) tablet 10 mg  10 mg Oral TID    sertraline (ZOLOFT) tablet 100 mg  100 mg Oral DAILY    sodium chloride (NS) flush 5-10 mL  5-10 mL IntraVENous Q8H    sodium chloride (NS) flush 5-10 mL  5-10 mL IntraVENous PRN    ondansetron (ZOFRAN) injection 4 mg  4 mg IntraVENous Q4H PRN    heparin (porcine) injection 5,000 Units  5,000 Units SubCUTAneous Q8H    senna-docusate (PERICOLACE) 8.6-50 mg per tablet 1 Tab  1 Tab Oral QHS    polyethylene glycol (MIRALAX) packet 17 g  17 g Oral DAILY    levoFLOXacin (LEVAQUIN) 500 mg in D5W IVPB  500 mg IntraVENous Q24H       No Known Allergies   History reviewed. No pertinent family history. Social History     Social History    Marital status: LEGALLY      Spouse name: N/A    Number of children: N/A    Years of education: N/A     Social History Main Topics    Smoking status: Former Smoker    Smokeless tobacco: Never Used    Alcohol use No    Drug use: None    Sexual activity: Not Asked     Other Topics Concern    None     Social History Narrative       Objective:    Physical Exam    Vitals:   Vitals:    10/17/17 0728 10/17/17 0830 10/17/17 0854 10/17/17 0912   BP:   (!) 126/98    Pulse:   100    Resp:       Temp:       SpO2: 94% (!) 88%  90%   Weight:       Height:           General:    Alert, cooperative, no distress, appears stated age. Neck:   Supple, no JVD.    Back:     Not assessed   Lungs:     Clear to auscultation bilaterally, anterolaterally   Heart[de-identified]    Regular rate and rhythm, S1, S2 normal, no murmur, click, rub or gallop. Abdomen:     Firm, non-tender. Bowel sounds normal. Morbidly obese with overhanging pannus   Extremities:   Extremities normal, atraumatic, no cyanosis tr edema. Vascular:   Pulses - 2+ radials, DP   Skin:   Skin with venous stasis change on legs, bruising and lower leg wraps   Neurologic:   CN II-XII grossly intact. Telemetry: paced    ECG: paced    Data Review:     Radiology: CXR with poor quality. Underexposed via [address] d/t body habitus of patient. Recent Labs      10/14/17   1509   TROIQ  0.07*     Recent Labs      10/17/17   0348  10/16/17   0345   NA  138  140   K  4.1  3.5   CL  91*  91*   CO2  39*  38*   BUN  68*  56*   CREA  2.79*  1.98*   GLU  144*  137*   CA  8.6  8.5     Recent Labs      10/17/17   0348  10/16/17   0345   WBC  5.2  5.3   HGB  12.1  12.0   HCT  38.8  37.8   PLT  146*  151     Recent Labs      10/17/17   0348  10/15/17   0353   SGOT  17  19   AP  35*  32*     No results for input(s): CHOL, LDLC in the last 72 hours. No lab exists for component: TGL, HDLC,  HBA1C  No results for input(s): CRP, TSH, TSHEXT in the last 72 hours.     No lab exists for component: ESR    Rupinder Watt MD           [facility] [address] [place] [address] [address]     [facility]     [phone]    CC:Richard Oconnor MD

## 2017-10-17 NOTE — PROGRESS NOTES
Problem: Falls - Risk of  Goal: *Absence of Falls  Document Nas Fall Risk and appropriate interventions in the flowsheet.    Outcome: Progressing Towards Goal  Fall Risk Interventions:  Mobility Interventions: Bed/chair exit alarm, Communicate number of staff needed for ambulation/transfer           Medication Interventions: Bed/chair exit alarm, Evaluate medications/consider consulting pharmacy     Elimination Interventions: Call light in reach, Patient to call for help with toileting needs, Toileting schedule/hourly rounds, Bed/chair exit alarm     History of Falls Interventions: Consult care management for discharge planning, Evaluate medications/consider consulting pharmacy, Investigate reason for fall

## 2017-10-17 NOTE — PROGRESS NOTES
Bedside and Verbal shift change report given to Ronaldo Courtney, RN and Marilynn Ponce RN (oncoming nurse) by Chelita Alejandre RN (offgoing nurse). Report included the following information SBAR, Kardex, ED Summary, Procedure Summary, Intake/Output, Recent Results and Cardiac Rhythm vpaced. Hourly rounding performed. Problem: Falls - Risk of  Goal: *Absence of Falls  Document Isaias Fall Risk and appropriate interventions in the flowsheet. Outcome: Progressing Towards Goal  Fall Risk Interventions:  Mobility Interventions: OT consult for ADLs, Patient to call before getting OOB, PT Consult for mobility concerns, PT Consult for assist device competence, Communicate number of staff needed for ambulation/transfer, Utilize walker, cane, or other assitive device, Strengthening exercises (ROM-active/passive)           Medication Interventions: Evaluate medications/consider consulting pharmacy, Patient to call before getting OOB, Teach patient to arise slowly     Elimination Interventions: Call light in reach, Toileting schedule/hourly rounds, Patient to call for help with toileting needs           Pt educated on fall prevention. Pt verbalizes understanding. Bedside isaias tool used with pt. Call bell and frequently used items within reach. Pt utilizes call bell appropriately for assistance.

## 2017-10-17 NOTE — WOUND CARE
WOCN Note:   New consult placed by RN for wound care; Chart review revealed:   Admitted for respiratory failure; history of OBESITY,CHF,COPD,SOB  Admitted from HOME    Assessment:   Patient is alert, verbal;  Bed: [facility]  Patient has a pure wick in place;  Diet: cardiac  Patient denied pain;  Bilateral heel, buttocks, and sacral skin intact and without erythema. Palpable DP pulses bilaterally. Bilateral lower extremities with edema,hemosiderosis staining;     1. POA right leg dry, stable scab measured 8.5cm x 8cm x 0.0cm consistent with resolving venous ulcer; moisturized skin with protective ointment;   2. POA left leg wound measured 2.5cm x 2.5cm x 0.1cm consistent with venous ulcer; cleansed with normal saline, applied vaseline gauze, covered with dry gauze, secured with mepilex;   3. POA abdominal dry, stable scab measured 1.5cm x 2cm x 0.0cm;    Patient reposition[facility] with Elma from supine to left and [facility] to offload bony prominences; Heels offloaded on pillow. Skin/wound treatment Recommendations:    Daily moisturize bilateral lower extremities with protective ointment;   Daily cleanse left lower leg wound with normal saline, apply vaseline gauze, cover with dry gauze, secure with mepilex; Skin Care & Pressure Injury Prevention Recommendations:  1. Minimize layers of linen/pads under patient to optimize support surface. 2.  Reposition patient approximately every 2 hours;  3. Float heels with pillow under calves. 4.  promote continence;   5. Called Kailash with [facility] for total care bariatric bed  6. Assess/protect skin in contact with medical devices. Keep skin moisturized;   7. Ensure that patient is repositioning in chair shifting weight approximately every 15 minutes and standing up every hour.      Discussed above assessment and recommendations with Elma GARZA) and Dr. Maicol Davidson;    Transition of Care: Plan to follow weekly and as needed while admitted to hospital.    Delio Harper, BSN, RN, [facility]  Certified Wound, Ostomy, Continence Nurse  office [phone]  pager Luz Maria Conklin, [place]

## 2017-10-17 NOTE — CONSULTS
Palliative Medicine Consult  [place]: [phone] ([phone])    Patient Name: Richard Coker  YOB: 1955    Date of Initial Consult: 10-17-17  Reason for Consult: Care decisions   Requesting Provider: Bud/Brandt (primary cardiologist is Dr Jermaine Issa)  Primary Care Physician: Baldo Domínguez MD      SUMMARY:   Richard Coker is a 58 y.o. with a past history of COPD on home Trilogy, morbid obesity,CHF (NYHA II, EF 25% 9/2017), ICD placed at [facility], CKD who was admitted on 10/14/2017 from home with worsening SOB due to pulmonry edema, being diuresed. Pt was hospitalized for similar sx in Sept 2017, went to [facility] and then back home where she lives alone. States that her dtr-in-law main source of support. Current medical issues leading to Palliative Medicine involvement include: care decisions. PALLIATIVE DIAGNOSES:   1. Shortness of breath on BIPAP- COPD and CHF  2. Debility due to breathing, weight, leg ulcers  3. Mult medical issues as outlined above        PLAN:   1. Meet w/ pt who now is on nightly and prn BIPAP, however currently cannot come off mask as she states she is waiting for one that fits better. 2. Pt quite pleasant and engaging despite being on the BIPAP- introduce palliative services. She admits to being less mobile at home and requiring the Trilogy more and more often. 3. Lives at home alone, and while her dtr-in-law can help her w/ grocery shopping and errands she is limited in her ability to care for herself. Her ex- lives in same home but does not assist in her care. 4. Will come back tmrw or later today, will see if can talk further w/out mask in place- will address at least an AMD which is very important considering her mult chronic illnesses. 5. Initial consult note routed to primary continuity provider  6.  Communicated plan of care with: Palliative IDT       GOALS OF CARE / TREATMENT PREFERENCES:   [====Goals of Care====]  GOALS OF CARE:  Patient / health care proxy stated goals: improve breathing       TREATMENT PREFERENCES:   Code Status: Full Code    Advance Care Planning:- to be determined   Advance Care Planning 10/15/2017   Patient's Healthcare Decision Maker is: Legal Next of Kin   Confirm Advance Directive None       Other:    The palliative care team has discussed with patient / health care proxy about goals of care / treatment preferences for patient.  [====Goals of Care====]         HISTORY:     History obtained from:  Pt, chart     CHIEF COMPLAINT: SOB    HPI/SUBJECTIVE:    The patient is:   [x] Verbal and participatory  [] Non-participatory due to:     Pt in bed w/ BIPAP in place, denies SOB currently but mask not fitting correctly. OT worked w/ her yest and notes that now she is min-mod A with UE ADLS and max A/dependent for ADLs and mobility.     Clinical Pain Assessment (nonverbal scale for severity on nonverbal patients):   [++++ Clinical Pain Assessment++++]  [++++Pain Severity++++]: Pain: 0  [++++Pain Character++++]:   [++++Pain Duration++++]:   [++++Pain Effect++++]:   [++++Pain Factors++++]:   [++++Pain Frequency++++]:   [++++Pain Location++++]:   [++++ Clinical Pain Assessment++++]  Duration: for how long has pt been experiencing pain (e.g., 2 days, 1 month, years)  Frequency: how often pain is an issue (e.g., several times per day, once every few days, constant)     FUNCTIONAL ASSESSMENT:     Palliative Performance Scale (PPS):  PPS: 50       PSYCHOSOCIAL/SPIRITUAL SCREENING:     Advance Care Planning:  Advance Care Planning 10/15/2017   Patient's Healthcare Decision Maker is: Legal Next of Kin   Confirm Advance Directive None        Any spiritual / Jew concerns:  [] Yes /  [x] No    Caregiver Burnout:  [] Yes /  [] No /  [x] No Caregiver Present      Anticipatory grief assessment:   [x] Normal  / [] Maladaptive       ESAS Anxiety: Anxiety: 2    ESAS Depression: Depression: 0        REVIEW OF SYSTEMS:     Positive and pertinent negative findings in ROS are noted above in HPI. The following systems were [x] reviewed / [] unable to be reviewed as noted in HPI  Other findings are noted below. Systems: constitutional, ears/nose/mouth/throat, respiratory, gastrointestinal, genitourinary, musculoskeletal, integumentary, neurologic, psychiatric, endocrine. Positive findings noted below. Modified ESAS Completed by: provider   Fatigue: 8 Drowsiness: 0   Depression: 0 Pain: 0   Anxiety: 2 Nausea: 0   Anorexia: 3 Dyspnea: 1     Constipation: No     Stool Occurrence(s): 1        PHYSICAL EXAM:     From RN flowsheet:  Wt Readings from Last 3 Encounters:   10/17/17 (!) 410 lb (186 kg)   09/20/17 (!) 443 lb 5.5 oz (201.1 kg)     Blood pressure 115/66, pulse 100, temperature 96.1 °F (35.6 °C), resp. rate 22, height 5' 7\" (1.702 m), weight (!) 410 lb (186 kg), SpO2 90 %. Pain Scale 1: Numeric (0 - 10)  Pain Intensity 1: 0                Constitutional: awake, alert, oriented, engaging   Eyes: pupils equal, anicteric  ENMT: no nasal discharge, moist mucous membranes  Respiratory: breathing not labored on BIPAP  Musculoskeletal: LE w/ swelling and wrapped   Skin: warm, dry  Neurologic: following commands, moving all extremities  Psychiatric: full affect, no hallucinations       HISTORY:     Principal Problem:    Acute on chronic respiratory failure ([address] (10/14/2017)    Active Problems:    Acute respiratory failure ([address] (10/14/2017)      Past Medical History:   Diagnosis Date    Chronic obstructive pulmonary disease ([address]     Heart failure ([address]     Hypertension     Obesity       Past Surgical History:   Procedure Laterality Date    HX PACEMAKER        History reviewed. No pertinent family history. History reviewed, no pertinent family history.   Social History   Substance Use Topics    Smoking status: Former Smoker    Smokeless tobacco: Never Used    Alcohol use No     No Known Allergies   Current Facility-Administered Medications Medication Dose Route Frequency    albuterol-ipratropium (DUO-NEB) 2.5 MG-0.5 MG/3 ML  3 mL Nebulization QID RT    methylPREDNISolone (PF) (SOLU-MEDROL) injection 40 mg  40 mg IntraVENous Q12H    calcium carbonate (TUMS) chewable tablet 200 mg [elemental]  200 mg Oral PRN    influenza vaccine 2017-18 (3 yrs+)(PF) (FLUZONE QUAD/FLUARIX QUAD) injection 0.5 mL  0.5 mL IntraMUSCular PRIOR TO DISCHARGE    albuterol-ipratropium (DUO-NEB) 2.5 MG-0.5 MG/3 ML  3 mL Nebulization Q4H PRN    aspirin delayed-release tablet 81 mg  81 mg Oral DAILY    budesonide (PULMICORT) 500 mcg/2 ml nebulizer suspension  500 mcg Nebulization BID RT    carvedilol (COREG) tablet 6.25 mg  6.25 mg Oral BID WITH MEALS    fenofibrate nanocrystallized (TRICOR) tablet 48 mg  48 mg Oral DAILY    hydrALAZINE (APRESOLINE) tablet 25 mg  25 mg Oral TID    isosorbide dinitrate (ISORDIL) tablet 10 mg  10 mg Oral TID    sertraline (ZOLOFT) tablet 100 mg  100 mg Oral DAILY    sodium chloride (NS) flush 5-10 mL  5-10 mL IntraVENous Q8H    sodium chloride (NS) flush 5-10 mL  5-10 mL IntraVENous PRN    ondansetron (ZOFRAN) injection 4 mg  4 mg IntraVENous Q4H PRN    heparin (porcine) injection 5,000 Units  5,000 Units SubCUTAneous Q8H    senna-docusate (PERICOLACE) 8.6-50 mg per tablet 1 Tab  1 Tab Oral QHS    polyethylene glycol (MIRALAX) packet 17 g  17 g Oral DAILY    levoFLOXacin (LEVAQUIN) 500 mg in D5W IVPB  500 mg IntraVENous Q24H          LAB AND IMAGING FINDINGS:     Lab Results   Component Value Date/Time    WBC 5.2 10/17/2017 03:48 AM    HGB 12.1 10/17/2017 03:48 AM    PLATELET 813 93/58/3251 03:48 AM     Lab Results   Component Value Date/Time    Sodium 138 10/17/2017 03:48 AM    Potassium 4.1 10/17/2017 03:48 AM    Chloride 91 10/17/2017 03:48 AM    CO2 39 10/17/2017 03:48 AM    BUN 68 10/17/2017 03:48 AM    Creatinine 2.79 10/17/2017 03:48 AM    Calcium 8.6 10/17/2017 03:48 AM    Magnesium 2.4 09/17/2017 04:51 AM      Lab Results   Component Value Date/Time    AST (SGOT) 17 10/17/2017 03:48 AM    Alk. phosphatase 35 10/17/2017 03:48 AM    Protein, total 6.7 10/17/2017 03:48 AM    Albumin 3.1 10/17/2017 03:48 AM    Globulin 3.6 10/17/2017 03:48 AM     No results found for: INR, PTMR, PTP, PT1, PT2, APTT   No results found for: IRON, FE, TIBC, IBCT, PSAT, FERR   No results found for: PH, PCO2, PO2  No components found for: GLPOC   No results found for: CPK, CKMB             Total time: 50 min   Counseling / coordination time, spent as noted above: 35 min   > 50% counseling / coordination?: yes    Prolonged service was provided for  []30 min   []75 min in face to face time in the presence of the patient, spent as noted above. Time Start:   Time End:   Note: this can only be billed with 60753 (initial) or 70015 (follow up). If multiple start / stop times, list each separately.

## 2017-10-17 NOTE — PROGRESS NOTES
Problem: Falls - Risk of  Goal: *Absence of Falls  Document Nas Fall Risk and appropriate interventions in the flowsheet. Outcome: Resolved/Met Date Met:  10/16/17  Fall Risk Interventions:  Mobility Interventions: OT consult for ADLs, PT Consult for assist device competence, PT Consult for mobility concerns, Communicate number of staff needed for ambulation/transfer, Bed/chair exit alarm           Medication Interventions: Bed/chair exit alarm, Evaluate medications/consider consulting pharmacy, Assess postural VS orthostatic hypotension, Patient to call before getting OOB, Teach patient to arise slowly     Elimination Interventions:  Toileting schedule/hourly rounds, Call light in reach

## 2017-10-17 NOTE — PROGRESS NOTES
Met with patient at bedside to discuss disposition. Patient requested that a referral be sent to [facility]. Patient also made aware that [facility] is reviewing her medicals for admission. Referral sent to [facility] via cclink.     Omaira Cárdenas RN CRM

## 2017-10-17 NOTE — PROGRESS NOTES
Problem: Mobility Impaired (Adult and Pediatric)  Goal: *Acute Goals and Plan of Care (Insert Text)  Physical Therapy Goals  Initiated 10/16/2017  1. Patient will move from supine to sit and sit to supine , scoot up and down and roll side to side in bed with modified independence within 7 day(s). 2. Patient will transfer from bed to chair and chair to bed with modified independence using the least restrictive device within 7 day(s). 3. Patient will perform sit to stand with modified independence within 7 day(s). 4. Patient will ambulate with modified independence for 15 feet with the least restrictive device within 7 day(s). Stair goal to be set prn   PHYSICAL THERAPY TREATMENT  Patient: Kelli Leung (70 y.o. female)  Date: 10/17/2017  Diagnosis: Acute respiratory failure (HCC)  Acute on chronic respiratory failure (HCC) Acute on chronic respiratory failure (HCC)       Precautions: Fall, Skin (strict I&O; 4L O2 PTA; Trilogy at home; EF 25%)      ASSESSMENT:  Pt received in bed on BIPAP agreeable to PT resting 02 sats 96-97%. Pt came to sitting at EOB with mod assist X 2 and note good sitting balance. 02 sats remained stable on BIPAP. She was agreeable to coming to a stand to a rolling walker, used bariatric one with pt starting with her right foot inside the walker and her left foot outside of it because both feet wound not fit inside at the same time. With max assist X 2 and stand by assist of another with equipment management of BIPAP tubing. On first attempt pt did not achieve standing and asked us to move away from her. She was educated that we must be close to assist her. In addition, she had reported that her left knee was her most problematic one. I blocked it with my legs for sit to stand. She stood for a total of about a minute and then fatigued. Her 02 sats remained stable on BIPAP at 96-97%. She was returned to supine with mod assist X 2.  For OOB to the chair I recommend use of angela and a bariatric chair is already in her room. Yesterday rehab tech placed a waffle cushion in pt's room for OOB to the chair. Progression toward goals:  [ ]    Improving appropriately and progressing toward goals  [X]    Improving slowly and progressing toward goals  [ ]    Not making progress toward goals and plan of care will be adjusted       PLAN:  Patient continues to benefit from skilled intervention to address the above impairments. Continue treatment per established plan of care. Discharge Recommendations:  Rehab, Inpatient Rehab, [place] and To Be Determined  Further Equipment Recommendations for Discharge:  None anticipated, needs rehab       SUBJECTIVE:   Patient stated I'm sorry I couldn't do more.       OBJECTIVE DATA SUMMARY:   Chart checked pt cleared by nursing  Critical Behavior:  Neurologic State: Alert  Orientation Level: Oriented X4  Cognition: Follows commands, Appropriate for age attention/concentration, Appropriate decision making, Appropriate safety awareness  Safety/Judgement: Fall prevention, Home safety  Functional Mobility Training:  Bed Mobility:     Supine to Sit: Assist x2; Moderate assistance  Sit to Supine: Assist x2;Maximum assistance           Transfers:  Sit to Stand: Assist x2;Maximum assistance (third person managing equipment)                                Balance:  Sitting: Impaired  Sitting - Static: Good (unsupported)  Sitting - Dynamic: Fair (occasional)  Standing: Impaired  Standing - Static: Poor  Standing - Dynamic : Poor  Ambulation/Gait Training:                                                                 n/a  Stairs:                       Neuro Re-Education:     Therapeutic Exercises:      Pain:                    Activity Tolerance:   Refer to assessment above  Please refer to the flowsheet for vital signs taken during this treatment.   After treatment:   [ ]    Patient left in no apparent distress sitting up in chair  [X]    Patient left in no apparent distress in bed  [X]    Call bell left within reach  [X]    Nursing notified  [ ]    Caregiver present  [ ]    Bed alarm activated      COMMUNICATION/COLLABORATION:   The patients plan of care was discussed with: Registered Nurse     Colleen Castañeda   Time Calculation: 24 mins

## 2017-10-17 NOTE — PROGRESS NOTES
Occupational Therapy  Chart reviewed; Patient on bipap; not able to participate in therapy today. Note meeting with palliative. Will retry tomorrow.   Kim Hui OTR/L

## 2017-10-17 NOTE — PROGRESS NOTES
Hospitalist Progress Note        Date of Service:  10/17/2017  NAME:  Amrit Smith  :  1955  MRN:  [id number]      Admission Summary: This is a 60-year-old  female with past medical history significant for chronic respiratory failure, home oxygen dependent 4 liters per minute via nasal cannula, COPD, morbid obesity, she uses Trilogy at home, hypertension, chronic systolic congestive heart failure, status post AICD, chronic kidney disease, who presented to [facility] Emergency Department with progressive shortness of breath one day ago. Interval history / Subjective:   Feels about the same as yesterday. Breathing is unchanged. No chest pain. Has a cough that won't prasad. No n/v/d, no orthopnea. Assessment & Plan:     Acute on chronic hypercapnic respiratory failure: multifactorial due to TEAGAN/OHS, COPD, and CHF  -BiPAP weaned from continuous to qhs and prn, continue weaning O2 to home settings  -continue duonebs and steroids; wean the latter      Acute on chronic systolic CHF, NYHA class II at baseline, IV on admission, s/p AICD. -CXR showed pulmonary edema  -ECHO c/w EF 25% with severe hypokinesis on 2017  -continue coreg, hydralazine, isosorbide dinitrate  -hold Aldactone and morning Lasix dose due to significant rise in Cr; resume tomorrow  -she is not on ACE inhibitor or ARB due to CKD. -monitor I/O's, daily weight  -consult cardiology      COPD with acute exacerbation.   -continue bronchodilators, steroids, BiPAP     SANTOS on CKD stage III: suspect due to diuresis.  She was also hypotensive yesterday which may have led to ATN  -hold diuretics as above; resume tomorrow     Hypokalemia  -replaced and resolved     HTN  -continue Coreg, hydralazine, isosorbide dinitrate  -holding Aldactone as above     Hx of depression  -continue Zoloft     Morbid obesity     Legs ulcers, chronic venous stasis  -continue local wound care    Code status: Full Code  DVT prophylaxis: sq heparin    Care Plan discussed with: Patient/Family, Nurse and   Disposition: TBD     Hospital Problems  Date Reviewed: 10/14/2017          Codes Class Noted POA    Acute respiratory failure ([facility] ICD-10-CM: J96.00  ICD-9-CM: 518.81  10/14/2017 Unknown        * (Principal)Acute on chronic respiratory failure ([facility] ICD-10-CM: J96.20  ICD-9-CM: 518.84  10/14/2017 Unknown              Vital Signs:    Last 24hrs VS reviewed since prior progress note.  Most recent are:  Visit Vitals    BP (!) 126/98    Pulse 100    Temp 97.7 °F (36.5 °C)    Resp 18    Ht 5' 7\" (1.702 m)    Wt (!) 186 kg (410 lb)    SpO2 90%    BMI 64.22 kg/m2         Intake/Output Summary (Last 24 hours) at 10/17/17 0941  Last data filed at 10/16/17 1936   Gross per 24 hour   Intake              700 ml   Output              300 ml   Net              400 ml        Physical Examination:     Gen: NAD, non-toxic, extremely obese  HEENT: anicteric sclerae, normal conjunctiva, MM moist  Neck: supple, thick  Heart: RRR, distant heart sounds, no MRG appreciated, unable to assess JVD, 1+ BLE edema up to the knees  Lungs: b/l wheezing, feint crackles, breathing becomes labored with minimal movement  Abd: soft, NT, ND, BS+, unable to assess organomegaly  Extr: warm  Skin: LE wounds wrapped  Neuro: CN II-XII grossly intact, normal speech, moves all extremities  Psych: normal mood, appropriate affect       Data Review:    Review and/or order of clinical lab test      Labs:     Recent Labs      10/17/17   0348  10/16/17   0345   WBC  5.2  5.3   HGB  12.1  12.0   HCT  38.8  37.8   PLT  146*  151     Recent Labs      10/17/17   0348  10/16/17   0345  10/15/17   0353   NA  138  140  140   K  4.1  3.5  2.9*   CL  91*  91*  92*   CO2  39*  38*  38*   BUN  68*  56*  50*   CREA  2.79*  1.98*  1.64*   GLU  144*  137*  125*   CA  8.6  8.5  8.9     Recent Labs      10/17/17   0348  10/15/17   0352 10/14/17   1509   SGOT  17  19  37   ALT  27  29  34   AP  35*  32*  32*   TBILI  2.9*  3.7*  4.6*   TP  6.7  6.4  6.9   ALB  3.1*  3.0*  3.2*   GLOB  3.6  3.4  3.7     No results for input(s): INR, PTP, APTT in the last 72 hours. No lab exists for component: INREXT, INREXT   No results for input(s): FE, TIBC, PSAT, FERR in the last 72 hours. No results found for: FOL, RBCF   No results for input(s): PH, PCO2, PO2 in the last 72 hours.   Recent Labs      10/14/17   1509   TROIQ  0.07*     No results found for: CHOL, CHOLX, CHLST, CHOLV, HDL, LDL, LDLC, DLDLP, TGLX, TRIGL, TRIGP, CHHD, CHHDX  Lab Results   Component Value Date/Time    Glucose (POC) 124 09/15/2017 09:56 PM     No results found for: COLOR, APPRN, SPGRU, REFSG, KARISSA, PROTU, GLUCU, KETU, BILU, UROU, CONSUELO, LEUKU, GLUKE, EPSU, BACTU, WBCU, RBCU, CASTS, UCRY      Medications Reviewed:     Current Facility-Administered Medications   Medication Dose Route Frequency    calcium carbonate (TUMS) chewable tablet 200 mg [elemental]  200 mg Oral PRN    influenza vaccine 2017-18 (3 yrs+)(PF) (FLUZONE QUAD/FLUARIX QUAD) injection 0.5 mL  0.5 mL IntraMUSCular PRIOR TO DISCHARGE    albuterol-ipratropium (DUO-NEB) 2.5 MG-0.5 MG/3 ML  3 mL Nebulization Q4H PRN    aspirin delayed-release tablet 81 mg  81 mg Oral DAILY    budesonide (PULMICORT) 500 mcg/2 ml nebulizer suspension  500 mcg Nebulization BID RT    carvedilol (COREG) tablet 6.25 mg  6.25 mg Oral BID WITH MEALS    fenofibrate nanocrystallized (TRICOR) tablet 48 mg  48 mg Oral DAILY    hydrALAZINE (APRESOLINE) tablet 25 mg  25 mg Oral TID    isosorbide dinitrate (ISORDIL) tablet 10 mg  10 mg Oral TID    sertraline (ZOLOFT) tablet 100 mg  100 mg Oral DAILY    methylPREDNISolone (PF) (SOLU-MEDROL) injection 40 mg  40 mg IntraVENous Q8H    sodium chloride (NS) flush 5-10 mL  5-10 mL IntraVENous Q8H    sodium chloride (NS) flush 5-10 mL  5-10 mL IntraVENous PRN    ondansetron (ZOFRAN) injection 4 mg 4 mg IntraVENous Q4H PRN    heparin (porcine) injection 5,000 Units  5,000 Units SubCUTAneous Q8H    senna-docusate (PERICOLACE) 8.6-50 mg per tablet 1 Tab  1 Tab Oral QHS    polyethylene glycol (MIRALAX) packet 17 g  17 g Oral DAILY    levoFLOXacin (LEVAQUIN) 500 mg in D5W IVPB  500 mg IntraVENous Q24H     ______________________________________________________________________  EXPECTED LENGTH OF STAY: 4d 12h  ACTUAL LENGTH OF STAY:          3                 Migel Zuleta MD

## 2017-10-17 NOTE — PROGRESS NOTES
Bedside shift change report given to Destiney Rosen RN (oncoming nurse) by Rodney Lea RN (offgoing nurse). Report included the following information SBAR, Kardex, Intake/Output, Recent Results, Med Rec Status and Cardiac Rhythm paced. Opportunity for questions provided. Q1 hour rounds throughout shift.

## 2017-10-17 NOTE — CARDIO/PULMONARY
Cardiac Wellness: Heart Failure education folder with low sodium book given to Elenovalarie Tyrone. Begun education with importance of low sodium diet (less than 1500 mg. daily). Discussed how sodium impacts her heart failure. Pt is requesting her respiratory therapist. Nursing advised.

## 2017-10-18 NOTE — PROGRESS NOTES
Problem: Pressure Injury - Risk of  Goal: *Prevention of pressure ulcer  Outcome: Progressing Towards Goal  Patient on turn team Q2 hours, Pressure reduction mattress, repositioned patient as needed. Skin kept dry and free from moisture. Absorbent pads.

## 2017-10-18 NOTE — PROGRESS NOTES
Met with [facility] admission director from [facility] and they have accepted the patient and received authorization. Updated Dr. Bright Yost and she is not medically stable for discharge at this point secondary to renal function. Will continue current treatment and reassess tomorrow.     Adela Beach RN CRM

## 2017-10-18 NOTE — PROGRESS NOTES
Spiritual Care Assessment/Progress Notes    Byron Herrera [id number]  [id number]    1955  58 y.o.  female    Patient Telephone Number: [phone] (home)   Oriental orthodox Affiliation: Other   Language: English   Extended Emergency Contact Information  Primary Emergency Contact: [address]any [address] Phone: [phone]  Relation: Sister  Secondary Emergency Contact: Desi Tolbert  [place] Phone: [phone]  Relation: Other Relative   Patient Active Problem List    Diagnosis Date Noted    Acute respiratory failure ([facility] 10/14/2017    Acute on chronic respiratory failure ([facility] 10/14/2017    SOB (shortness of breath) 09/14/2017        Date: 10/18/2017       Level of Oriental orthodox/Spiritual Activity:  []         Involved in tamia tradition/spiritual practice    []         Not involved in tamia tradition/spiritual practice  [x]         Spiritually oriented    []         Claims no spiritual orientation    []         seeking spiritual identity  []         Feels alienated from Zoroastrian practice/tradition  []         Feels angry about Zoroastrian practice/tradition  []         Spirituality/Zoroastrian tradition a resource for coping at this time.   []         Not able to assess due to medical condition    Services Provided Today:  []         crisis intervention    []         reading Scriptures  [x]         spiritual assessment    []         prayer  [x]         empathic listening/emotional support  []         rites and rituals (cite in comments)  []         life review     []         Zoroastrian support  []         theological development   []         advocacy  []         ethical dialog     []         blessing  []         bereavement support    []         support to family  []         anticipatory grief support   []         help with AMD  []         spiritual guidance    []         meditation      Spiritual Care Needs  [x]         Emotional Support  []         Spiritual/Oriental orthodox Care  [] Loss/Adjustment  []         Advocacy/Referral                /Ethics  []         No needs expressed at               this time  []         Other: (note in               comments)  Spiritual Care Plan  []         Follow up visits with               pt/family  []         Provide materials  []         Schedule sacraments  []         Contact Community               Clergy  []         Follow up as needed  [x]         Other: (note in               comments)     Comments: Visited with Ms. Tolbert with Palliative Dr Willean Shone. Pt had just finished working with therapy - unable to remove bi-pap for our conversation today. Pt became tearful as she shared some difficult dynamics at home. Emotional support offered. Did not have an opportunity today to explore pt's spirituality, however pt appeared open to 's presence and support. Dr Willean Shone spoke with Ms. Carolee Arteaga about completing an AMD - she shared with us that she would trust her sister and her daughter-in-law with medical decisions.   We plan to visit pt again tomorrow to further discuss pt's interest in completing an AMD.    Mela Daniels, Palliative

## 2017-10-18 NOTE — PROGRESS NOTES
Hospitalist Progress Note        Date of Service:  10/18/2017  NAME:  Alannah Ballard  :  1955  MRN:  [id number]      Admission Summary: This is a 61-year-old  female with past medical history significant for chronic respiratory failure, home oxygen dependent 4 liters per minute via nasal cannula, COPD, morbid obesity, she uses Trilogy at home, hypertension, chronic systolic congestive heart failure, status post AICD, chronic kidney disease, who presented to [facility] Emergency Department with progressive shortness of breath one day ago. Interval history / Subjective:   Breathing better today, denies pain, no n/v, no fever/chills. Assessment & Plan:     Acute on chronic hypercapnic respiratory failure: multifactorial due to TEAGAN/OHS, COPD, and CHF  -BiPAP weaned from continuous to qhs and prn, continue weaning O2 to home settings  -continue duonebs and steroids; change the latter to PO      Acute on chronic systolic CHF, NYHA class II at baseline, IV on admission, s/p AICD. -CXR showed pulmonary edema  -ECHO c/w EF 25% with severe hypokinesis on 2017  -continue coreg, hydralazine, isosorbide dinitrate  -holding Aldactone and morning Lasix dose due to significant rise in Cr  -she is not on ACE inhibitor or ARB due to CKD. -monitor I/O's, daily weight  -consulted cardiology      COPD with acute exacerbation.   -continue bronchodilators, steroids, BiPAP     SANTOS on CKD stage III: suspected due to diuresis.  She was also hypotensive on 10/16 which may have led to ATN  -Cr rising despite holding diuretics  -check renal ultrasound  -consult nephrology     Hypokalemia  -resolved; monitor     HTN  -continue Coreg, hydralazine, isosorbide dinitrate  -holding Aldactone as above     Hx of depression  -continue Zoloft     Morbid obesity     Legs ulcers, chronic venous stasis  -continue local wound care    Code status: Full Code  DVT prophylaxis: sq heparin    Care Plan discussed with: Patient/Family, Nurse and   Disposition: TBD     Hospital Problems  Date Reviewed: 10/14/2017          Codes Class Noted POA    Acute respiratory failure ([facility] ICD-10-CM: J96.00  ICD-9-CM: 518.81  10/14/2017 Unknown        * (Principal)Acute on chronic respiratory failure ([facility] ICD-10-CM: J96.20  ICD-9-CM: 518.84  10/14/2017 Unknown              Vital Signs:    Last 24hrs VS reviewed since prior progress note.  Most recent are:  Visit Vitals    /79 (BP 1 Location: Right arm, BP Patient Position: At rest)    Pulse 100    Temp 97.5 °F (36.4 °C)    Resp 22    Ht 5' 7\" (1.702 m)    Wt (!) 187 kg (412 lb 4.2 oz)    SpO2 100%    BMI 64.57 kg/m2         Intake/Output Summary (Last 24 hours) at 10/18/17 1047  Last data filed at 10/18/17 0410   Gross per 24 hour   Intake              560 ml   Output              125 ml   Net              435 ml        Physical Examination:     Gen: NAD, non-toxic, extremely obese  HEENT: anicteric sclerae, normal conjunctiva, MM moist  Neck: supple, thick  Heart: RRR, distant heart sounds, no MRG appreciated, unable to assess JVD, trace BLE edema  Lungs: diminished breath sounds globally, normal work of breathing  Abd: soft, NT, ND, BS+, unable to assess organomegaly  Extr: warm  Skin: LE superficial skin wounds dry, chronic venous stasis dermatitis of the lower extremities  Neuro: CN II-XII grossly intact, normal speech, moves all extremities  Psych: normal mood, appropriate affect       Data Review:    Review and/or order of clinical lab test      Labs:     Recent Labs      10/18/17   0349  10/17/17   0348   WBC  6.1  5.2   HGB  12.9  12.1   HCT  39.6  38.8   PLT  143*  146*     Recent Labs      10/18/17   0349  10/17/17   0348  10/16/17   0345   NA  133*  138  140   K  4.6  4.1  3.5   CL  87*  91*  91*   CO2  36*  39*  38*   BUN  85*  68*  56*   CREA  3.28*  2.79*  1.98*   GLU  139*  144*  137*   CA  8.7  8.6 8.5   MG  2.3   --    --    PHOS  4.5   --    --      Recent Labs      10/17/17   0348   SGOT  17   ALT  27   AP  35*   TBILI  2.9*   TP  6.7   ALB  3.1*   GLOB  3.6     No results for input(s): INR, PTP, APTT in the last 72 hours. No lab exists for component: INREXT, INREXT   No results for input(s): FE, TIBC, PSAT, FERR in the last 72 hours. No results found for: FOL, RBCF   No results for input(s): PH, PCO2, PO2 in the last 72 hours. No results for input(s): CPK, CKNDX, TROIQ in the last 72 hours.     No lab exists for component: CPKMB  No results found for: CHOL, CHOLX, CHLST, CHOLV, HDL, LDL, LDLC, DLDLP, TGLX, TRIGL, TRIGP, CHHD, CHHDX  Lab Results   Component Value Date/Time    Glucose (POC) 124 09/15/2017 09:56 PM     No results found for: COLOR, APPRN, SPGRU, REFSG, KARISSA, PROTU, GLUCU, KETU, BILU, UROU, CONSUELO, LEUKU, GLUKE, EPSU, BACTU, WBCU, RBCU, CASTS, UCRY      Medications Reviewed:     Current Facility-Administered Medications   Medication Dose Route Frequency    0.9% sodium chloride infusion  50 mL/hr IntraVENous CONTINUOUS    albuterol-ipratropium (DUO-NEB) 2.5 MG-0.5 MG/3 ML  3 mL Nebulization BID RT    methylPREDNISolone (PF) (SOLU-MEDROL) injection 40 mg  40 mg IntraVENous Q12H    levoFLOXacin (LEVAQUIN) tablet 250 mg  250 mg Oral Q24H    calcium carbonate (TUMS) chewable tablet 200 mg [elemental]  200 mg Oral PRN    influenza vaccine 2017-18 (3 yrs+)(PF) (FLUZONE QUAD/FLUARIX QUAD) injection 0.5 mL  0.5 mL IntraMUSCular PRIOR TO DISCHARGE    albuterol-ipratropium (DUO-NEB) 2.5 MG-0.5 MG/3 ML  3 mL Nebulization Q4H PRN    aspirin delayed-release tablet 81 mg  81 mg Oral DAILY    budesonide (PULMICORT) 500 mcg/2 ml nebulizer suspension  500 mcg Nebulization BID RT    carvedilol (COREG) tablet 6.25 mg  6.25 mg Oral BID WITH MEALS    fenofibrate nanocrystallized (TRICOR) tablet 48 mg  48 mg Oral DAILY    hydrALAZINE (APRESOLINE) tablet 25 mg  25 mg Oral TID    isosorbide dinitrate (ISORDIL) tablet 10 mg  10 mg Oral TID    sertraline (ZOLOFT) tablet 100 mg  100 mg Oral DAILY    sodium chloride (NS) flush 5-10 mL  5-10 mL IntraVENous Q8H    sodium chloride (NS) flush 5-10 mL  5-10 mL IntraVENous PRN    ondansetron (ZOFRAN) injection 4 mg  4 mg IntraVENous Q4H PRN    heparin (porcine) injection 5,000 Units  5,000 Units SubCUTAneous Q8H    senna-docusate (PERICOLACE) 8.6-50 mg per tablet 1 Tab  1 Tab Oral QHS    polyethylene glycol (MIRALAX) packet 17 g  17 g Oral DAILY     ______________________________________________________________________  EXPECTED LENGTH OF STAY: 4d 12h  ACTUAL LENGTH OF STAY:          4                 Carl Torrez MD

## 2017-10-18 NOTE — PROGRESS NOTES
ADULT PROTOCOL: JET AEROSOL  REASSESSMENT    Patient  Alvino Ramp     58 y.o.   female     10/18/2017  10:00 AM    Breath Sounds Pre Procedure: Right Breath Sounds: Diminished                               Left Breath Sounds: Diminished    Breath Sounds Post Procedure: Right Breath Sounds: Diminished                                 Left Breath Sounds: Diminished    Breathing pattern: Pre procedure Breathing Pattern: Regular          Post procedure Breathing Pattern: Regular    Heart Rate: Pre procedure Pulse: 100           Post procedure Pulse: 100    Resp Rate: Pre procedure Respirations: 18           Post procedure Respirations: 18    Peak Flow: Pre bronchodilator             Post bronchodilator       FVC/FEV1:      Incentive Spirometry:             Cough: Pre procedure Cough: Non-productive               Post procedure Cough: Non-productive    Suctioned: NO    Sputum: Pre procedure                   Post procedure      Oxygen: O2 Device: Nasal cannula   Flow rate (L/min) 3     Changed: NO    SpO2: Pre procedure SpO2: 91 %   with oxygen              Post procedure SpO2: 90 %  with oxygen    Nebulizer Therapy: Current medications Aerosolized Medications: DuoNeb, Pulmicort      Changed: YES BID    Smoking History:     Problem List:   Patient Active Problem List   Diagnosis Code    SOB (shortness of breath) R06.02    Acute respiratory failure (HCC) J96.00    Acute on chronic respiratory failure (HCC) J96.20       Respiratory Therapist: Licha Spicer RT

## 2017-10-18 NOTE — PROGRESS NOTES
Bedside shift change report given to [facility] RN (oncoming nurse) by Kalee Montes RN (offgoing nurse). Report included the following information SBAR, Kardex, ED Summary, Procedure Summary, Intake/Output, MAR and Recent Results     Hourly rounds completed throughout shift.

## 2017-10-18 NOTE — PROGRESS NOTES
Problem: Mobility Impaired (Adult and Pediatric)  Goal: *Acute Goals and Plan of Care (Insert Text)  Physical Therapy Goals  Initiated 10/16/2017  1. Patient will move from supine to sit and sit to supine , scoot up and down and roll side to side in bed with modified independence within 7 day(s). 2.  Patient will transfer from bed to chair and chair to bed with modified independence using the least restrictive device within 7 day(s). 3.  Patient will perform sit to stand with modified independence within 7 day(s). 4.  Patient will ambulate with modified independence for 15 feet with the least restrictive device within 7 day(s). Stair goal to be set prn   physical Therapy TREATMENT  Patient: Murali Martin (80 y.o. female)  Date: 10/18/2017  Diagnosis: Acute respiratory failure (HCC)  Acute on chronic respiratory failure (HCC) Acute on chronic respiratory failure (HCC)       Precautions: Fall, Skin (strict I&O; 4L O2 PTA; Trilogy at home; EF 25%)  Chart, physical therapy assessment, plan of care and goals were reviewed. ASSESSMENT:  Pt received supine in bed w/ HOB moderately elevated, also eating lunch (chips). Pt agreeable to PT. Pt continues to require Max A x2 fro bed mobility and Mod-Max Ax2 sit<>Stand transfers and transfer to to chair although pt able to somehwat assist w/ mobility. Pt continues to stop frequently during transfers (bed mobility and transfer bed<>chair), wanting therapy to \"let go\" during assist. Pt able to transfer to chair, demonstrated very poor eccentric control w/ sitting (\"flopping\"/ sitting unannounced while therapy trying to stabilize chair). Once sitting (PLB, pt BELL), noted pt HR severely decreasing to 43 bpm and O2 desat to 80's while on NC  O2. RN notified immediately as pt HR fluctuating between 40's and 50's (HR in 100's at rest and during activity). Pt denied SOB. Pt able to report between PLB that \"when BiPAP is placed back on it (HR) will get better\".  NSG present, pt returned to bed w/ Ax3 (Ax1 at shoulders and x2 at LE's), BiPAP on a this time. Assisted pt w/ boost to [facility] (Ax3). Pt HR slowly increasing, taking approx 5 minutes to increase to 62 then to 72 bpm. Pt with all needs in reach. Progression toward goals:  []      Improving appropriately and progressing toward goals  [x]      Improving slowly and progressing toward goals  []      Not making progress toward goals and plan of care will be adjusted     PLAN:  Patient continues to benefit from skilled intervention to address the above impairments. Continue treatment per established plan of care. Discharge Recommendations:  Rehab  Further Equipment Recommendations for Discharge:  TBD     SUBJECTIVE:   Patient stated Let me go. Let me go.  When assisting pt back to bed, RN assisting w/line management, therapy assisting pt back to bed. OBJECTIVE DATA SUMMARY:   Critical Behavior:  Neurologic State: Alert  Orientation Level: Oriented X4  Cognition: Appropriate for age attention/concentration, Appropriate safety awareness, Follows commands  Safety/Judgement: Fall prevention, Home safety  Functional Mobility Training:  Bed Mobility:     Supine to Sit: Maximum assistance; Additional time;Assist x2  Sit to Supine: Maximum assistance; Additional time;Assist x2  Scooting: Moderate assistance;Maximum assistance;Assist x2         Transfers:  Sit to Stand: Maximum assistance; Additional time;Assist x2           Bed to Chair: Maximum assistance; Adaptive equipment; Additional time;Assist x2                    Balance:  Sitting: Impaired (shift to right )  Standing: Impaired; With support  Ambulation/Gait Training:                                                                 Therapeutic Exercises:   PLB  Pain:  Pain Scale 1: Numeric (0 - 10)  Pain Intensity 1: 0              Activity Tolerance:   Limited  Please refer to the flowsheet for vital signs taken during this treatment.   After treatment:   [] Patient left in no apparent distress sitting up in chair  [x] Patient left in no apparent distress in bed  [x] Call bell left within reach  [x] Nursing notified  [] Caregiver present  [] Bed alarm activated    COMMUNICATION/COLLABORATION:   The patients plan of care was discussed with: Registered Nurse Janiya/Heather [facility]att [facility]   Time Calculation: 36 mins

## 2017-10-18 NOTE — CONSULTS
NEPHROLOGY CONSULT NOTE     Patient: Autumn Gan MRN: [id number]  PCP: Bernadine Koehler MD   :     1955  Age:   58 y.o. Sex:  female      Referring physician: Sarah Chau MD  Reason for consultation: 58 y.o. female with Acute respiratory failure ([facility]  Acute on chronic respiratory failure ([facility] complicated by SANTOS   Admission Date: 10/14/2017  2:46 PM  LOS: 4 days      ASSESSMENT and PLAN :   SANTOS on CKD:  - diff includes IVVD from diuresis vs ATN vs CRS  - renal U/S neg for obstruction  - agree with holding diuretics for today  - bolus with albumin x 4 doses over the next 24 hours  - if no improvement, would consider adding inotropes    CKD III:  - baseline Cr around 1.5  - from chronic HTN    HTN:  - BP actually low  - hold BP meds  - attempt to keep SBP > 110 to help with renal perfusion    Resp Failure:  - multifactorial including TEAGAN, obesity hypoventilation, pulm edema  - on BiPAP now intermittently     Systolic HF with EF 10% with AICD:  - holding diuretics  - may need inotropes if no improvement with hydration    COPD exacerbation:  - on nebs, steroids and levaquin    Morbid Obesity    TEAGAN:  - on BiPAP here     Active Problems / Assessment AAActive  :   Principal Problem:    Acute on chronic respiratory failure ([facility] (10/14/2017)    Active Problems:    Acute respiratory failure ([facility] (10/14/2017)         Subjective:   HPI: Autumn Gan is a 58 y.o.  female who has been admitted to the hospital for worsening SOB and wt gain. She has a hx of CKD III, baseline Cr around 1.5, does not follow with nephrology, hx of chronic systolic HF with EF 66%, AICD in place, morbid obesity, COPD, TEAGAN, and HTN. She was found to be in acute HF and was diuresed with IV bumex. Her Cr was 1.6 on admission and progressively oscar to 3.3 today. Her diuretics were stopped yesterday with a rising Cr and hypotension. Her UOP has not been accurately recorded, and she now has a garner.   She has lost about 7 lbs since admission here on 10/14. Renal U/S from today was neg for hydronephrosis. She is on BiPAP now but feels better since arrival.  No cp, increase in SOB, n/v/d. Past Medical Hx:   Past Medical History:   Diagnosis Date    Chronic obstructive pulmonary disease ([facility]     Heart failure ([facility]     Hypertension     Obesity         Past Surgical Hx:     Past Surgical History:   Procedure Laterality Date    HX PACEMAKER         Medications:  Prior to Admission medications    Medication Sig Start Date End Date Taking? Authorizing Provider   bumetanide (BUMEX) 1 mg tablet Take 1 Tab by mouth two (2) times a day. 9/20/17  Yes Jeremías Reyes PA-C   carvedilol (COREG) 6.25 mg tablet Take 1 Tab by mouth two (2) times daily (with meals). 9/20/17  Yes Jeremías Reyes PA-C   hydrALAZINE (APRESOLINE) 25 mg tablet Take 1 Tab by mouth three (3) times daily. 9/20/17  Yes Jeremías Reyes PA-C   isosorbide dinitrate (ISORDIL) 10 mg tablet Take 1 Tab by mouth three (3) times daily. 9/20/17  Yes Jeremías Reyes PA-C   spironolactone (ALDACTONE) 25 mg tablet Take 1 Tab by mouth daily. 9/20/17  Yes Jeremías Reyes PA-C   albuterol-ipratropium (DUO-NEB) 2.5 mg-0.5 mg/3 ml nebu 3 mL by Nebulization route every four (4) hours as needed. 9/20/17  Yes Lonnie Rao MD   budesonide (PULMICORT) 0.5 mg/2 mL nbsp 2 mL by Nebulization route two (2) times a day. 9/20/17  Yes Lonnie Roa MD   guaiFENesin ER (MUCINEX) 600 mg ER tablet Take 1 Tab by mouth every twelve (12) hours. 9/20/17  Yes Lonnie Roa MD   potassium chloride SR (K-TAB) 20 mEq tablet Take 1 Tab by mouth two (2) times a day. 9/20/17  Yes Lonnie Roa MD   sertraline (ZOLOFT) 100 mg tablet Take 1 Tab by mouth daily. 9/20/17  Yes Lonnie Roa MD   aspirin delayed-release 81 mg tablet Take 81 mg by mouth daily. Yes Phys Other, MD   fenofibrate nanocrystallized (TRICOR) 48 mg tablet Take 48 mg by mouth daily.    Yes Historical Provider   predniSONE (DELTASONE) 10 mg tablet Take 4 tabs daily X 3 days, then Take 3 tabs X 3 days, then 2 tabs X 3 days, then 1 tab X 3 days. 9/20/17   Amina Chris MD       No Known Allergies    Social Hx:  reports that she has quit smoking. She has never used smokeless tobacco. She reports that she does not drink alcohol. History reviewed. No pertinent family history. Review of Systems:  A twelve point review of system was performed today. Pertinent positives and negatives are mentioned in the HPI. The reminder of the ROS is negative and noncontributory. Objective:    Vitals:    Vitals:    10/18/17 0310 10/18/17 0718 10/18/17 0806 10/18/17 1119   BP: 112/82 125/79  99/55   Pulse: 89 100  100   Resp: 21 22  19   Temp: 97.7 °F (36.5 °C) 97.5 °F (36.4 °C)  97.5 °F (36.4 °C)   SpO2: 98% 100% 100% 97%   Weight:       Height:         I&O's:  10/17 0701 - 10/18 0700  In: 760 [P.O.:760]  Out: 125 [Urine:125]  Visit Vitals    BP 99/55 (BP 1 Location: Right arm, BP Patient Position: At rest)    Pulse 100    Temp 97.5 °F (36.4 °C)    Resp 19    Ht 5' 7\" (1.702 m)    Wt (!) 187 kg (412 lb 4.2 oz)    SpO2 97%    BMI 64.57 kg/m2       Physical Exam:  General:morbid obesity, on BiPAP, appears comfortable  HEENT: Eyes are PERRL. Conjunctiva without pallor ,erythema. The sclerae without icterus.  .   Neck:Supple,no mass palpable  Lungs : distant sounds  CVS: RRR, S1 S2 normal, No rub,  chronic LE edema with venous stasis changes  Abdomen: obese, Non tender, No hepatosplenomegaly, bowel sounds present  Extremities: No cyanosis, No clubbing  Skin: No rash or lesions  Neurologic: non focal, AAO x 3  Psych: normal affect    Laboratory Results:    Lab Results   Component Value Date    BUN 85 (H) 10/18/2017     (L) 10/18/2017    K 4.6 10/18/2017    CL 87 (L) 10/18/2017    CO2 36 (H) 10/18/2017       Lab Results   Component Value Date    BUN 85 (H) 10/18/2017    BUN 68 (H) 10/17/2017    BUN 56 (H) 10/16/2017    BUN 50 (H) 10/15/2017    BUN 47 (H) 10/14/2017    K 4.6 10/18/2017    K 4.1 10/17/2017    K 3.5 10/16/2017    K 2.9 (L) 10/15/2017    K 3.9 10/14/2017       Lab Results   Component Value Date    WBC 6.1 10/18/2017    RBC 4.15 10/18/2017    HGB 12.9 10/18/2017    HCT 39.6 10/18/2017    MCV 95.4 10/18/2017    MCH 31.1 10/18/2017    RDW 18.8 (H) 10/18/2017     (L) 10/18/2017       Lab Results   Component Value Date    PHOS 4.5 10/18/2017       Urine dipstick:   No results found for: COLOR, APPRN, SPGRU, REFSG, KARISSA, PROTU, GLUCU, KETU, BILU, UROU, CONSUELO, LEUKU, GLUKE, EPSU, BACTU, WBCU, RBCU, CASTS, UCRY    I have reviewed the following: All pertinent labs, microbiology data, radiology imaging for my assessment       Thank you for allowing us to participate in the care of this patient. We will follow patient.  Please dont hesitate to call with any questions    Ad Dunne MD  10/18/2017    [address] [place] [address]

## 2017-10-18 NOTE — PROGRESS NOTES
Problem: Falls - Risk of  Goal: *Absence of Falls  Document Nas Fall Risk and appropriate interventions in the flowsheet. Outcome: Progressing Towards Goal  Fall Risk Interventions:  Mobility Interventions: Patient to call before getting OOB, Strengthening exercises (ROM-active/passive), Utilize walker, cane, or other assitive device           Medication Interventions: Evaluate medications/consider consulting pharmacy, Patient to call before getting OOB, Teach patient to arise slowly     Elimination Interventions: Call light in reach, Patient to call for help with toileting needs     History of Falls Interventions: Door open when patient unattended, Evaluate medications/consider consulting pharmacy, Investigate reason for fall, Room close to nurse's station           0730 Bedside shift change report given to Adalberto Salguero RN (oncoming nurse) by Richar Sauceda RN (offgoing nurse). Report included the following information SBAR, Kardex, Intake/Output, MAR, Recent Results and Cardiac Rhythm Paced. q1 hour rounding throughout shift.

## 2017-10-18 NOTE — PROGRESS NOTES
Palliative Medicine Consult  [place]: [phone] ([phone])    Patient Name: Amrit Smith  YOB: 1955    Date of Initial Consult: 10-17-17  Reason for Consult: Care decisions   Requesting Provider: Bud/Brandt (primary cardiologist is Dr She Joyner)  Primary Care Physician: Kenton Grossman MD      SUMMARY:   Amrit Smith is a 58 y.o. with a past history of COPD on home Trilogy, morbid obesity,CHF (NYHA II, EF 25% 9/2017), ICD [facility] at [facility], CKD who was admitted on 10/14/2017 from home with worsening SOB due to pulmonry edema, being diuresed but need to monitor renal function- renal consulted. Pt was hospitalized for similar sx in Sept 2017, went to [facility] and then back home where she lives alone. States that her dtr-in-law Eder is main source of support. Current medical issues leading to Palliative Medicine involvement include: care decisions. PALLIATIVE DIAGNOSES:   1. Shortness of breath on BIPAP- COPD and CHF  2. Debility due to breathing, weight, leg ulcers  3. Mult medical issues as outlined above        PLAN:   1. Meet w/ pt along w/  Qi Milian after she worked w/ PT. HR dropped into 40s during that time, but pt states she felt okay. 2. Introduce idea of AMD to pt, as she does not have one. 3. Pt lives w/ her ex-, but he is not a source of physical or emotional support. Feels as though repeat hospitalizations might not happen if she had better care at home. Tearful about this. 4. Would benefit from Palliative program at home, however lives ~60 miles away from [facility] and would not qualify. Uncertain if there are options through another health system for home based primary care or other possibilities. 5. Her sister [place] and dtr-in-law Desi support her, and as her ex- and she are not legally / think at least the first part of the AMD very important.    6. Will visit pt again tmrw, hopefully at a point in the day where can come off BIPAP for us. 7. Communicated plan of care with: Palliative IDT; [facility]; Cardiology        GOALS OF CARE / TREATMENT PREFERENCES:   [====Goals of Care====]  GOALS OF CARE:  Patient / health care proxy stated goals: improve breathing       TREATMENT PREFERENCES:   Code Status: Full Code    Advance Care Planning:- to be determined   Advance Care Planning 10/15/2017   Patient's Healthcare Decision Maker is: Legal Next of Kin   Confirm Advance Directive None       Other:    The palliative care team has discussed with patient / health care proxy about goals of care / treatment preferences for patient.  [====Goals of Care====]         HISTORY:         HPI/SUBJECTIVE:    The patient is:   [x] Verbal and participatory  [] Non-participatory due to:     Pt in bed w/ BIPAP in place,worked w/ therapies and HR went down for 40s so not going to take off mask. Feels okay. Sat up on EOB.     Clinical Pain Assessment (nonverbal scale for severity on nonverbal patients):   [++++ Clinical Pain Assessment++++]  [++++Pain Severity++++]: Pain: 0  [++++Pain Character++++]:   [++++Pain Duration++++]:   [++++Pain Effect++++]:   [++++Pain Factors++++]:   [++++Pain Frequency++++]:   [++++Pain Location++++]:   [++++ Clinical Pain Assessment++++]  Duration: for how long has pt been experiencing pain (e.g., 2 days, 1 month, years)  Frequency: how often pain is an issue (e.g., several times per day, once every few days, constant)     FUNCTIONAL ASSESSMENT:     Palliative Performance Scale (PPS):  PPS: 50       PSYCHOSOCIAL/SPIRITUAL SCREENING:     Advance Care Planning:  Advance Care Planning 10/15/2017   Patient's Healthcare Decision Maker is: Legal Next of Kin   Confirm Advance Directive None        Any spiritual / Jewish concerns:  [] Yes /  [x] No    Caregiver Burnout:  [] Yes /  [] No /  [x] No Caregiver Present      Anticipatory grief assessment:   [x] Normal  / [] Maladaptive       ESAS Anxiety: Anxiety: 2    ESAS Depression: Depression: 0        REVIEW OF SYSTEMS:     Positive and pertinent negative findings in ROS are noted above in HPI. The following systems were [x] reviewed / [] unable to be reviewed as noted in HPI  Other findings are noted below. Systems: constitutional, ears/nose/mouth/throat, respiratory, gastrointestinal, genitourinary, musculoskeletal, integumentary, neurologic, psychiatric, endocrine. Positive findings noted below. Modified ESAS Completed by: provider   Fatigue: 8 Drowsiness: 0   Depression: 0 Pain: 0   Anxiety: 2 Nausea: 0   Anorexia: 3 Dyspnea: 1     Constipation: No     Stool Occurrence(s): 1        PHYSICAL EXAM:     From RN flowsheet:  Wt Readings from Last 3 Encounters:   10/18/17 (!) 412 lb 4.2 oz (187 kg)   09/20/17 (!) 443 lb 5.5 oz (201.1 kg)     Blood pressure 99/55, pulse 100, temperature 97.5 °F (36.4 °C), resp. rate 19, height 5' 7\" (1.702 m), weight (!) 412 lb 4.2 oz (187 kg), SpO2 97 %. Pain Scale 1: Numeric (0 - 10)  Pain Intensity 1: 0                Constitutional: awake, alert, oriented, engaging   Eyes: pupils equal, anicteric  ENMT: no nasal discharge, moist mucous membranes  Respiratory: breathing not labored on BIPAP  Musculoskeletal: LE w/ swelling and wrapped   Skin: warm, dry  Neurologic: following commands, moving all extremities  Psychiatric: full affect, no hallucinations       HISTORY:     Principal Problem:    Acute on chronic respiratory failure ([address] (10/14/2017)    Active Problems:    Acute respiratory failure ([address] (10/14/2017)      Past Medical History:   Diagnosis Date    Chronic obstructive pulmonary disease ([address]     Heart failure ([address]     Hypertension     Obesity       Past Surgical History:   Procedure Laterality Date    HX PACEMAKER        History reviewed. No pertinent family history. History reviewed, no pertinent family history.   Social History   Substance Use Topics    Smoking status: Former Smoker    Smokeless tobacco: Never Used    Alcohol use No     No Known Allergies   Current Facility-Administered Medications   Medication Dose Route Frequency    albuterol-ipratropium (DUO-NEB) 2.5 MG-0.5 MG/3 ML  3 mL Nebulization BID RT    [START ON 10/19/2017] predniSONE (DELTASONE) tablet 40 mg  40 mg Oral DAILY WITH BREAKFAST    albumin human 5% (BUMINATE) solution 12.5 g  12.5 g IntraVENous Q6H    levoFLOXacin (LEVAQUIN) tablet 250 mg  250 mg Oral Q24H    calcium carbonate (TUMS) chewable tablet 200 mg [elemental]  200 mg Oral PRN    influenza vaccine 2017-18 (3 yrs+)(PF) (FLUZONE QUAD/FLUARIX QUAD) injection 0.5 mL  0.5 mL IntraMUSCular PRIOR TO DISCHARGE    albuterol-ipratropium (DUO-NEB) 2.5 MG-0.5 MG/3 ML  3 mL Nebulization Q4H PRN    aspirin delayed-release tablet 81 mg  81 mg Oral DAILY    budesonide (PULMICORT) 500 mcg/2 ml nebulizer suspension  500 mcg Nebulization BID RT    carvedilol (COREG) tablet 6.25 mg  6.25 mg Oral BID WITH MEALS    fenofibrate nanocrystallized (TRICOR) tablet 48 mg  48 mg Oral DAILY    hydrALAZINE (APRESOLINE) tablet 25 mg  25 mg Oral TID    isosorbide dinitrate (ISORDIL) tablet 10 mg  10 mg Oral TID    sertraline (ZOLOFT) tablet 100 mg  100 mg Oral DAILY    sodium chloride (NS) flush 5-10 mL  5-10 mL IntraVENous Q8H    sodium chloride (NS) flush 5-10 mL  5-10 mL IntraVENous PRN    ondansetron (ZOFRAN) injection 4 mg  4 mg IntraVENous Q4H PRN    heparin (porcine) injection 5,000 Units  5,000 Units SubCUTAneous Q8H    senna-docusate (PERICOLACE) 8.6-50 mg per tablet 1 Tab  1 Tab Oral QHS    polyethylene glycol (MIRALAX) packet 17 g  17 g Oral DAILY          LAB AND IMAGING FINDINGS:     Lab Results   Component Value Date/Time    WBC 6.1 10/18/2017 03:49 AM    HGB 12.9 10/18/2017 03:49 AM    PLATELET 126 77/02/7036 03:49 AM     Lab Results   Component Value Date/Time    Sodium 133 10/18/2017 03:49 AM    Potassium 4.6 10/18/2017 03:49 AM    Chloride 87 10/18/2017 03:49 AM    CO2 36 10/18/2017 03:49 AM BUN 85 10/18/2017 03:49 AM    Creatinine 3.28 10/18/2017 03:49 AM    Calcium 8.7 10/18/2017 03:49 AM    Magnesium 2.3 10/18/2017 03:49 AM    Phosphorus 4.5 10/18/2017 03:49 AM      Lab Results   Component Value Date/Time    AST (SGOT) 17 10/17/2017 03:48 AM    Alk. phosphatase 35 10/17/2017 03:48 AM    Protein, total 6.7 10/17/2017 03:48 AM    Albumin 3.1 10/17/2017 03:48 AM    Globulin 3.6 10/17/2017 03:48 AM     No results found for: INR, PTMR, PTP, PT1, PT2, APTT   No results found for: IRON, FE, TIBC, IBCT, PSAT, FERR   No results found for: PH, PCO2, PO2  No components found for: GLPOC   No results found for: CPK, CKMB             Total time: 25 min   Counseling / coordination time, spent as noted above: 15 min   > 50% counseling / coordination?: yes    Prolonged service was provided for  []30 min   []75 min in face to face time in the presence of the patient, spent as noted above. Time Start:   Time End:   Note: this can only be billed with 81491 (initial) or 95846 (follow up). If multiple start / stop times, list each separately.

## 2017-10-18 NOTE — PROGRESS NOTES
Cardiology Progress Note            Admit Date: 10/14/2017  Admit Diagnosis: Acute respiratory failure ([facility]  Acute on chronic respiratory failure ([facility]  Date: 10/18/2017     Time: 3:20 PM     Subjective:  Back on BIPAP for SOB. Renal US today w/o obstruction. NSG reports HR dipped to 40s. Patient awake and chatting during this time. Patient with PPM/ICD, suspect monitor was no adequately reading HR. Assessment and Plan     1: CHF, systolic chronic                        - NYHA class II -III: difficult to assess with hypoxia related to COPD exacerbation.                         - EF 25% on echo 9/2017                        - Coreg 6.25 mg BID                        - No ACE/ARB 2/2 CKD                        - Hydralazine 25 mg TID                        - Isordil 10 mg PO TID                        - Diuretics stopped                        - S/p AICD at [facility]      2: Cardiomyopathy with LVEF 25%                        - AICD in place - wide complex - Perhaps BiV in future                        - GDT meds on board, Aldactone held  3: COPD.                       - Per Primary team                        - Trilogy for home, BIPAP here                                  - She will need an appropriately fitting mask  4. A/C hypercapnic hypoxic respiratory failure                                  - BIPAP prn                                  - Trilogy at home, needs new mask. Will Consult CM to have [address] bring a few masks for fitting once discharged home  5: SANTOS on CRF, presumably diuretics                        - Seem like baseline 1.5-1.8                                  - Cr. 3.28              - Nephrology following -             - Albumin running x 4 over 24h             - Inotropes if no improvement  6.  HTN             - hypotension currently             - Will stop Hydralazine and Isordil - would like to keep BP a little higher to help with renal perfusion. 6. Body mass index is 69.44 kg/(m^2).                       Morbidly obese.  Diet and exercise discussed      Cr bumped up. Nephrology following. Patient getting Albumin x4 over 24 hours. Diuretics on hold and will stop Hydralazine and Isordil to keep BP up to help perfuse kidneys. If no improvement, will need to start Inotropes. Renal US without obstruction. Assessment/Plan/Discussion:Cardiology Attending:     Patient seen earlier today and examined  and agree with Advance Practice Provider (ENID, NP,PA)  assessment and plans. Amrit Smith is a 58 y.o. female   CHF with renal dysfx  Monitor bp for consideration of inotropes    Connie Urbina MD 10/18/2017              ROS:  A comprehensive review of systems was negative except for that written in the HPI. Objective:      Physical Exam:                Visit Vitals    BP 99/59 (BP 1 Location: Right arm, BP Patient Position: At rest)    Pulse 70    Temp 97.5 °F (36.4 °C)    Resp 22    Ht 5' 7\" (1.702 m)    Wt (!) 412 lb 4.2 oz (187 kg)    SpO2 91%    BMI 64.57 kg/m2        General Appearance:   Well developed, well nourished,alert and oriented x 3, and   individual in no acute distress. Ears/Nose/Mouth/Throat:    Hearing grossly normal.         Neck:  Supple. Chest:    Lungs clear to auscultation bilaterally. Cardiovascular:   Regular rate and rhythm, S1, S2 normal, no murmur. Abdomen:    Soft, non-tender, bowel sounds are active. Extremities:  No edema bilaterally. Skin:  Warm and dry. Telemetry: normal sinus rhythm, paced.           Data Review:    Labs:    Recent Results (from the past 24 hour(s))   TSH 3RD GENERATION    Collection Time: 10/17/17  4:37 PM   Result Value Ref Range    TSH 0.51 0.36 - 3.74 uIU/mL   NT-PRO BNP    Collection Time: 10/17/17  4:37 PM   Result Value Ref Range    NT pro-BNP >41078 (H) 0 - 125 PG/ML   CBC W/O DIFF    Collection Time: 10/18/17  3:49 AM   Result Value Ref Range    WBC 6.1 3.6 - 11.0 K/uL    RBC 4.15 3.80 - 5.20 M/uL    HGB 12.9 11.5 - 16.0 g/dL    HCT 39.6 35.0 - 47.0 %    MCV 95.4 80.0 - 99.0 FL    MCH 31.1 26.0 - 34.0 PG    MCHC 32.6 30.0 - 36.5 g/dL    RDW 18.8 (H) 11.5 - 14.5 %    PLATELET 312 (L) 127 - 219 K/uL   METABOLIC PANEL, BASIC    Collection Time: 10/18/17  3:49 AM   Result Value Ref Range    Sodium 133 (L) 136 - 145 mmol/L    Potassium 4.6 3.5 - 5.1 mmol/L    Chloride 87 (L) 97 - 108 mmol/L    CO2 36 (H) 21 - 32 mmol/L    Anion gap 10 5 - 15 mmol/L    Glucose 139 (H) 65 - 100 mg/dL    BUN 85 (H) 6 - 20 MG/DL    Creatinine 3.28 (H) 0.55 - 1.02 MG/DL    BUN/Creatinine ratio 26 (H) 12 - 20      GFR est AA 17 (L) >60 ml/min/1.73m2    GFR est non-AA 14 (L) >60 ml/min/1.73m2    Calcium 8.7 8.5 - 10.1 MG/DL   MAGNESIUM    Collection Time: 10/18/17  3:49 AM   Result Value Ref Range    Magnesium 2.3 1.6 - 2.4 mg/dL   PHOSPHORUS    Collection Time: 10/18/17  3:49 AM   Result Value Ref Range    Phosphorus 4.5 2.6 - 4.7 MG/DL   NUCLEATED RBC    Collection Time: 10/18/17  3:49 AM   Result Value Ref Range    NRBC 9.9 (H) 0  WBC    ABSOLUTE NRBC 0.61 (H) 0.00 - 0.01 K/uL    WBC CORRECTED FOR NR ADJUSTED FOR NUCLEATED RBC'S            Radiology:        Current Facility-Administered Medications   Medication Dose Route Frequency    albuterol-ipratropium (DUO-NEB) 2.5 MG-0.5 MG/3 ML  3 mL Nebulization BID RT    [START ON 10/19/2017] predniSONE (DELTASONE) tablet 40 mg  40 mg Oral DAILY WITH BREAKFAST    albumin human 5% (BUMINATE) solution 12.5 g  12.5 g IntraVENous Q6H    levoFLOXacin (LEVAQUIN) tablet 250 mg  250 mg Oral Q24H    calcium carbonate (TUMS) chewable tablet 200 mg [elemental]  200 mg Oral PRN    influenza vaccine 2017-18 (3 yrs+)(PF) (FLUZONE QUAD/FLUARIX QUAD) injection 0.5 mL  0.5 mL IntraMUSCular PRIOR TO DISCHARGE    albuterol-ipratropium (DUO-NEB) 2.5 MG-0.5 MG/3 ML  3 mL Nebulization Q4H PRN    aspirin delayed-release tablet 81 mg  81 mg Oral DAILY    budesonide (PULMICORT) 500 mcg/2 ml nebulizer suspension  500 mcg Nebulization BID RT    carvedilol (COREG) tablet 6.25 mg  6.25 mg Oral BID WITH MEALS    fenofibrate nanocrystallized (TRICOR) tablet 48 mg  48 mg Oral DAILY    hydrALAZINE (APRESOLINE) tablet 25 mg  25 mg Oral TID    isosorbide dinitrate (ISORDIL) tablet 10 mg  10 mg Oral TID    sertraline (ZOLOFT) tablet 100 mg  100 mg Oral DAILY    sodium chloride (NS) flush 5-10 mL  5-10 mL IntraVENous Q8H    sodium chloride (NS) flush 5-10 mL  5-10 mL IntraVENous PRN    ondansetron (ZOFRAN) injection 4 mg  4 mg IntraVENous Q4H PRN    heparin (porcine) injection 5,000 Units  5,000 Units SubCUTAneous Q8H    senna-docusate (PERICOLACE) 8.6-50 mg per tablet 1 Tab  1 Tab Oral QHS    polyethylene glycol (MIRALAX) packet 17 g  17 g Oral DAILY          ADELINA Atkins MD       [facility] [address]   Nadiya Campbell   [phone]

## 2017-10-19 NOTE — PROGRESS NOTES
Problem: Falls - Risk of  Goal: *Absence of Falls  Document Nas Fall Risk and appropriate interventions in the flowsheet. Outcome: Progressing Towards Goal  Fall Risk Interventions:  Mobility Interventions: Bed/chair exit alarm, Patient to call before getting OOB, Utilize walker, cane, or other assitive device         Medication Interventions: Bed/chair exit alarm, Patient to call before getting OOB, Teach patient to arise slowly    Elimination Interventions: Call light in reach, Patient to call for help with toileting needs, Toilet paper/wipes in reach    History of Falls Interventions: Door open when patient unattended, Investigate reason for fall, Room close to nurse's station    No falls during shift, Patient bed kept in low/locked position, patient seen by PT, call bell and all personal items kept within reach at all times.

## 2017-10-19 NOTE — PROGRESS NOTES
[facility]   [address] [address], [facility] [address], [facility]  Phone: [phone]   [phone]       Nephrology Progress Note  Stanton Hansen     [birth year]     [id number]  Date of Admission : 10/14/2017  10/19/17    CC: Follow up for SANTOS     Assessment and Plan   SANTOS on CKD :  - Likely has developed ATN now also intra vascularly dry when she had profound bradycardia  - Oligoanuric w/ rapidly declining renal function   - she is a poor candidate for Long term dialysis and would like to avoid dialysis if possible   - however as this is an acute insult, we can consider dialysis only for short period of time     CKD Stage III   - baseline Cr 1.5 mg /dl     Bradycardia   Chronic Systolic CHF w/ EF 08%  - on Milrinone per Cardiology     COPD excaerbation   TEAGAN       Interval History:  SEEN AND examined thios morning   She is on CPAP  She has garner and not much UOP overnight   Profound and sustained bradycardia overnight     Review of Systems: Review of systems not obtained due to patient factors.     Current Medications:   Current Facility-Administered Medications   Medication Dose Route Frequency    milrinone (PRIMACOR) 20 MG/100 ML D5W infusion  0.2 mcg/kg/min IntraVENous CONTINUOUS    albuterol-ipratropium (DUO-NEB) 2.5 MG-0.5 MG/3 ML  3 mL Nebulization BID RT    predniSONE (DELTASONE) tablet 40 mg  40 mg Oral DAILY WITH BREAKFAST    levoFLOXacin (LEVAQUIN) tablet 250 mg  250 mg Oral Q24H    calcium carbonate (TUMS) chewable tablet 200 mg [elemental]  200 mg Oral PRN    influenza vaccine 2017-18 (3 yrs+)(PF) (FLUZONE QUAD/FLUARIX QUAD) injection 0.5 mL  0.5 mL IntraMUSCular PRIOR TO DISCHARGE    albuterol-ipratropium (DUO-NEB) 2.5 MG-0.5 MG/3 ML  3 mL Nebulization Q4H PRN    aspirin delayed-release tablet 81 mg  81 mg Oral DAILY    budesonide (PULMICORT) 500 mcg/2 ml nebulizer suspension  500 mcg Nebulization BID RT    fenofibrate nanocrystallized (TRICOR) tablet 48 mg  48 mg Oral DAILY  sertraline (ZOLOFT) tablet 100 mg  100 mg Oral DAILY    sodium chloride (NS) flush 5-10 mL  5-10 mL IntraVENous Q8H    sodium chloride (NS) flush 5-10 mL  5-10 mL IntraVENous PRN    ondansetron (ZOFRAN) injection 4 mg  4 mg IntraVENous Q4H PRN    heparin (porcine) injection 5,000 Units  5,000 Units SubCUTAneous Q8H    senna-docusate (PERICOLACE) 8.6-50 mg per tablet 1 Tab  1 Tab Oral QHS    polyethylene glycol (MIRALAX) packet 17 g  17 g Oral DAILY      No Known Allergies    Objective:  Vitals:    Vitals:    10/19/17 0400 10/19/17 0716 10/19/17 0746 10/19/17 1137   BP:  100/77  118/77   Pulse:  99  60   Resp:  20  15   Temp:  96.9 °F (36.1 °C)  97.5 °F (36.4 °C)   SpO2: 95% 93% 93% 95%   Weight:       Height:         Intake and Output:     10/17 1901 - 10/19 0700  In: 910 [P.O.:160; I.V.:750]  Out: 125 [Urine:125]    Physical Examination:  General:morbid obesity, on BiPAP, appears comfortable  HEENT: Eyes are PERRL.  Conjunctiva without pallor ,erythema.  The sclerae without icterus. .   Neck:Supple,no mass palpable  Lungs : distant sounds  CVS: RRR, S1 S2 normal, No rub,  chronic LE edema with venous stasis changes  Abdomen: obese, Non tender, No hepatosplenomegaly, bowel sounds present  Extremities: No cyanosis, No clubbing  Skin: No rash or lesions  Neurologic: non focal, AAO x 3  Psych: normal affect       []    High complexity decision making was performed  []    Patient is at high-risk of decompensation with multiple organ involvement    Lab Data Personally Reviewed: I have reviewed all the pertinent labs, microbiology data and radiology studies during assessment.     Recent Labs      10/19/17   0319  10/18/17   0349  10/17/17   0348   NA  133*  133*  138   K  4.9  4.6  4.1   CL  88*  87*  91*   CO2  34*  36*  39*   GLU  132*  139*  144*   BUN  99*  85*  68*   CREA  4.13*  3.28*  2.79*   CA  8.7  8.7  8.6   MG  2.5*  2.3   --    PHOS  6.3*  4.5   --    ALB   --    --   3.1*   SGOT   --    --   17 ALT   --    --   27     Recent Labs      10/19/17   0319  10/18/17   0349  10/17/17   0348   WBC  8.3  6.1  5.2   HGB  12.9  12.9  12.1   HCT  41.1  39.6  38.8   PLT  185  143*  146*     No results found for: SDES  Lab Results   Component Value Date/Time    Culture result: NO GROWTH 5 DAYS 10/14/2017 04:18 PM     Recent Results (from the past 24 hour(s))   PHOSPHORUS    Collection Time: 10/19/17  3:19 AM   Result Value Ref Range    Phosphorus 6.3 (H) 2.6 - 4.7 MG/DL   CBC W/O DIFF    Collection Time: 10/19/17  3:19 AM   Result Value Ref Range    WBC 8.3 3.6 - 11.0 K/uL    RBC 4.34 3.80 - 5.20 M/uL    HGB 12.9 11.5 - 16.0 g/dL    HCT 41.1 35.0 - 47.0 %    MCV 94.7 80.0 - 99.0 FL    MCH 29.7 26.0 - 34.0 PG    MCHC 31.4 30.0 - 36.5 g/dL    RDW 18.9 (H) 11.5 - 14.5 %    PLATELET 979 509 - 648 K/uL   METABOLIC PANEL, BASIC    Collection Time: 10/19/17  3:19 AM   Result Value Ref Range    Sodium 133 (L) 136 - 145 mmol/L    Potassium 4.9 3.5 - 5.1 mmol/L    Chloride 88 (L) 97 - 108 mmol/L    CO2 34 (H) 21 - 32 mmol/L    Anion gap 11 5 - 15 mmol/L    Glucose 132 (H) 65 - 100 mg/dL    BUN 99 (H) 6 - 20 MG/DL    Creatinine 4.13 (H) 0.55 - 1.02 MG/DL    BUN/Creatinine ratio 24 (H) 12 - 20      GFR est AA 13 (L) >60 ml/min/1.73m2    GFR est non-AA 11 (L) >60 ml/min/1.73m2    Calcium 8.7 8.5 - 10.1 MG/DL   MAGNESIUM    Collection Time: 10/19/17  3:19 AM   Result Value Ref Range    Magnesium 2.5 (H) 1.6 - 2.4 mg/dL       I have reviewed the flowsheets. Chart and Pertinent Notes have been reviewed. No change in PMH ,family and social history from Consult note.       Suzan Bueno MD

## 2017-10-19 NOTE — PROGRESS NOTES
Problem: Mobility Impaired (Adult and Pediatric)  Goal: *Acute Goals and Plan of Care (Insert Text)  Physical Therapy Goals  Initiated 10/16/2017  1. Patient will move from supine to sit and sit to supine , scoot up and down and roll side to side in bed with modified independence within 7 day(s). 2.  Patient will transfer from bed to chair and chair to bed with modified independence using the least restrictive device within 7 day(s). 3.  Patient will perform sit to stand with modified independence within 7 day(s). 4.  Patient will ambulate with modified independence for 15 feet with the least restrictive device within 7 day(s). Stair goal to be set prn   physical Therapy TREATMENT  Patient: Byron Herrera (27 y.o. female)  Date: 10/19/2017  Diagnosis: Acute respiratory failure (HCC)  Acute on chronic respiratory failure (HCC) Acute on chronic respiratory failure (HCC)       Precautions: Fall, Skin (strict I&O; 4L O2 PTA; Trilogy at home; EF 25%)    ASSESSMENT:  Pt received in bed on BIPAP resting 02 sats at 94%. She was agreeable to PT. She came to sitting at EOB with max assist X 2 and had some difficulty getting her right hip around to the EOB to get her foot onto the floor. With mod assist and extra time she achieved both feet on the floor. Her 02 sat dropped briefly into the 70s and progressively oscar. They were at 85% in about 30 seconds and at that time pt reported feeling dizzy. Had her perform ankle pumps and BP stable and 02 sats still rising and did get to 91%. After a few minutes pt reported feeling better but reported that she did not feel up to standing today. She did perform 10 reps bilaterally LAQs. She was returned to supine with max assist X 3. Utilized trendelenburg to scoot pt up to the [facility] and she was left with HOB elevated. For OOB to the chair I recommend use of angela lift at this time.    Progression toward goals:  []    Improving appropriately and progressing toward goals  [] Improving slowly and progressing toward goals  [x]    Not making progress toward goals and plan of care will be adjusted     PLAN:  Patient continues to benefit from skilled intervention to address the above impairments. Continue treatment per established plan of care. Discharge Recommendations:  Adarsh Cartwright  Further Equipment Recommendations for Discharge:  none     SUBJECTIVE:   Patient stated I feel dizzy, while sitting at EOB    OBJECTIVE DATA SUMMARY:   Chart checked, pt cleared by nursing  Critical Behavior:  Neurologic State: Alert, Eyes open spontaneously  Orientation Level: Oriented X4  Cognition: Appropriate for age attention/concentration, Follows commands  Safety/Judgement: Fall prevention, Home safety  Functional Mobility Training:  Bed Mobility:     Supine to Sit: Assist x2;Maximum assistance  Sit to Supine: Maximum assistance (X 3)           Transfers:      not assessed this session                             Balance:  Sitting: Impaired  Sitting - Static: Good (unsupported)  Sitting - Dynamic: Fair (occasional)  Ambulation/Gait Training:                                                      n/a  Stairs:            Neuro Re-Education:    Therapeutic Exercises:   LAQs 10 reps bilaterally sitting at EOB  Pain:                    Activity Tolerance:   Refer to assessment above  Please refer to the flowsheet for vital signs taken during this treatment.   After treatment:   []    Patient left in no apparent distress sitting up in chair  [x]    Patient left in no apparent distress in bed  [x]    Call bell left within reach  [x]    Nursing notified  []    Caregiver present  []    Bed alarm activated    COMMUNICATION/COLLABORATION:   The patients plan of care was discussed with: Registered Nurse    Mary Lowe   Time Calculation: 25 mins

## 2017-10-19 NOTE — PROGRESS NOTES
Palliative Medicine Consult  [place]: [phone] ([phone])    Patient Name: Keisha Le  YOB: 1955    Date of Initial Consult: 10-17-17  Reason for Consult: Care decisions   Requesting Provider: Bud/Brandt (primary cardiologist is Dr Margarita Rosa)  Primary Care Physician: Anjelica Mendes MD      SUMMARY:   Keisha Le is a 58 y.o. with a past history of COPD on home Trilogy, morbid obesity,CHF (NYHA II, EF 25% 2017), ICD placed at [facility], CKD who was admitted on 10/14/2017 from home with worsening SOB due to pulmonry edema, being diuresed but need to monitor renal function- renal consulted. Pt was hospitalized for similar sx in 2017, went to [facility] and then back home where she lives alone. States that her dtr-in-law Tu Cordova is main source of support. Current medical issues leading to Palliative Medicine involvement include: care decisions. PALLIATIVE DIAGNOSES:   1. Shortness of breath on BIPAP- COPD and CHF  2. Debility due to breathing, weight, leg ulcers  3. Anxiety   4. Mult medical issues as outlined above        PLAN:   1. Try to discuss advanced medical planning further today w/ pt along w/ [facility] INPATIENT, take off BIPAP but starts to feel anxious. Has been on most of today. 2. Shares that she and her  talked in detail yest evening and that he will support her more. Seems to be complex family dynamics. Pt states we can call  (cannot give us the #) so I try to reach out to sister Sudeep but just leave message. 3. Will cont to follow. Alysa Shea will also try to build rapport. 4. Communicated plan of care with: Palliative IDT; Elvia RN      Addendum: speak to sister Sudeep, seems that pt told her yest about her discussions w/ our team. Pt has been together w/ Diane Gutiérrez for many years, but sister feels cannot be of physical or emotional support to her. She lives 25 miles away and cannot help often.      Pt had son ~31 yo who  from colon cancer last year and this was very hard on her. The dtr in law [address] still involved. Pt also w/ another son, but not very present. Discussed medical condition and sister shares that their mother had bad COPD and eventually needed a trach. She admits that every time pt comes in the hospital, she gets a little bit worse and definitely sees her \"going down hill. \" Rodriguez Marking w/ us talking about overall goals w/ pt as time goes on. Will cont to follow. GOALS OF CARE / TREATMENT PREFERENCES:   [====Goals of Care====]  GOALS OF CARE:  Patient / health care proxy stated goals: improve breathing       TREATMENT PREFERENCES:   Code Status: Full Code    Advance Care Planning:- to be determined   Advance Care Planning 10/19/2017   Patient's Healthcare Decision Maker is: Legal Next of Kin   Primary Decision Maker Name    Primary Decision Maker Phone Number Karen Vaca    Primary Decision Maker Relationship to Patient Spouse   Confirm Advance Directive None       Other:    The palliative care team has discussed with patient / health care proxy about goals of care / treatment preferences for patient.  [====Goals of Care====]         HISTORY:         HPI/SUBJECTIVE:    The patient is:   [x] Verbal and participatory  [] Non-participatory due to:     Pt in bed w/ BIPAP in place, have it taken off for mouth care and sats stay ~88 % but gets anxious as would like it back on.     Clinical Pain Assessment (nonverbal scale for severity on nonverbal patients):   [++++ Clinical Pain Assessment++++]  [++++Pain Severity++++]: Pain: 0  [++++Pain Character++++]:   [++++Pain Duration++++]:   [++++Pain Effect++++]:   [++++Pain Factors++++]:   [++++Pain Frequency++++]:   [++++Pain Location++++]:   [++++ Clinical Pain Assessment++++]  Duration: for how long has pt been experiencing pain (e.g., 2 days, 1 month, years)  Frequency: how often pain is an issue (e.g., several times per day, once every few days, constant)     FUNCTIONAL ASSESSMENT:     Palliative Performance Scale (PPS):  PPS: 40       PSYCHOSOCIAL/SPIRITUAL SCREENING:     Advance Care Planning:  Advance Care Planning 10/19/2017   Patient's Healthcare Decision Maker is: Legal Next of Kin   Primary Decision Maker Name    Primary Decision Maker Phone Number Tulio Lang    Primary Decision Maker Relationship to Patient Spouse   Confirm Advance Directive None        Any spiritual / Jain concerns:  [] Yes /  [x] No    Caregiver Burnout:  [] Yes /  [] No /  [x] No Caregiver Present      Anticipatory grief assessment:   [x] Normal  / [] Maladaptive       ESAS Anxiety: Anxiety: 3    ESAS Depression: Depression: 0        REVIEW OF SYSTEMS:     Positive and pertinent negative findings in ROS are noted above in HPI. The following systems were [x] reviewed / [] unable to be reviewed as noted in HPI  Other findings are noted below. Systems: constitutional, ears/nose/mouth/throat, respiratory, gastrointestinal, genitourinary, musculoskeletal, integumentary, neurologic, psychiatric, endocrine. Positive findings noted below. Modified ESAS Completed by: provider   Fatigue: 8 Drowsiness: 0   Depression: 0 Pain: 0   Anxiety: 3 Nausea: 0   Anorexia: 3 Dyspnea: 2     Constipation: No     Stool Occurrence(s): 3        PHYSICAL EXAM:     From RN flowsheet:  Wt Readings from Last 3 Encounters:   10/19/17 (!) 410 lb 7.9 oz (186.2 kg)   09/20/17 (!) 443 lb 5.5 oz (201.1 kg)     Blood pressure 118/77, pulse 60, temperature 97.5 °F (36.4 °C), resp. rate 15, height 5' 7\" (1.702 m), weight (!) 410 lb 7.9 oz (186.2 kg), SpO2 95 %.     Pain Scale 1: Numeric (0 - 10)  Pain Intensity 1: 0                Constitutional: awake, alert, oriented, more fatigued today  Eyes: pupils equal, anicteric  ENMT: no nasal discharge, moist mucous membranes  Respiratory: breathing not labored on BIPAP  Musculoskeletal: LE w/ swelling and wrapped   Skin: warm, dry  Neurologic: following commands, moving all extremities  Psychiatric: full affect, no hallucinations       HISTORY:     Principal Problem:    Acute on chronic respiratory failure ([facility] (10/14/2017)    Active Problems:    Acute respiratory failure ([facility] (10/14/2017)      Past Medical History:   Diagnosis Date    Chronic obstructive pulmonary disease (HCC)     Heart failure ([facility]     Hypertension     Obesity       Past Surgical History:   Procedure Laterality Date    HX PACEMAKER        History reviewed. No pertinent family history. History reviewed, no pertinent family history.   Social History   Substance Use Topics    Smoking status: Former Smoker    Smokeless tobacco: Never Used    Alcohol use No     No Known Allergies   Current Facility-Administered Medications   Medication Dose Route Frequency    milrinone (PRIMACOR) 20 MG/100 ML D5W infusion  0.2 mcg/kg/min IntraVENous CONTINUOUS    albuterol-ipratropium (DUO-NEB) 2.5 MG-0.5 MG/3 ML  3 mL Nebulization BID RT    predniSONE (DELTASONE) tablet 40 mg  40 mg Oral DAILY WITH BREAKFAST    levoFLOXacin (LEVAQUIN) tablet 250 mg  250 mg Oral Q24H    calcium carbonate (TUMS) chewable tablet 200 mg [elemental]  200 mg Oral PRN    influenza vaccine 2017-18 (3 yrs+)(PF) (FLUZONE QUAD/FLUARIX QUAD) injection 0.5 mL  0.5 mL IntraMUSCular PRIOR TO DISCHARGE    albuterol-ipratropium (DUO-NEB) 2.5 MG-0.5 MG/3 ML  3 mL Nebulization Q4H PRN    aspirin delayed-release tablet 81 mg  81 mg Oral DAILY    budesonide (PULMICORT) 500 mcg/2 ml nebulizer suspension  500 mcg Nebulization BID RT    fenofibrate nanocrystallized (TRICOR) tablet 48 mg  48 mg Oral DAILY    sertraline (ZOLOFT) tablet 100 mg  100 mg Oral DAILY    sodium chloride (NS) flush 5-10 mL  5-10 mL IntraVENous Q8H    sodium chloride (NS) flush 5-10 mL  5-10 mL IntraVENous PRN    ondansetron (ZOFRAN) injection 4 mg  4 mg IntraVENous Q4H PRN    heparin (porcine) injection 5,000 Units  5,000 Units SubCUTAneous Q8H    senna-docusate (PERICOLACE) 8.6-50 mg per tablet 1 Tab  1 Tab Oral QHS    polyethylene glycol (MIRALAX) packet 17 g  17 g Oral DAILY          LAB AND IMAGING FINDINGS:     Lab Results   Component Value Date/Time    WBC 8.3 10/19/2017 03:19 AM    HGB 12.9 10/19/2017 03:19 AM    PLATELET 275 22/45/5892 03:19 AM     Lab Results   Component Value Date/Time    Sodium 133 10/19/2017 03:19 AM    Potassium 4.9 10/19/2017 03:19 AM    Chloride 88 10/19/2017 03:19 AM    CO2 34 10/19/2017 03:19 AM    BUN 99 10/19/2017 03:19 AM    Creatinine 4.13 10/19/2017 03:19 AM    Calcium 8.7 10/19/2017 03:19 AM    Magnesium 2.5 10/19/2017 03:19 AM    Phosphorus 6.3 10/19/2017 03:19 AM      Lab Results   Component Value Date/Time    AST (SGOT) 17 10/17/2017 03:48 AM    Alk. phosphatase 35 10/17/2017 03:48 AM    Protein, total 6.7 10/17/2017 03:48 AM    Albumin 3.1 10/17/2017 03:48 AM    Globulin 3.6 10/17/2017 03:48 AM     No results found for: INR, PTMR, PTP, PT1, PT2, APTT   No results found for: IRON, FE, TIBC, IBCT, PSAT, FERR   No results found for: PH, PCO2, PO2  No components found for: GLPOC   No results found for: CPK, CKMB             Total time: 35 min   Counseling / coordination time, spent as noted above:25 min   > 50% counseling / coordination?: yes    Prolonged service was provided for  []30 min   []75 min in face to face time in the presence of the patient, spent as noted above. Time Start:   Time End:   Note: this can only be billed with 08945 (initial) or 03103 (follow up). If multiple start / stop times, list each separately.

## 2017-10-19 NOTE — PROGRESS NOTES
Spoke with Mar Valderrama in admissions at [facility] where the patient has been accepted and authorization has been approved. Chart reviewed and worsening Cr level noted along with attending's note. Patient not medically stable at this time for discharge. Authorization expires on Friday.     Jeannine Magallanes RN CRM

## 2017-10-19 NOTE — PROGRESS NOTES
Problem: Falls - Risk of  Goal: *Absence of Falls  Document Nas Fall Risk and appropriate interventions in the flowsheet.     Fall Risk Interventions:  Mobility Interventions: Bed/chair exit alarm, Patient to call before getting OOB, Utilize walker, cane, or other assitive device         Medication Interventions: Bed/chair exit alarm, Patient to call before getting OOB, Teach patient to arise slowly    Elimination Interventions: Call light in reach, Patient to call for help with toileting needs, Toilet paper/wipes in reach    History of Falls Interventions: Door open when patient unattended, Investigate reason for fall, Room close to nurse's station

## 2017-10-19 NOTE — PROGRESS NOTES
Hospitalist Progress Note        Date of Service:  10/19/2017  NAME:  Autumn Gan  :  1955  MRN:  [id number]      Admission Summary: This is a 60-year-old  female with past medical history significant for chronic respiratory failure, home oxygen dependent 4 liters per minute via nasal cannula, COPD, morbid obesity, she uses Trilogy at home, hypertension, chronic systolic congestive heart failure, status post AICD, chronic kidney disease, who presented to [facility] Emergency Department with progressive shortness of breath one day ago. Interval history / Subjective:   Sleeping and on bipap, easily awakened, has no complaints, breathing is improved, no pain, n/v.     Assessment & Plan:     Acute on chronic hypercapnic respiratory failure: multifactorial due to TEAGAN/OHS, COPD, and CHF  -BiPAP weaned from continuous to qhs and prn, continue weaning O2 to home settings  -continue duonebs and steroids      Acute on chronic systolic CHF, NYHA class II at baseline, IV on admission, s/p AICD. -CXR showed pulmonary edema  -ECHO c/w EF 25% with severe hypokinesis on 2017  -BP meds held due to borderline hypotension  -holding Aldactone and Lasix dose due to SANTOS  -she is not on ACE inhibitor or ARB due to CKD. -monitor I/O's, daily weight  -consulted cardiology      COPD with acute exacerbation.   -continue bronchodilators, steroids, BiPAP     SANTOS on CKD stage III: suspected due to diuresis. She was also hypotensive on 10/16 which may have led to ATN.  My suspicion for cardiorenal is lower  -renal US without obstruction  -nephrology following; ordered IV albumin  -started on milrinone by cardiology     Hypokalemia  -resolved; monitor     HTN  -holding BP meds     Hx of depression  -continue Zoloft     Morbid obesity     Legs ulcers, chronic venous stasis  -continue local wound care    Code status: Full Code  DVT prophylaxis: sq heparin    Care Plan discussed with: Patient/Family, Nurse and   Disposition: TBD     Hospital Problems  Date Reviewed: 10/14/2017          Codes Class Noted POA    Acute respiratory failure ([facility] [address] ICD-10-CM: J96.00  ICD-9-CM: 518.81  10/14/2017 Unknown        * (Principal)Acute on chronic respiratory failure ([facility] [address] ICD-10-CM: J96.20  ICD-9-CM: 518.84  10/14/2017 Unknown              Vital Signs:    Last 24hrs VS reviewed since prior progress note.  Most recent are:  Visit Vitals    /77 (BP 1 Location: Right arm, BP Patient Position: At rest;Supine)    Pulse 99    Temp 96.9 °F (36.1 °C)    Resp 20    Ht 5' 7\" (1.702 m)    Wt (!) 186.2 kg (410 lb 7.9 oz)    SpO2 93%    BMI 64.29 kg/m2         Intake/Output Summary (Last 24 hours) at 10/19/17 0918  Last data filed at 10/19/17 0018   Gross per 24 hour   Intake              750 ml   Output                0 ml   Net              750 ml        Physical Examination:     Gen: NAD, non-toxic, extremely obese  HEENT: BiPAP in place  Neck: supple, thick  Heart: RRR, distant heart sounds, no MRG appreciated, unable to assess JVD, trace BLE edema  Lungs: diminished breath sounds globally, normal work of breathing  Abd: soft, NT, ND, BS+, unable to assess organomegaly  Extr: warm  Skin: LE superficial skin wounds dry, chronic venous stasis dermatitis of the lower extremities  Neuro: CN II-XII grossly intact, sleeping  Psych: calm, not anxious nor agitated       Data Review:    Review and/or order of clinical lab test      Labs:     Recent Labs      10/19/17   0319  10/18/17   0349   WBC  8.3  6.1   HGB  12.9  12.9   HCT  41.1  39.6   PLT  185  143*     Recent Labs      10/19/17   0319  10/18/17   0349  10/17/17   0348   NA  133*  133*  138   K  4.9  4.6  4.1   CL  88*  87*  91*   CO2  34*  36*  39*   BUN  99*  85*  68*   CREA  4.13*  3.28*  2.79*   GLU  132*  139*  144*   CA  8.7  8.7  8.6   MG  2.5*  2.3   --    PHOS  6.3*  4.5   --      Recent Labs 10/17/17   0348   SGOT  17   ALT  27   AP  35*   TBILI  2.9*   TP  6.7   ALB  3.1*   GLOB  3.6     No results for input(s): INR, PTP, APTT in the last 72 hours. No lab exists for component: INREXT, INREXT   No results for input(s): FE, TIBC, PSAT, FERR in the last 72 hours. No results found for: FOL, RBCF   No results for input(s): PH, PCO2, PO2 in the last 72 hours. No results for input(s): CPK, CKNDX, TROIQ in the last 72 hours.     No lab exists for component: CPKMB  No results found for: CHOL, CHOLX, CHLST, CHOLV, HDL, LDL, LDLC, DLDLP, TGLX, TRIGL, TRIGP, CHHD, CHHDX  Lab Results   Component Value Date/Time    Glucose (POC) 124 09/15/2017 09:56 PM     No results found for: COLOR, APPRN, SPGRU, REFSG, KARISSA, PROTU, GLUCU, KETU, BILU, UROU, CONSUELO, LEUKU, GLUKE, EPSU, BACTU, WBCU, RBCU, CASTS, UCRY      Medications Reviewed:     Current Facility-Administered Medications   Medication Dose Route Frequency    milrinone (PRIMACOR) 20 MG/100 ML D5W infusion  0.2 mcg/kg/min IntraVENous CONTINUOUS    albuterol-ipratropium (DUO-NEB) 2.5 MG-0.5 MG/3 ML  3 mL Nebulization BID RT    predniSONE (DELTASONE) tablet 40 mg  40 mg Oral DAILY WITH BREAKFAST    levoFLOXacin (LEVAQUIN) tablet 250 mg  250 mg Oral Q24H    calcium carbonate (TUMS) chewable tablet 200 mg [elemental]  200 mg Oral PRN    influenza vaccine 2017-18 (3 yrs+)(PF) (FLUZONE QUAD/FLUARIX QUAD) injection 0.5 mL  0.5 mL IntraMUSCular PRIOR TO DISCHARGE    albuterol-ipratropium (DUO-NEB) 2.5 MG-0.5 MG/3 ML  3 mL Nebulization Q4H PRN    aspirin delayed-release tablet 81 mg  81 mg Oral DAILY    budesonide (PULMICORT) 500 mcg/2 ml nebulizer suspension  500 mcg Nebulization BID RT    carvedilol (COREG) tablet 6.25 mg  6.25 mg Oral BID WITH MEALS    fenofibrate nanocrystallized (TRICOR) tablet 48 mg  48 mg Oral DAILY    sertraline (ZOLOFT) tablet 100 mg  100 mg Oral DAILY    sodium chloride (NS) flush 5-10 mL  5-10 mL IntraVENous Q8H    sodium chloride (NS) flush 5-10 mL  5-10 mL IntraVENous PRN    ondansetron (ZOFRAN) injection 4 mg  4 mg IntraVENous Q4H PRN    heparin (porcine) injection 5,000 Units  5,000 Units SubCUTAneous Q8H    senna-docusate (PERICOLACE) 8.6-50 mg per tablet 1 Tab  1 Tab Oral QHS    polyethylene glycol (MIRALAX) packet 17 g  17 g Oral DAILY     ______________________________________________________________________  EXPECTED LENGTH OF STAY: 4d 12h  ACTUAL LENGTH OF STAY:          5                 Isabelle Osman MD

## 2017-10-19 NOTE — PALLIATIVE CARE
Palliative Medicine Social Work    Dr. Samuel Chavez and I met with patient to follow-up on conversation regarding Advance Directives. Patient was alert on BiPAP and willing to remove so that we could converse more easily. Referred to conversation held yesterday in which patient related that her , Lauren Gorman, was less than supportive at times. She states that they had a desirae and long discussion by phone last night and that he is willing to increase his support and attention to her needs. Patient states that she would trust him the most with any medical decisions if she were unable to direct her own care. Patient is still , so he is considered NOK anyway. She could not recall his phone number. She states, she would next trust her sister, Blanca Thomson ([phone]) with decisions. Began conversation about the importance of considering care goals for the future, especially since she has become more dependent on BiPAP support. Patient has never been intubated. Talked about intubation as next step when BiPAP support is no longer sufficient or if she experienced more acute respiratory failure. Talked about chronic and progressive nature of her COPD; eventually the cause of her death and reality of resuscitative efforts being unsuccessful or leaving her in a more compromised state. Patient too fatigued off BiPAP and acknowledged not being able to focus well. She agreed to plan for us to call her sister to obtain contact information for Eder Engel and to talk further with him about goals. Thank you for the opportunity to be involved in the care of Ms. Tolbert. Phylicia Sommers, JENAROW, [facility]-  Palliative Medicine   [employer] ® ACP Facilitator   [phone]

## 2017-10-19 NOTE — PROGRESS NOTES
5804 Pt c/o severe anxiety and fear. Pt states it feels like a \"panic attack. \" Paged MD.  2185 Received orders for Ativan    0730 Bedside shift change report given to Caio Figueroa RN (oncoming nurse) by Edilson Muniz RN (offgoing nurse). Report included the following information SBAR, Kardex, Intake/Output, MAR, Recent Results and Cardiac Rhythm Paced.

## 2017-10-19 NOTE — PROGRESS NOTES
Cardiology Progress Note            Admit Date: 10/14/2017  Admit Diagnosis: Acute respiratory failure ([facility] [address]  Acute on chronic respiratory failure ([facility] [address]  Date: 10/19/2017     Time: 3:20 PM     Subjective:  Back on BIPAP for SOB. Asleep. Arouses. Beginning to feel better. Assessment and Plan     1: CHF, systolic chronic                        - NYHA class II -III: difficult to assess with hypoxia related to COPD exacerbation.                         - EF 25% on echo 9/2017                        - Coreg 6.25 mg BID                        - No ACE/ARB 2/2 CKD                        - Hydralazine 25 mg TID - stopped                        - Isordil 10 mg PO TID - stopped                        - Diuretics stopped                        - S/p AICD at [facility]      2: Cardiomyopathy with LVEF 25%                        - AICD in place - wide complex - Perhaps BiV in future                        - GDT meds on board, Aldactone held  3: COPD.                       - Per Primary team                        - Trilogy for home, BIPAP here                                  - She will need an appropriately fitting mask  4. A/C hypercapnic hypoxic respiratory failure                                  - BIPAP prn                                  - Trilogy at home, needs new mask. Will Consult CM to have [address] bring a few masks for fitting once discharged home  5: SANTOS on CRF, presumably diuretics                        - Seem like baseline 1.5-1.8                                  - Cr. 4.13             - Nephrology following -             - Albumin running x 4 over 24h             - Primacor started  6. HTN             - hypotension currently             - stopped Hydralazine and Isordil - would like to keep BP a little higher to help with renal perfusion. 7. Body mass index is 69.44 kg/(m^2).                         Morbidly obese.  Diet and exercise discussed      Cr worse. Primacor started. Hydralazine and Isordil stopped. HR falling into 40s? Interrogate ICD, check low end rate. According to last check low rate is set to 60 BPM.   Diuretics still on hold. Weight is 410, down from a high of 450 on last admission. ROS:  A comprehensive review of systems was negative except for that written in the HPI. Objective:      Physical Exam:                Visit Vitals    /77 (BP 1 Location: Right arm, BP Patient Position: At rest;Supine)    Pulse 99    Temp 96.9 °F (36.1 °C)    Resp 20    Ht 5' 7\" (1.702 m)    Wt (!) 410 lb 7.9 oz (186.2 kg)    SpO2 93%    BMI 64.29 kg/m2        General Appearance:   Well developed, well nourished,alert and oriented x 3, and   individual in no acute distress. Ears/Nose/Mouth/Throat:    Hearing grossly normal.         Neck:  Supple. Chest:    Lungs clear to auscultation bilaterally. Cardiovascular:   Regular rate and rhythm, S1, S2 normal, no murmur. Abdomen:    Soft, non-tender, bowel sounds are active. Extremities:  No edema bilaterally. Skin:  Warm and dry. Telemetry: normal sinus rhythm, paced.           Data Review:    Labs:    Recent Results (from the past 24 hour(s))   PHOSPHORUS    Collection Time: 10/19/17  3:19 AM   Result Value Ref Range    Phosphorus 6.3 (H) 2.6 - 4.7 MG/DL   CBC W/O DIFF    Collection Time: 10/19/17  3:19 AM   Result Value Ref Range    WBC 8.3 3.6 - 11.0 K/uL    RBC 4.34 3.80 - 5.20 M/uL    HGB 12.9 11.5 - 16.0 g/dL    HCT 41.1 35.0 - 47.0 %    MCV 94.7 80.0 - 99.0 FL    MCH 29.7 26.0 - 34.0 PG    MCHC 31.4 30.0 - 36.5 g/dL    RDW 18.9 (H) 11.5 - 14.5 %    PLATELET 074 937 - 736 K/uL   METABOLIC PANEL, BASIC    Collection Time: 10/19/17  3:19 AM   Result Value Ref Range    Sodium 133 (L) 136 - 145 mmol/L    Potassium 4.9 3.5 - 5.1 mmol/L    Chloride 88 (L) 97 - 108 mmol/L    CO2 34 (H) 21 - 32 mmol/L    Anion gap 11 5 - 15 mmol/L    Glucose 132 (H) 65 - 100 mg/dL    BUN 99 (H) 6 - 20 MG/DL    Creatinine 4.13 (H) 0.55 - 1.02 MG/DL    BUN/Creatinine ratio 24 (H) 12 - 20      GFR est AA 13 (L) >60 ml/min/1.73m2    GFR est non-AA 11 (L) >60 ml/min/1.73m2    Calcium 8.7 8.5 - 10.1 MG/DL   MAGNESIUM    Collection Time: 10/19/17  3:19 AM   Result Value Ref Range    Magnesium 2.5 (H) 1.6 - 2.4 mg/dL          Radiology:        Current Facility-Administered Medications   Medication Dose Route Frequency    milrinone (PRIMACOR) 20 MG/100 ML D5W infusion  0.2 mcg/kg/min IntraVENous CONTINUOUS    albuterol-ipratropium (DUO-NEB) 2.5 MG-0.5 MG/3 ML  3 mL Nebulization BID RT    predniSONE (DELTASONE) tablet 40 mg  40 mg Oral DAILY WITH BREAKFAST    levoFLOXacin (LEVAQUIN) tablet 250 mg  250 mg Oral Q24H    calcium carbonate (TUMS) chewable tablet 200 mg [elemental]  200 mg Oral PRN    influenza vaccine 2017-18 (3 yrs+)(PF) (FLUZONE QUAD/FLUARIX QUAD) injection 0.5 mL  0.5 mL IntraMUSCular PRIOR TO DISCHARGE    albuterol-ipratropium (DUO-NEB) 2.5 MG-0.5 MG/3 ML  3 mL Nebulization Q4H PRN    aspirin delayed-release tablet 81 mg  81 mg Oral DAILY    budesonide (PULMICORT) 500 mcg/2 ml nebulizer suspension  500 mcg Nebulization BID RT    carvedilol (COREG) tablet 6.25 mg  6.25 mg Oral BID WITH MEALS    fenofibrate nanocrystallized (TRICOR) tablet 48 mg  48 mg Oral DAILY    sertraline (ZOLOFT) tablet 100 mg  100 mg Oral DAILY    sodium chloride (NS) flush 5-10 mL  5-10 mL IntraVENous Q8H    sodium chloride (NS) flush 5-10 mL  5-10 mL IntraVENous PRN    ondansetron (ZOFRAN) injection 4 mg  4 mg IntraVENous Q4H PRN    heparin (porcine) injection 5,000 Units  5,000 Units SubCUTAneous Q8H    senna-docusate (PERICOLACE) 8.6-50 mg per tablet 1 Tab  1 Tab Oral QHS    polyethylene glycol (MIRALAX) packet 17 g  17 g Oral DAILY          Ernst Alvarez PA-C    Patient seen earlier today and examined  and agree with Advance Practice Provider (ENID, NP,PA)  assessment and plans.    Seen 10-19 post dated note  Pt with contineud bipap needs    Ihsan Coker MD       [facility] [address], [address] [place] [address],[address]   [facility], [facility]   [phone]

## 2017-10-19 NOTE — PROGRESS NOTES
Bedside shift change report given to [address] [place] [address] (oncoming nurse) by Hemal Maddox RN (offgoing nurse). Report included the following information SBAR, Kardex, ED Summary, Procedure Summary, Intake/Output, MAR and Recent Results       Hourly rounds completed throughout shift.

## 2017-10-20 NOTE — WOUND CARE
Wound Consult: Re-consulted for bariatric air support surface for patient; patient's nurse and mobility team member at bedside to assist in turning; sacrum/buttocks intact, faintly pink, blanching; ischial areas/gluteal folds red, blanching. Some linear blanching areas along right thigh; denuded areas at right flank; she has significant bruising/ecchymotic areas on this flank as well. Heels with blanching pink/red intact skin. Left lower leg with dressing in place (seen earlier this week by Adarsh Myers). Called [facility] for total care bariatric plus surface.   Alvino Mae, [address] [place] [address]  Office [phone]  Pager # [phone]

## 2017-10-20 NOTE — PROGRESS NOTES
Follow up visit with MsCarly Tomasz. Pt was on bi-pap during visit. She shared that she didn't sleep well last night but is hopeful that things will improve. Pt reached for my hand - offered ministry of presence and emotional support. Attempted to explore with pt regarding significant beliefs or practices that might be important to her, and asked specifically if prayer might be helpful or meaningful. She declined prayer, saying that just being present and emotional support was what is meaningful to her. Assured her of ongoing support as able.     Mela Vale, Palliative

## 2017-10-20 NOTE — DIALYSIS
[facility]                         [phone]  Vitals Pre Post Assessment Pre Post   BP 99/51 110/43 LOC AXOx3 AXOx3   HR 60 60 Lungs Diminished Diminished   Temp 97.6 98.0 Cardiac Paced Paced   Resp 29 20 Skin Warm, dy Warm, dry   Weight 186.2 kg 185.2 kg Edema Generalized +3 Generalized +3      Pain 0/10 0/10     Orders   Duration: Start: 1935 End: 2135 Total: 2 hours   Dialyzer: revaclear   K Bath: 2   Ca Bath: 2.5   Na / Bicarb: 140/35   Target Fluid Removal: 1000 ml     Access   Type & Location: Right IJ temporary CVC 10/20/17   Comments:                            Prepped per policy     Labs   Hep B status / date: Unknown   Obtained/Reviewed  Critical Results Called Reviewed. .n/a       Meds Given   Name Dose Route                    Total Liters Process: 28.6   Net Fluid Removed: 1000 ml      Comments   Time Out Done: 1930   Primary Nurse Rpt Pre: KAVON Shen RN   Primary Nurse Rpt Post: Jeimy Duvall RN   Pt Education: Diet, access care, fluid   Care Plan: No d/c plans yet   Tx Summary: Patient tolerated 2 hours of HD without difficulty. All possible blood returned, A/V ports packed with NS post treatment.

## 2017-10-20 NOTE — PROGRESS NOTES
Spoke with Von Murillo, liaison at [facility] to update them on patient. In light of her current medical issues they are aware that she is not stable to discharge. CM will update them again on her progress next week.     Sergio Oseguera RN CRM

## 2017-10-20 NOTE — CONSULTS
Cardiac Electrophysiology Consultation Note     Subjective:      Byron Herrera is a 58 y.o. female patient who is seen for evaluation of ICD function and possible biventricular ICD upgrade kindly referred by Dr. Bentley Palma & Mark Jang. PMHx includes cardiomyopathy, s/p AICD dual chamber Medtronic- implanted at [facility] in 11/2016, VT, hypertension, CKD III, chronic systolic CHF NYHA class undetermined, LBBB, LE venous dermatitis and BLE lymphedema. The patient has been living at [facility] term care [facility]. She was admitted to [facility] 10/14/17 for respiratory failure. She was seen last month during an admission for CHF and VT that was treated with ATP.   SOB/ LE edema on admission. Now on Bipap breathing comfortable. She will be getting HD today. No pain reported. Echo 9/14/17 LVEF 25%. Severe diffuse hypokinesis. moderately dilated. LA moderately to severely dilated. Mild TR. Mild to moderate pulmonary hypertension. Patient Active Problem List    Diagnosis Date Noted    Acute respiratory failure ([facility] [address] 10/14/2017    Acute on chronic respiratory failure ([facility] [address] 10/14/2017    SOB (shortness of breath) 09/14/2017     No current facility-administered medications on file prior to encounter. Current Outpatient Prescriptions on File Prior to Encounter   Medication Sig Dispense Refill    bumetanide (BUMEX) 1 mg tablet Take 1 Tab by mouth two (2) times a day. 60 Tab 3    carvedilol (COREG) 6.25 mg tablet Take 1 Tab by mouth two (2) times daily (with meals). 60 Tab 3    hydrALAZINE (APRESOLINE) 25 mg tablet Take 1 Tab by mouth three (3) times daily. 90 Tab 3    isosorbide dinitrate (ISORDIL) 10 mg tablet Take 1 Tab by mouth three (3) times daily. 90 Tab 3    spironolactone (ALDACTONE) 25 mg tablet Take 1 Tab by mouth daily. 30 Tab 3    albuterol-ipratropium (DUO-NEB) 2.5 mg-0.5 mg/3 ml nebu 3 mL by Nebulization route every four (4) hours as needed.  30 Nebule 2    budesonide (PULMICORT) 0.5 mg/2 mL nbsp 2 mL by Nebulization route two (2) times a day. 120 mL 0    guaiFENesin ER (MUCINEX) 600 mg ER tablet Take 1 Tab by mouth every twelve (12) hours. 60 Tab 0    potassium chloride SR (K-TAB) 20 mEq tablet Take 1 Tab by mouth two (2) times a day. 60 Tab 1    sertraline (ZOLOFT) 100 mg tablet Take 1 Tab by mouth daily. 15 Tab 0    aspirin delayed-release 81 mg tablet Take 81 mg by mouth daily.  fenofibrate nanocrystallized (TRICOR) 48 mg tablet Take 48 mg by mouth daily.  predniSONE (DELTASONE) 10 mg tablet Take 4 tabs daily X 3 days, then Take 3 tabs X 3 days, then 2 tabs X 3 days, then 1 tab X 3 days.  30 Tab 0       Current Facility-Administered Medications   Medication Dose Route Frequency Provider Last Rate Last Dose    [START ON 10/21/2017] predniSONE (DELTASONE) tablet 30 mg  30 mg Oral DAILY WITH BREAKFAST Kathleen Schlatter, MD        milrinone (PRIMACOR) 20 MG/100 ML D5W infusion  0.2 mcg/kg/min IntraVENous CONTINUOUS Eliazardelores Sanchez PA-C   Stopped at 10/20/17 1000    albuterol-ipratropium (DUO-NEB) 2.5 MG-0.5 MG/3 ML  3 mL Nebulization BID RT Kathleen Schlatter, MD   3 mL at 10/20/17 0751    calcium carbonate (TUMS) chewable tablet 200 mg [elemental]  200 mg Oral PRN Shane Potter MD   200 mg at 10/18/17 2044    influenza vaccine 2017-18 (3 yrs+)(PF) (FLUZONE QUAD/FLUARIX QUAD) injection 0.5 mL  0.5 mL IntraMUSCular PRIOR TO DISCHARGE Shane Potter MD        albuterol-ipratropium (DUO-NEB) 2.5 MG-0.5 MG/3 ML  3 mL Nebulization Q4H PRN Juliana Partida MD        aspirin delayed-release tablet 81 mg  81 mg Oral DAILY Juliana Partida MD   81 mg at 10/20/17 0904    budesonide (PULMICORT) 500 mcg/2 ml nebulizer suspension  500 mcg Nebulization BID RT Juliana Partida MD   500 mcg at 10/20/17 0751    fenofibrate nanocrystallized (TRICOR) tablet 48 mg  48 mg Oral DAILY Juliana Partida MD   48 mg at 10/20/17 0903    sertraline (ZOLOFT) tablet 100 mg  100 mg Oral DAILY Dayton Le MD   100 mg at 10/20/17 8236    sodium chloride (NS) flush 5-10 mL  5-10 mL IntraVENous Q8H Dayton Le MD   10 mL at 10/20/17 0600    sodium chloride (NS) flush 5-10 mL  5-10 mL IntraVENous PRN Dayton Le MD        ondansetron [facility]) injection 4 mg  4 mg IntraVENous Q4H PRN Dayton Le MD   4 mg at 10/19/17 0024    heparin (porcine) injection 5,000 Units  5,000 Units SubCUTAneous Q8H Dayton Le MD   5,000 Units at 10/20/17 1119    senna-docusate (Flakito Spikes) 8.6-50 mg per tablet 1 Tab  1 Tab Oral QHS Dayton Le MD   1 Tab at 10/17/17 2136    polyethylene glycol (MIRALAX) packet 17 g  17 g Oral DAILY Dayton Le MD   Stopped at 10/20/17 0900     No Known Allergies  Past Medical History:   Diagnosis Date    Chronic obstructive pulmonary disease ([address]     Heart failure ([address]     Hypertension     Obesity      Past Surgical History:   Procedure Laterality Date    HX PACEMAKER       History reviewed. No sudden death or ICD in family history. Social History   Substance Use Topics    Smoking status: Former Smoker    Smokeless tobacco: Never Used    Alcohol use No      ROS limited as patient is on Bi-pap, refer to HPI       Objective:     Visit Vitals    /42 (BP 1 Location: Right arm, BP Patient Position: At rest)    Pulse 65    Temp 96.1 °F (35.6 °C)    Resp 19    Ht 5' 7\" (1.702 m)    Wt (!) 410 lb 7.9 oz (186.2 kg)    SpO2 91%    BMI 64.29 kg/m2      Physical Exam:   Constitutional: morbidly obese. BIpap on. No distress. Head: Normocephalic and atraumatic. Eyes: Pupils are equal, round  Neck: supple. No JVD present. Cardiovascular: Normal rate, regular rhythm. Exam reveals no gallop and no friction rub. No murmur heard. Pulmonary/Chest: Effort normal and breath sounds normal anterior. No wheezes. exam limited to body habitus   Abdominal: Soft, round.    Musculoskeletal: +3 BLE edema. chronic venous dermatitis, purple discoloration to BLE. RLE wrapped in CDI bandage. LLE dry superificial blisters. Neurological: somnolent  Skin: Skin is warm and dry. ICD scar left infraclavicular unremarkable. Psychiatric: normal mood and affect. EKG: atrial sensed and ventricular paced. Telemetry: ventricular tracking rate 's. Strips this morning reviewed 11 am, HR 40-50's atrial sensing and ventricular pacing, intermittent atrial sensing with ventricular pacing and failure to capture. Assessment/Plan:   Acute on chronic CHF NYHA class undetermined d/t respiratory failure  Cardiomyopathy   S/p dual chamber ICD-Medtronic at [facility]  VT treated with ATP and had ICD shock before  COPD  Hypertension   CKD III  Morbid obesity       Device interrogated again today, ventricular sensitivity adjusted no further T wave over sensing seen during interrogation. Mode changed to DDDD and AV delay adjusted, patient is now appropriately A sensing V pacing, she is pacemaker dependent. Lower rate set at 60 bpm.   Due to current decline in renal function and respiratory status, she would not be considered for a biventricular ICD upgrade at this time. Thank you for involving me in this patient's care and please call with further concerns or questions.     Janny De La Torre NP  [phone]  [facility] [address] from  attending:   I have seen, examined patient, and discussed with nurse practitioner, registered nurse, reviewed, updated note and agree with the assessment and plan    I have talked to her this am. She is very short of breaths and on BIPAP  Vital signs are stable she was ventricular pacing  Exam shows regular rhythm and no rub  Morbidly obese  Assessment and Plan:  Continue to optimize her respiratory and renal function before ICD can be upgraded to BIV ICD  She cannot lie flat at this time and would be on ventilator with sedation  She is pacing dependent and AV delay was not appropriate so I have asked Med[employer] to readjust today  Perez Faust M.D.  [facility]  Electrophysiology/Cardiology  [facility] [place]  [address] , [address], [address] [place] [address]  [phone]

## 2017-10-20 NOTE — PROGRESS NOTES
Pharmacist Note - Vancomycin Dosing (Hemodialysis Patient)    Consult provided for this 58 y.o. female for indication of empiric for hypothermia + hypotension. This patient is also on the following antibiotic regimen(s): cefepime    Lab Results   Component Value Date/Time    Creatinine 5.05 10/20/2017 07:24 AM    WBC 8.3 10/19/2017 03:19 AM     10/20/2017 07:24 AM   Temp (24hrs), Av.4 °F (35.8 °C), Min:95 °F (35 °C), Max:97.4 °F (36.3 °C)      Estimated CrCl:  ESRD on HD (intermittent for SANTOS )    For this HD patient, will initiate therapy with a loading dose of Vancomycin 2000 mg, to be followed with a dose of 1500 mg after each HD session. Dose will be adjusted to maintain a pre-HD trough of approximately 20 - 25 mcg/mL for therapeutic goal of 15 - 20 mcg/mL as approximately 35% of drug will be removed by HD filtration. Pharmacy to follow patient daily and order levels / make dose adjustments as appropriate.

## 2017-10-20 NOTE — PROGRESS NOTES
Dr Arben Peters inserted a Fabián dialysis catheter 14 fr into the patient's right internal jugular. Portable CXR obtained post procedure.  Transported on BIPAP in bed with RN and RT, on monitor

## 2017-10-20 NOTE — PROGRESS NOTES
Bedside shift change report given to Keiry Franco RN (oncoming nurse) by Moises Luis RN (offgoing nurse). Report included the following information SBAR, Kardex, Intake/Output, MAR, Recent Results and Cardiac Rhythm Paced     Q1 Hr rounding completed throughout shift. Marcellus Boyd

## 2017-10-20 NOTE — PROCEDURES
PROCEDURE:Fabián catheter placement at [facility]. INDICATION:dialysis. ANESTHESIA:local.  COMPLICATION:NONE. SPECIMENS REMOVED:none. BLOOD LOSS:NONE. /ASSISTANT:ANNIE Tucker RECOMMENDATIONS:may use. CONSENT OBTAINED:YES.  NOTES:none.

## 2017-10-20 NOTE — PROGRESS NOTES
[facility]   [address] [facility], [facility] [address] [facility], [facility]  Phone: [phone]   [phone]       Nephrology Progress Note  Desi Frances     [birth year]     [id number]  Date of Admission : 10/14/2017  10/20/17    CC: Follow up for SANTOS     Assessment and Plan   SANTOS on CKD :  - likely from ATN given hypotension and bradycardia  - no improvement with inotropes and albumin  - plan for HD today - discussed with the patient and her  over the phone  - both are in agreement  - hopefully, this is a short term issue and will resolve as she is a poor long-term candidate    CKD Stage III   - baseline Cr 1.5 mg /dl     Bradycardia:  - pacer reprogrammed    Chronic Systolic CHF w/ EF 73% with AICD  - wean off milrinone today    COPD excaerbation     TEAGAN      Discussed with patient, , primary team and cardiology     Interval History:  Seen and examined. Cr worse. oligoanuric over the past 24 hours. On milrinone. BPs running 90s/50s this AM.  Bradycardia resolved. On BiPAP. Review of Systems: Review of systems not obtained due to patient factors.     Current Medications:   Current Facility-Administered Medications   Medication Dose Route Frequency    milrinone (PRIMACOR) 20 MG/100 ML D5W infusion  0.2 mcg/kg/min IntraVENous CONTINUOUS    albuterol-ipratropium (DUO-NEB) 2.5 MG-0.5 MG/3 ML  3 mL Nebulization BID RT    predniSONE (DELTASONE) tablet 40 mg  40 mg Oral DAILY WITH BREAKFAST    levoFLOXacin (LEVAQUIN) tablet 250 mg  250 mg Oral Q24H    calcium carbonate (TUMS) chewable tablet 200 mg [elemental]  200 mg Oral PRN    influenza vaccine 2017-18 (3 yrs+)(PF) (FLUZONE QUAD/FLUARIX QUAD) injection 0.5 mL  0.5 mL IntraMUSCular PRIOR TO DISCHARGE    albuterol-ipratropium (DUO-NEB) 2.5 MG-0.5 MG/3 ML  3 mL Nebulization Q4H PRN    aspirin delayed-release tablet 81 mg  81 mg Oral DAILY    budesonide (PULMICORT) 500 mcg/2 ml nebulizer suspension  500 mcg Nebulization BID RT    fenofibrate nanocrystallized (TRICOR) tablet 48 mg  48 mg Oral DAILY    sertraline (ZOLOFT) tablet 100 mg  100 mg Oral DAILY    sodium chloride (NS) flush 5-10 mL  5-10 mL IntraVENous Q8H    sodium chloride (NS) flush 5-10 mL  5-10 mL IntraVENous PRN    ondansetron (ZOFRAN) injection 4 mg  4 mg IntraVENous Q4H PRN    heparin (porcine) injection 5,000 Units  5,000 Units SubCUTAneous Q8H    senna-docusate (PERICOLACE) 8.6-50 mg per tablet 1 Tab  1 Tab Oral QHS    polyethylene glycol (MIRALAX) packet 17 g  17 g Oral DAILY      No Known Allergies    Objective:  Vitals:    Vitals:    10/20/17 0350 10/20/17 0411 10/20/17 0748 10/20/17 0832   BP: 90/46   97/52   Pulse: 100   99   Resp: 18   18   Temp: 96.1 °F (35.6 °C)      SpO2: 92% 93% 92% 92%   Weight: (!) 186.2 kg (410 lb 7.9 oz)      Height:         Intake and Output:     10/18 1901 - 10/20 0700  In: 893.7 [I.V.:893.7]  Out: -     Physical Examination:  General:morbid obesity, on BiPAP, appears comfortable  HEENT: Eyes are PERRL.  Conjunctiva without pallor ,erythema.  The sclerae without icterus. .   Neck:Supple,no mass palpable  Lungs : distant sounds  CVS: RRR, S1 S2 normal, No rub,  chronic LE edema with venous stasis changes  Abdomen: obese, Non tender, No hepatosplenomegaly, bowel sounds present  Extremities: No cyanosis, No clubbing  Skin: No rash or lesions  Neurologic: non focal, AAO x 3  Psych: normal affect       []    High complexity decision making was performed  []    Patient is at high-risk of decompensation with multiple organ involvement    Lab Data Personally Reviewed: I have reviewed all the pertinent labs, microbiology data and radiology studies during assessment.     Recent Labs      10/20/17   0724  10/19/17   0319  10/18/17   0349   NA  133*  133*  133*   K  5.2*  4.9  4.6   CL  87*  88*  87*   CO2  37*  34*  36*   GLU  146*  132*  139*   BUN  113*  99*  85*   CREA  5.05*  4.13*  3.28*   CA  8.5  8.7  8.7   MG  2.5*  2.5*  2.3   PHOS  7.0* 6. 3*  4.5     Recent Labs      10/19/17   0319  10/18/17   0349   WBC  8.3  6.1   HGB  12.9  12.9   HCT  41.1  39.6   PLT  185  143*     No results found for: SDES  Lab Results   Component Value Date/Time    Culture result: NO GROWTH 5 DAYS 10/14/2017 04:18 PM     Recent Results (from the past 24 hour(s))   MAGNESIUM    Collection Time: 10/20/17  7:24 AM   Result Value Ref Range    Magnesium 2.5 (H) 1.6 - 2.4 mg/dL   PHOSPHORUS    Collection Time: 10/20/17  7:24 AM   Result Value Ref Range    Phosphorus 7.0 (H) 2.6 - 4.7 MG/DL   METABOLIC PANEL, BASIC    Collection Time: 10/20/17  7:24 AM   Result Value Ref Range    Sodium 133 (L) 136 - 145 mmol/L    Potassium 5.2 (H) 3.5 - 5.1 mmol/L    Chloride 87 (L) 97 - 108 mmol/L    CO2 37 (H) 21 - 32 mmol/L    Anion gap 9 5 - 15 mmol/L    Glucose 146 (H) 65 - 100 mg/dL     (H) 6 - 20 MG/DL    Creatinine 5.05 (H) 0.55 - 1.02 MG/DL    BUN/Creatinine ratio 22 (H) 12 - 20      GFR est AA 11 (L) >60 ml/min/1.73m2    GFR est non-AA 9 (L) >60 ml/min/1.73m2    Calcium 8.5 8.5 - 10.1 MG/DL       I have reviewed the flowsheets. Chart and Pertinent Notes have been reviewed. No change in PMH ,family and social history from Consult note.       Garfield Miramontes MD

## 2017-10-20 NOTE — PROGRESS NOTES
Bedside and Verbal shift change report given to Janneth Uriostegui (oncoming nurse) by Saintclair Mealy (offgoing nurse). Report included the following information SBAR, Kardex, Intake/Output, MAR, Recent Results and Cardiac Rhythm PAced.

## 2017-10-20 NOTE — PROGRESS NOTES
Cardiology Progress Note            Admit Date: 10/14/2017  Admit Diagnosis: Acute respiratory failure ([facility]  Acute on chronic respiratory failure ([facility] [address]  Date: 10/20/2017     Time: 3:20 PM     Subjective:  BIPAP and feeling better. Assessment and Plan     1: CHF, systolic chronic                        - NYHA class II -III: difficult to assess with hypoxia related to COPD exacerbation.                         - EF 25% on echo 9/2017                        - Coreg 6.25 mg BID                        - No ACE/ARB 2/2 CKD                        - Hydralazine 25 mg TID - stopped                        - Isordil 10 mg PO TID - stopped                        - Diuretics stopped                        - S/p AICD at [facility]      2: Cardiomyopathy with LVEF 25%                        - AICD in place - wide complex - Perhaps BiV in future                        - GDT meds on board, Aldactone held  3: COPD.                       - Per Primary team                        - Trilogy for home, BIPAP here                                  - She will need an appropriately fitting mask  4. A/C hypercapnic hypoxic respiratory failure                                  - BIPAP prn                                  - Trilogy at home, needs new mask. Will Consult CM to have [address] bring a few masks for fitting once discharged home  5: SANTOS on CRF, presumably diuretics                        - Seem like baseline 1.5-1.8                                  - Cr. 5.05 - D/w Dr. Hal Dickens - starting HD today, following non tunneled catheter placement             - Nephrology following -             - Albumin running x 4 over 24h             - Primacor stopping - D/w Dr. Hal Dickens, recommended stopping as it is little helpful and causing low BP  6.  HTN             - hypotension currently             - stopped Hydralazine and Isordil - would like to keep BP a little higher to help with renal perfusion. 7. Body mass index is 69.44 kg/(m^2).                       Morbidly obese.  Diet and exercise discussed      Renal function worse. Nephrology starting HD today. Will stop Primacor to keep pressure as high as possible to adequately dialyze. ICD interrogation yesterday was counting double P waves and no pacing. Reprogrammed and now pacing appropriately. She is now 100% pacer dependent, where before she was pacing approximately 26% of the time. VT events in September, she paced her way out of it. Assessment/Plan/Discussion:Cardiology Attending:     Patient seen earlier today and examined  and agree with Advance Practice Provider (ENDI, NP,PA)  assessment and plans. Diamante Healy is a 58 y.o. female   Continues on bipap  Has complaints of buttock pain, wants to be moved  No chest pain  Remains very dyspneic  AICD with lower rate , will ask EP to evaluate T and P wave sensing  EF 25%  On coreg, holding other meds due to SANTOS  COPD severe  Cr 5.05 cardiorenal failure to get dialysis    Milena De León MD 10/20/2017             ROS:  A comprehensive review of systems was negative except for that written in the HPI. Objective:      Physical Exam:                Visit Vitals    BP 97/52 (BP 1 Location: Right arm, BP Patient Position: Supine)    Pulse 99    Temp 96.1 °F (35.6 °C)    Resp 18    Ht 5' 7\" (1.702 m)    Wt (!) 410 lb 7.9 oz (186.2 kg)    SpO2 92%    BMI 64.29 kg/m2        General Appearance:   Well developed, well nourished,alert and oriented x 3, and   individual in no acute distress. Ears/Nose/Mouth/Throat:    Hearing grossly normal.         Neck:  Supple. Chest:    Lungs clear to auscultation bilaterally. Cardiovascular:   Regular rate and rhythm, S1, S2 normal, no murmur. Abdomen:    Soft, non-tender, bowel sounds are active. Extremities:  No edema bilaterally. Skin:  Warm and dry. Telemetry: normal sinus rhythm, paced.           Data Review: Labs:    Recent Results (from the past 24 hour(s))   MAGNESIUM    Collection Time: 10/20/17  7:24 AM   Result Value Ref Range    Magnesium 2.5 (H) 1.6 - 2.4 mg/dL   PHOSPHORUS    Collection Time: 10/20/17  7:24 AM   Result Value Ref Range    Phosphorus 7.0 (H) 2.6 - 4.7 MG/DL   METABOLIC PANEL, BASIC    Collection Time: 10/20/17  7:24 AM   Result Value Ref Range    Sodium 133 (L) 136 - 145 mmol/L    Potassium 5.2 (H) 3.5 - 5.1 mmol/L    Chloride 87 (L) 97 - 108 mmol/L    CO2 37 (H) 21 - 32 mmol/L    Anion gap 9 5 - 15 mmol/L    Glucose 146 (H) 65 - 100 mg/dL     (H) 6 - 20 MG/DL    Creatinine 5.05 (H) 0.55 - 1.02 MG/DL    BUN/Creatinine ratio 22 (H) 12 - 20      GFR est AA 11 (L) >60 ml/min/1.73m2    GFR est non-AA 9 (L) >60 ml/min/1.73m2    Calcium 8.5 8.5 - 10.1 MG/DL          Radiology:        Current Facility-Administered Medications   Medication Dose Route Frequency    milrinone (PRIMACOR) 20 MG/100 ML D5W infusion  0.2 mcg/kg/min IntraVENous CONTINUOUS    albuterol-ipratropium (DUO-NEB) 2.5 MG-0.5 MG/3 ML  3 mL Nebulization BID RT    predniSONE (DELTASONE) tablet 40 mg  40 mg Oral DAILY WITH BREAKFAST    levoFLOXacin (LEVAQUIN) tablet 250 mg  250 mg Oral Q24H    calcium carbonate (TUMS) chewable tablet 200 mg [elemental]  200 mg Oral PRN    influenza vaccine 2017-18 (3 yrs+)(PF) (FLUZONE QUAD/FLUARIX QUAD) injection 0.5 mL  0.5 mL IntraMUSCular PRIOR TO DISCHARGE    albuterol-ipratropium (DUO-NEB) 2.5 MG-0.5 MG/3 ML  3 mL Nebulization Q4H PRN    aspirin delayed-release tablet 81 mg  81 mg Oral DAILY    budesonide (PULMICORT) 500 mcg/2 ml nebulizer suspension  500 mcg Nebulization BID RT    fenofibrate nanocrystallized (TRICOR) tablet 48 mg  48 mg Oral DAILY    sertraline (ZOLOFT) tablet 100 mg  100 mg Oral DAILY    sodium chloride (NS) flush 5-10 mL  5-10 mL IntraVENous Q8H    sodium chloride (NS) flush 5-10 mL  5-10 mL IntraVENous PRN    ondansetron (ZOFRAN) injection 4 mg  4 mg IntraVENous Q4H PRN    heparin (porcine) injection 5,000 Units  5,000 Units SubCUTAneous Q8H    senna-docusate (PERICOLACE) 8.6-50 mg per tablet 1 Tab  1 Tab Oral QHS    polyethylene glycol (MIRALAX) packet 17 g  17 g Oral DAILY          ADELINA Amos MD       [facility] [address] [place] [address], [address] [place] [address],[address]   [place] [address] [address] [facility]   [phone]

## 2017-10-20 NOTE — PROGRESS NOTES
Informed that the patient is hypotensive and hypothermic. On bedside eval she has no complaints and is alert. Check a serum lactate, blood and urine culture (has a Workman). 500 mL IVNS bolus and re-assess, cautious IV hydration for now given her CHF and she is asymptomatic from a sepsis standpoint. Add IV vancomycin and Zosyn. Warming blanket if needed.

## 2017-10-20 NOTE — PROGRESS NOTES
Hospitalist Progress Note        Date of Service:  10/20/2017  NAME:  Cristian Villasenor  :  1955  MRN:  [id number]      Admission Summary: This is a 80-year-old  female with past medical history significant for chronic respiratory failure, home oxygen dependent 4 liters per minute via nasal cannula, COPD, morbid obesity, she uses Trilogy at home, hypertension, chronic systolic congestive heart failure, status post AICD, chronic kidney disease, who presented to [facility] Emergency Department with progressive shortness of breath one day ago. Interval history / Subjective:   Breathing is at baseline although she hasn't been out of bed. No chest pain, no n/v/d. Anxious about the prospect of starting dilaysis. Assessment & Plan:     Acute on chronic hypercapnic respiratory failure: multifactorial due to TEAGAN/OHS, COPD, and CHF  -BiPAP weaned from continuous to qhs and prn, continue weaning O2 to home settings  -continue duonebs, wean steroids      Acute on chronic systolic CHF, NYHA class II at baseline, IV on admission, s/p AICD. -CXR showed pulmonary edema  -ECHO c/w EF 25% with severe hypokinesis on 2017  -BP meds held due to borderline hypotension  -holding Aldactone and Lasix dose due to SANTOS  -she is not on ACE inhibitor or ARB due to SANTOS/CKD. -monitor I/O's, daily weight  -consulted cardiology  -started on milrinone on 10/19, however she appears intravascularly euvolemic to dry and this is likely making hypotensive. D/w cardiology and nephrology, will stop milrinone today      COPD with acute exacerbation. resolving  -continue bronchodilators, steroids, BiPAP  -stop levofloxacin    SANTOS on CKD stage III: suspected due to diuresis. She was also hypotensive on 10/16 which may have led to ATN.  My suspicion for cardiorenal is lower  -renal US without obstruction  -nephrology following - plan for HD     Hypokalemia now developing hyperkalemia in the setting of renal failure  -monitor with HD     HTN  -holding BP meds     Hx of depression  -continue Zoloft     Morbid obesity     Legs ulcers, chronic venous stasis  -continue local wound care    Code status: Full code  DVT prophylaxis: sq heparin    Care Plan discussed with: Patient/Family, Nurse and   Disposition: TBD     Hospital Problems  Date Reviewed: 10/14/2017          Codes Class Noted POA    Acute respiratory failure ([facility] ICD-10-CM: J96.00  ICD-9-CM: 518.81  10/14/2017 Unknown        * (Principal)Acute on chronic respiratory failure ([facility] ICD-10-CM: J96.20  ICD-9-CM: 518.84  10/14/2017 Unknown              Vital Signs:    Last 24hrs VS reviewed since prior progress note.  Most recent are:  Visit Vitals    /40    Pulse 91    Temp 96.1 °F (35.6 °C)    Resp 18    Ht 5' 7\" (1.702 m)    Wt (!) 186.2 kg (410 lb 7.9 oz)    SpO2 92%    BMI 64.29 kg/m2         Intake/Output Summary (Last 24 hours) at 10/20/17 1005  Last data filed at 10/20/17 0015   Gross per 24 hour   Intake           143.73 ml   Output                0 ml   Net           143.73 ml        Physical Examination:     Gen: NAD, non-toxic, extremely obese  HEENT: BiPAP in place  Neck: supple, thick  Heart: RRR, distant heart sounds, no MRG appreciated, unable to assess JVD, trace BLE edema/lymphedema  Lungs: diminished breath sounds globally, normal work of breathing  Abd: soft, NT, ND, BS+, unable to assess organomegaly  Extr: warm, b/l LE lymphedema  Skin: LE superficial skin wounds dry, chronic venous stasis dermatitis of the lower extremities  Neuro: CN II-XII grossly intact, sleeping  Psych: calm, not anxious nor agitated       Data Review:    Review and/or order of clinical lab test      Labs:     Recent Labs      10/19/17   0319  10/18/17   0349   WBC  8.3  6.1   HGB  12.9  12.9   HCT  41.1  39.6   PLT  185  143*     Recent Labs      10/20/17   0724  10/19/17   0319  10/18/17   0349   NA  133*  133* 133*   K  5.2*  4.9  4.6   CL  87*  88*  87*   CO2  37*  34*  36*   BUN  113*  99*  85*   CREA  5.05*  4.13*  3.28*   GLU  146*  132*  139*   CA  8.5  8.7  8.7   MG  2.5*  2.5*  2.3   PHOS  7.0*  6.3*  4.5     No results for input(s): SGOT, GPT, ALT, AP, TBIL, TBILI, TP, ALB, GLOB, GGT, AML, LPSE in the last 72 hours. No lab exists for component: AMYP, HLPSE  No results for input(s): INR, PTP, APTT in the last 72 hours. No lab exists for component: INREXT, INREXT   No results for input(s): FE, TIBC, PSAT, FERR in the last 72 hours. No results found for: FOL, RBCF   No results for input(s): PH, PCO2, PO2 in the last 72 hours. No results for input(s): CPK, CKNDX, TROIQ in the last 72 hours.     No lab exists for component: CPKMB  No results found for: CHOL, CHOLX, CHLST, CHOLV, HDL, LDL, LDLC, DLDLP, TGLX, TRIGL, TRIGP, CHHD, CHHDX  Lab Results   Component Value Date/Time    Glucose (POC) 124 09/15/2017 09:56 PM     No results found for: COLOR, APPRN, SPGRU, REFSG, KRAISSA, PROTU, GLUCU, KETU, BILU, UROU, CONSUELO, LEUKU, GLUKE, EPSU, BACTU, WBCU, RBCU, CASTS, UCRY      Medications Reviewed:     Current Facility-Administered Medications   Medication Dose Route Frequency    milrinone (PRIMACOR) 20 MG/100 ML D5W infusion  0.2 mcg/kg/min IntraVENous CONTINUOUS    albuterol-ipratropium (DUO-NEB) 2.5 MG-0.5 MG/3 ML  3 mL Nebulization BID RT    predniSONE (DELTASONE) tablet 40 mg  40 mg Oral DAILY WITH BREAKFAST    levoFLOXacin (LEVAQUIN) tablet 250 mg  250 mg Oral Q24H    calcium carbonate (TUMS) chewable tablet 200 mg [elemental]  200 mg Oral PRN    influenza vaccine 2017-18 (3 yrs+)(PF) (FLUZONE QUAD/FLUARIX QUAD) injection 0.5 mL  0.5 mL IntraMUSCular PRIOR TO DISCHARGE    albuterol-ipratropium (DUO-NEB) 2.5 MG-0.5 MG/3 ML  3 mL Nebulization Q4H PRN    aspirin delayed-release tablet 81 mg  81 mg Oral DAILY    budesonide (PULMICORT) 500 mcg/2 ml nebulizer suspension  500 mcg Nebulization BID RT    fenofibrate nanocrystallized (TRICOR) tablet 48 mg  48 mg Oral DAILY    sertraline (ZOLOFT) tablet 100 mg  100 mg Oral DAILY    sodium chloride (NS) flush 5-10 mL  5-10 mL IntraVENous Q8H    sodium chloride (NS) flush 5-10 mL  5-10 mL IntraVENous PRN    ondansetron (ZOFRAN) injection 4 mg  4 mg IntraVENous Q4H PRN    heparin (porcine) injection 5,000 Units  5,000 Units SubCUTAneous Q8H    senna-docusate (PERICOLACE) 8.6-50 mg per tablet 1 Tab  1 Tab Oral QHS    polyethylene glycol (MIRALAX) packet 17 g  17 g Oral DAILY     ______________________________________________________________________  EXPECTED LENGTH OF STAY: 4d 12h  ACTUAL LENGTH OF STAY:          6                 Rosario Lebron MD

## 2017-10-21 NOTE — PROGRESS NOTES
10/21/2017    Naheed Guzmán MD   [id number] PCP:-MD Nataliya Stokes [facility]  -Progress Note     . Janiya Johnsonio Serve is a 58 y.o. female   No new complaints  Comfortable   No complaints  Denies chest pain, heart palpitations , increasing edema, pre-syncope No problems overnight, rhythm and hemodynamics stable -see vitals below  Remains on Bipap    1: CHF, systolic chronic                        - NYHA class II -III: difficult to assess with hypoxia related to COPD exacerbation.                         - EF 25% on echo 9/2017                        - Coreg 6.25 mg BID                        - No ACE/ARB 2/2 CKD                        - Hydralazine 25 mg TID - stopped                        - Isordil 10 mg PO TID - stopped                        - Diuretics stopped                        - S/p AICD at [facility]      2: Cardiomyopathy with LVEF 25%  We changed mode on AICD to avoid underpacing, try increase perfusion pressure-see EP note                        - AICD in place - wide complex -                         - GDT meds on board, Aldactone held  3: COPD.                       - Per Primary team                        - Trilogy for home, BIPAP here                                  - She will need an appropriately fitting mask  4. A/C hypercapnic hypoxic respiratory failure                                  - BIPAP prn                                  - Trilogy at home, needs new mask. Will Consult CM to have [address] bring a few masks for fitting once discharged home  5: SANTOS on CRF, presumably diuretics                        - Seem like baseline 1.5-1. 8                                 -   Lab Results   Component Value Date/Time    Creatinine 5.08 10/21/2017 03:22 AM     -startedHD  following non tunneled catheter placement                                  - Nephrology following -                                  -   6. HTN - hypotension currently                                  - stopped Hydralazine and Isordil - would like to keep BP a little higher to help with renal perfusion. 7. Body mass index is 69.44 kg/(m^2).                       Morbidly obese.         Other co-morbids:    has a past medical history of Chronic obstructive pulmonary disease ([facility] Heart failure ([facility] Hypertension; and Obesity. Cardiac Studies/Hx:  No specialty comments available. Past Medical History:   Diagnosis Date    Chronic obstructive pulmonary disease ([facility]     Heart failure ([facility]     Hypertension     Obesity       ROS-pertinents  negative except as above  The pertinent portions of the medical history,physician and nursing notes, meds,vitals , labs and Ins/Outs,are reviewed in the electronic record. Objective:    Physical Exam:   Vitals:    10/21/17 0851 10/21/17 0901 10/21/17 0908 10/21/17 1122   BP:  125/67  114/56   BP 1 Location:  Right arm  Right arm   BP Patient Position:  At rest  At rest   Pulse:  64  63   Resp:  22  22   Temp:  97 °F (36.1 °C)  96.5 °F (35.8 °C)   TempSrc:       SpO2: 94% 96% 94% 93%   Weight:       Height:         Patient Vitals for the past 12 hrs:   Temp Pulse Resp BP SpO2   10/21/17 1122 96.5 °F (35.8 °C) 63 22 114/56 93 %   10/21/17 0908 - - - - 94 %   10/21/17 0901 97 °F (36.1 °C) 64 22 125/67 96 %   10/21/17 0851 - - - - 94 %   10/21/17 0758 97.1 °F (36.2 °C) 62 17 120/48 91 %   10/21/17 0356 98.4 °F (36.9 °C) 61 20 109/42 95 %   10/21/17 0321 - - - - 92 %   10/21/17 0045 - - - - 92 %      Constitutional:  Overweight and morbidly obese , truncal obesity is severeand well-nourished. On bipap in chronic  distress. HENT: Head: Normocephalic. Eyes: No scleral icterus. Neck:  Neck obese/ supple. No JVD present. No tracheal deviation present. Pulmonary/Chest: Effort normal and breath sounds normal. No stridor. No respiratory distress, wheezes or rales.   Cardiovascular: Normal rate, regular rhythm, normal heart sounds . Exam reveals no gallop and no friction rub. No murmur heard. PMI non displaced. Extremities: 1+ edema with chronic venous stasis disease at shins  Abdominal:   no abnormal distension. Neurological:  alert and oriented. Coordination seems grossly normal.   Skin: Skin is not cold. No rash noted. Not diaphoretic. No erythema. Psychiatric:  Grossly normal mood and affect.   Behavior appears normal.    Last 24hr Input/Output:    Intake/Output Summary (Last 24 hours) at 10/21/17 1132  Last data filed at 10/20/17 2135   Gross per 24 hour   Intake                0 ml   Output             1000 ml   Net            -1000 ml        Data Review:   Recent Results (from the past 24 hour(s))   LACTIC ACID    Collection Time: 10/20/17  4:37 PM   Result Value Ref Range    Lactic acid 1.4 0.4 - 2.0 MMOL/L   CULTURE, BLOOD, PAIRED    Collection Time: 10/20/17  4:37 PM   Result Value Ref Range    Special Requests: NO SPECIAL REQUESTS      Culture result: NO GROWTH AFTER 12 HOURS     METABOLIC PANEL, BASIC    Collection Time: 10/21/17  3:22 AM   Result Value Ref Range    Sodium 133 (L) 136 - 145 mmol/L    Potassium 5.2 (H) 3.5 - 5.1 mmol/L    Chloride 87 (L) 97 - 108 mmol/L    CO2 33 (H) 21 - 32 mmol/L    Anion gap 13 5 - 15 mmol/L    Glucose 127 (H) 65 - 100 mg/dL     (H) 6 - 20 MG/DL    Creatinine 5.08 (H) 0.55 - 1.02 MG/DL    BUN/Creatinine ratio 23 (H) 12 - 20      GFR est AA 10 (L) >60 ml/min/1.73m2    GFR est non-AA 9 (L) >60 ml/min/1.73m2    Calcium 8.3 (L) 8.5 - 10.1 MG/DL   MAGNESIUM    Collection Time: 10/21/17  3:22 AM   Result Value Ref Range    Magnesium 2.5 (H) 1.6 - 2.4 mg/dL   PHOSPHORUS    Collection Time: 10/21/17  3:22 AM   Result Value Ref Range    Phosphorus 6.4 (H) 2.6 - 4.7 MG/DL    Raine Bailey MD 10/21/2017 _

## 2017-10-21 NOTE — PROGRESS NOTES
Problem: Pressure Injury - Risk of  Goal: *Prevention of pressure ulcer  Outcome: Progressing Towards Goal  Maintain Q2 turning schedule. Keeping linens dry. Moisture barrier to protect from incontinence. Will continue to monitor skin. Bedside and Verbal shift change report given to Josué Mixon (oncoming nurse) by Pamela Libman (offgoing nurse). Report included the following information SBAR, Kardex, Intake/Output, MAR, Recent Results and Cardiac Rhythm Paced.

## 2017-10-21 NOTE — PROGRESS NOTES
Hospitalist Progress Note        Date of Service:  10/21/2017  NAME:  Tania Champion  :  1955  MRN:  [id number]      Admission Summary: This is a 58-year-old  female with past medical history significant for chronic respiratory failure, home oxygen dependent 4 liters per minute via nasal cannula, COPD, morbid obesity, she uses Trilogy at home, hypertension, chronic systolic congestive heart failure, status post AICD, chronic kidney disease, who presented to [facility] Emergency Department with progressive shortness of breath one day ago. Interval history / Subjective:   Started HD yesterday, now requiring continuous bipap to maintain her O2 sats. Developed hypotension and  hypothermia last evening and was placed on a warming blanket and an infectious work-up was started. Hypotension improved after a small fluid bolus. She currently denies pain or dyspnea, no n/v/d. Hypothermia has since resolved. Assessment & Plan:     Acute on chronic hypercapnic respiratory failure: multifactorial due to TEAGAN/OHS, COPD, and CHF  -back on continuous bipap  -fluid removal with HD as BP tolerates  -continue duonebs, wean steroids      Acute on chronic systolic CHF, NYHA class II at baseline, IV on admission, s/p AICD. -CXR showed pulmonary edema  -ECHO c/w EF 25% with severe hypokinesis on 2017  -BP meds held due to borderline hypotension  -holding Aldactone and Lasix dose due to SANTOS  -she is not on ACE inhibitor or ARB due to SANTOS/CKD. -monitor I/O's, daily weight  -consulted cardiology  -started on milrinone on 10/19 briefly but was stopped  -fluid removal with HD as tolerated      Possible sepsis: developed hypotension and hypothermia on 10/20. She was briefly on a cooling blanket and BP normalized only after a 500 cc NS bolus.  Lactate wnl.  -continue vancomycin and cefepime for now (10/20-)  -f/u blood cultures, hasn't made urine for a urine culture    COPD with acute exacerbation. improved  -continue bronchodilators, steroids, BiPAP  -completed levofloxacin    SANTOS on CKD stage III: suspected due to diuresis. She was also hypotensive on 10/16 which may have led to ATN. My suspicion for cardiorenal is lower  -renal US without obstruction  -nephrology following - started HD on 10/20    Hypokalemia now developing hyperkalemia in the setting of renal failure  -monitor with HD     HTN  -holding BP meds     Hx of depression  -continue Zoloft     Morbid obesity     Legs ulcers, chronic venous stasis  -continue local wound care    Code status: Full code  DVT prophylaxis: sq heparin    Care Plan discussed with: Patient/Family, Nurse and   Disposition: TBD     Hospital Problems  Date Reviewed: 10/14/2017          Codes Class Noted POA    Acute respiratory failure ([facility] ICD-10-CM: J96.00  ICD-9-CM: 518.81  10/14/2017 Unknown        * (Principal)Acute on chronic respiratory failure ([facility] ICD-10-CM: J96.20  ICD-9-CM: 518.84  10/14/2017 Unknown              Vital Signs:    Last 24hrs VS reviewed since prior progress note.  Most recent are:  Visit Vitals    /67 (BP 1 Location: Right arm, BP Patient Position: At rest)    Pulse 64    Temp 97 °F (36.1 °C)    Resp 22    Ht 5' 7\" (1.702 m)    Wt (!) 209.6 kg (462 lb 1.4 oz)    SpO2 94%    BMI 72.37 kg/m2         Intake/Output Summary (Last 24 hours) at 10/21/17 0954  Last data filed at 10/20/17 2135   Gross per 24 hour   Intake                0 ml   Output             1000 ml   Net            -1000 ml        Physical Examination:     Gen: NAD, non-toxic, extremely obese  HEENT: BiPAP in place  Neck: supple, thick  Heart: RRR, distant heart sounds, no MRG appreciated, unable to assess JVD, 1+ BLE edema/lymphedema  Lungs: diminished breath sounds globally, normal work of breathing on bipap  Abd: soft, NT, ND, BS+, unable to assess organomegaly  Extr: warm, b/l LE lymphedema  Skin: LE superficial skin wounds dry, chronic venous stasis dermatitis of the lower extremities  Neuro: CN II-XII grossly intact, sleeping  Psych: calm, not anxious nor agitated       Data Review:    Review and/or order of clinical lab test      Labs:     Recent Labs      10/19/17   0319   WBC  8.3   HGB  12.9   HCT  41.1   PLT  185     Recent Labs      10/21/17   0322  10/20/17   0724  10/19/17   0319   NA  133*  133*  133*   K  5.2*  5.2*  4.9   CL  87*  87*  88*   CO2  33*  37*  34*   BUN  117*  113*  99*   CREA  5.08*  5.05*  4.13*   GLU  127*  146*  132*   CA  8.3*  8.5  8.7   MG  2.5*  2.5*  2.5*   PHOS  6.4*  7.0*  6.3*     No results for input(s): SGOT, GPT, ALT, AP, TBIL, TBILI, TP, ALB, GLOB, GGT, AML, LPSE in the last 72 hours. No lab exists for component: AMYP, HLPSE  No results for input(s): INR, PTP, APTT in the last 72 hours. No lab exists for component: INREXT, INREXT   No results for input(s): FE, TIBC, PSAT, FERR in the last 72 hours. No results found for: FOL, RBCF   No results for input(s): PH, PCO2, PO2 in the last 72 hours. No results for input(s): CPK, CKNDX, TROIQ in the last 72 hours.     No lab exists for component: CPKMB  No results found for: CHOL, CHOLX, CHLST, CHOLV, HDL, LDL, LDLC, DLDLP, TGLX, TRIGL, TRIGP, CHHD, CHHDX  Lab Results   Component Value Date/Time    Glucose (POC) 124 09/15/2017 09:56 PM     No results found for: COLOR, APPRN, SPGRU, REFSG, KARISSA, PROTU, GLUCU, KETU, BILU, UROU, CONSUELO, LEUKU, GLUKE, EPSU, BACTU, WBCU, RBCU, CASTS, UCRY      Medications Reviewed:     Current Facility-Administered Medications   Medication Dose Route Frequency    vancomycin (VANCOCIN) 500 mg in 0.9% sodium chloride (MBP/ADV) 100 mL  500 mg IntraVENous ONCE    vancomycin (VANCOCIN) 1,000 mg in 0.9% sodium chloride (MBP/ADV) 250 mL  1,000 mg IntraVENous ONCE    predniSONE (DELTASONE) tablet 30 mg  30 mg Oral DAILY WITH BREAKFAST    vancomycin - pharmacy to dose    Other Rx Dosing/Monitoring    cefepime (MAXIPIME) 2 g in 0.9% sodium chloride (MBP/ADV) 100 mL  2 g IntraVENous Q24H    albuterol-ipratropium (DUO-NEB) 2.5 MG-0.5 MG/3 ML  3 mL Nebulization BID RT    calcium carbonate (TUMS) chewable tablet 200 mg [elemental]  200 mg Oral PRN    influenza vaccine 2017-18 (3 yrs+)(PF) (FLUZONE QUAD/FLUARIX QUAD) injection 0.5 mL  0.5 mL IntraMUSCular PRIOR TO DISCHARGE    albuterol-ipratropium (DUO-NEB) 2.5 MG-0.5 MG/3 ML  3 mL Nebulization Q4H PRN    aspirin delayed-release tablet 81 mg  81 mg Oral DAILY    budesonide (PULMICORT) 500 mcg/2 ml nebulizer suspension  500 mcg Nebulization BID RT    fenofibrate nanocrystallized (TRICOR) tablet 48 mg  48 mg Oral DAILY    sertraline (ZOLOFT) tablet 100 mg  100 mg Oral DAILY    sodium chloride (NS) flush 5-10 mL  5-10 mL IntraVENous Q8H    sodium chloride (NS) flush 5-10 mL  5-10 mL IntraVENous PRN    ondansetron (ZOFRAN) injection 4 mg  4 mg IntraVENous Q4H PRN    heparin (porcine) injection 5,000 Units  5,000 Units SubCUTAneous Q8H    senna-docusate (PERICOLACE) 8.6-50 mg per tablet 1 Tab  1 Tab Oral QHS    polyethylene glycol (MIRALAX) packet 17 g  17 g Oral DAILY     ______________________________________________________________________  EXPECTED LENGTH OF STAY: 4d 12h  ACTUAL LENGTH OF STAY:          7                 Yehuda Kessler MD

## 2017-10-21 NOTE — CONSULTS
PCCM    Called for continuous bipap, unable to sustain oxygen saturation without bipap, looking for alternative oxygen therapies, CXR with interstitial edema and bilateral effusions in this patient with severe LV dysfunction and renal failure- HD initiated. ABG with compensated resp acidosis. Continue bipap qHS and prn during the day with breaks on hi flow for meals.  Hypoxemia should improve with volume removal    D/W MACARENA Ortega MD

## 2017-10-22 NOTE — DIALYSIS
[facility]  [phone]    Vitals   Pre   Post   Assessment   Pre   Post     Temp  Temp: 97.2 °F (36.2 °C) (10/22/17 0104)  97.2 LOC  AXOX3 AXOX3   HR   Pulse (Heart Rate): 60 (10/22/17 0205) 60 Lungs   Bipap use 90%, diminished bases  Unchanged   B/P   BP: 100/54 (10/22/17 0205) 91/48 Cardiac   Paced  Paced   Resp   Resp Rate: 17 (10/22/17 0104) 20 Skin   Generalized ecchymotic, LLE dsg C/D/I  Unchanged   Pain level  Pain Intensity 1: 0 (10/21/17 2329) Denies Edema    Generalized+2, BLE +3   Generalized   Orders:    Duration:   Start:   0100 End:   0410 Total:   3 hours   Dialyzer:   Dialyzer/Set Up Inspection: Revaclear (10/22/17 0104)   K Bath:   Dialysate K (mEq/L): 2 (10/22/17 0104)   Ca Bath:   Dialysate CA (mEq/L): 2.5 (10/22/17 0104)   Na/Bicarb:   Dialysate NA (mEq/L): 138 (10/22/17 0104)   Target Fluid Removal:   Goal/Amount of Fluid to Remove (mL): 2000 mL (10/22/17 0104)   Access     Type & Location:   R IJ CVC   Labs     Obtained/Reviewed   Critical Results Called   Date when labs were drawn-  Hgb-    HGB   Date Value Ref Range Status   10/19/2017 12.9 11.5 - 16.0 g/dL Final     K-    Potassium   Date Value Ref Range Status   10/21/2017 5.2 (H) 3.5 - 5.1 mmol/L Final     Ca-   Calcium   Date Value Ref Range Status   10/21/2017 8.3 (L) 8.5 - 10.1 MG/DL Final     Bun-   BUN   Date Value Ref Range Status   10/21/2017 117 (H) 6 - 20 MG/DL Final     Creat-   Creatinine   Date Value Ref Range Status   10/21/2017 5.08 (H) 0.55 - 1.02 MG/DL Final        Medications/ Blood Products Given     Name   Dose   Route and Time     None                Blood Volume Processed (BVP):    51.4 Net Fluid   Removed:  2000 ml   Comments   Time Out Done: Yes 5654  Primary Nurse Rpt Pre: Melissa Roland RN  Primary Nurse Rpt Post: Melissa Roland RN  Pt Education:Procedure, access care. .  Care Plan: 3 rd tx as ordered by nephrologists.   Tx Summary:Pt tolerated 2nd tx fair, denied discomfort, All possible blood returned EOT, ports NS flushed x 2,new caps applied, clamps secured, dsg C/D/I dated 10/20/17, biopatch is visible. Pt remains in bed in NAD.   Admiting Diagnosis:  Pt's previous clinic-  Consent signed - Informed Consent Verified: Yes (10/22/17 0104)  [facility] Consent - Yes  Hepatitis Status- Unknown, pending 10/21/17  Machine #- Machine Number: [id number] (10/22/17 0104)  Telemetry status-Monitor  Pre-dialysis wt.- 180.2 kg (10/22/17)

## 2017-10-22 NOTE — PROGRESS NOTES
Spoke with Dr. Alvina Mcfarland concerning patient being increased to 100%O2 by respiratory for BIPAP. This was done because patient was desating. Concern for patient eating and drinking as pt desats as soon as mask is taken from face. Per MD he doesn't want to do any alternative nutrition at this point due to possible increasing fluid overload and that staff should give oral medication. Per MD patient should not be eating and drinking on continuous bipap, however, when asked if he wanted to do order for NPO he stated no. At this point  continue with care as ordered. shift change report given to Mac Simon (oncoming nurse) by Carlos Garcia RN (offgoing nurse). Report included the following information SBAR, Kardex, Intake/Output, MAR and Recent Results.

## 2017-10-22 NOTE — PROGRESS NOTES
Hospitalist Progress Note        Date of Service:  10/22/2017  NAME:  Stanton Hansen  :  1955  MRN:  [id number]      Admission Summary: This is a 51-year-old  female with past medical history significant for chronic respiratory failure, home oxygen dependent 4 liters per minute via nasal cannula, COPD, morbid obesity, she uses Trilogy at home, hypertension, chronic systolic congestive heart failure, status post AICD, chronic kidney disease, who presented to [facility] Emergency Department with progressive shortness of breath one day ago. Interval history / Subjective:   Not much change. Remains largely bipap dependent. Desats when off bipap. She denies any pain, breathing comfortably on bipap but feels a bit dyspneic when taken off. No n/v. Was having diarrhea yesterday and C diff has been ordered but no specimen was sent. Also c/o mouth soreness. Assessment & Plan:     Acute on chronic hypercapnic respiratory failure: multifactorial due to TEAGAN/OHS, COPD, and CHF  -BiPAP with intermittent high flow  -fluid removal with HD as BP tolerates  -continue duonebs, steroids being tapered off      Acute on chronic systolic CHF, NYHA class II at baseline, IV on admission, s/p AICD. -CXR showed pulmonary edema  -ECHO c/w EF 25% with severe hypokinesis on 2017  -BP meds held due to borderline hypotension  -holding Aldactone and Lasix dose due to ARF  -she is not on ACE inhibitor or ARB due to ARF/CKD. -monitor I/O's, daily weight  -consulted cardiology  -was on milrinone on 10/19 briefly but was stopped  -fluid removal with HD as tolerated      Possible sepsis: developed hypotension and hypothermia on 10/20. She was briefly on a cooling blanket and BP normalized only after a 500 cc NS bolus.  Lactate wnl.  -continue vancomycin and cefepime for now (10/20-)  -f/u blood cultures, hasn't made urine for a urine culture    COPD with acute exacerbation. improved  -continue bronchodilators, steroids, BiPAP  -completed levofloxacin    SANTOS on CKD stage III: suspected due to diuresis. She was also hypotensive on 10/16 which may have led to ATN. My suspicion for cardiorenal is lower  -renal US without obstruction  -nephrology following - started HD on 10/20    Diarrhea: C diff requested, no specimen sent yet    Sore mouth: due to bipap/xerostomia; trial of magic mouthwash    Hypokalemia now developing hyperkalemia in the setting of renal failure  -monitor with HD     Hyperphosphatemia: correct with HD    HTN  -holding BP meds     Hx of depression  -continue Zoloft     Morbid obesity     Legs ulcers, chronic venous stasis  -continue local wound care    Code status: Full code  DVT prophylaxis: sq heparin    Care Plan discussed with: Patient/Family, Nurse and   Disposition: TBD     Hospital Problems  Date Reviewed: 10/14/2017          Codes Class Noted POA    Acute respiratory failure ([facility] [address] ICD-10-CM: J96.00  ICD-9-CM: 518.81  10/14/2017 Unknown        * (Principal)Acute on chronic respiratory failure ([facility] [address] ICD-10-CM: J96.20  ICD-9-CM: 518.84  10/14/2017 Unknown              Vital Signs:    Last 24hrs VS reviewed since prior progress note.  Most recent are:  Visit Vitals    BP 91/45 (BP 1 Location: Right arm, BP Patient Position: At rest;Supine)    Pulse 61    Temp 97.3 °F (36.3 °C)    Resp 19    Ht 5' 7\" (1.702 m)    Wt (!) 189 kg (416 lb 10.7 oz)    SpO2 90%    BMI 65.26 kg/m2         Intake/Output Summary (Last 24 hours) at 10/22/17 1123  Last data filed at 10/22/17 0418   Gross per 24 hour   Intake                0 ml   Output             2000 ml   Net            -2000 ml        Physical Examination:   Largely unchanged  Gen: NAD, non-toxic, extremely obese  HEENT: BiPAP in place  Neck: supple, thick  Heart: RRR, distant heart sounds, no MRG appreciated, unable to assess JVD, 1+ BLE edema/lymphedema  Lungs: diminished breath sounds globally, normal work of breathing on bipap  Abd: soft, NT, ND, BS+, unable to assess organomegaly  Extr: warm, b/l LE lymphedema  Skin: LE superficial skin wounds dry, chronic venous stasis dermatitis of the lower extremities  Neuro: CN II-XII grossly intact  Psych: calm, not anxious nor agitated       Data Review:    Review and/or order of clinical lab test      Labs:     Recent Labs      10/22/17   0535   WBC  8.9   HGB  12.2   HCT  35.1   PLT  125*     Recent Labs      10/22/17   0535  10/21/17   0322  10/20/17   0724   NA  133*  133*  133*   K  5.3*  5.2*  5.2*   CL  91*  87*  87*   CO2  29  33*  37*   BUN  104*  117*  113*   CREA  5.22*  5.08*  5.05*   GLU  120*  127*  146*   CA  8.4*  8.3*  8.5   MG  2.5*  2.5*  2.5*   PHOS  6.2*  6.4*  7.0*     No results for input(s): SGOT, GPT, ALT, AP, TBIL, TBILI, TP, ALB, GLOB, GGT, AML, LPSE in the last 72 hours. No lab exists for component: AMYP, HLPSE  No results for input(s): INR, PTP, APTT in the last 72 hours. No lab exists for component: INREXT, INREXT   No results for input(s): FE, TIBC, PSAT, FERR in the last 72 hours. No results found for: FOL, RBCF   No results for input(s): PH, PCO2, PO2 in the last 72 hours. No results for input(s): CPK, CKNDX, TROIQ in the last 72 hours.     No lab exists for component: CPKMB  No results found for: CHOL, CHOLX, CHLST, CHOLV, HDL, LDL, LDLC, DLDLP, TGLX, TRIGL, TRIGP, CHHD, CHHDX  Lab Results   Component Value Date/Time    Glucose (POC) 124 09/15/2017 09:56 PM     No results found for: COLOR, APPRN, SPGRU, REFSG, KARISSA, PROTU, GLUCU, KETU, BILU, UROU, CONSUELO, LEUKU, GLUKE, EPSU, BACTU, WBCU, RBCU, CASTS, UCRY      Medications Reviewed:     Current Facility-Administered Medications   Medication Dose Route Frequency    Vancomycin random Level today   Other ONCE    [START ON 10/23/2017] predniSONE (DELTASONE) tablet 20 mg  20 mg Oral DAILY WITH BREAKFAST    lactobac ac& pc-s.therm-b.anim (TRACY Q/RISAQUAD)  1 Cap Oral DAILY    vancomycin - pharmacy to dose    Other Rx Dosing/Monitoring    cefepime (MAXIPIME) 2 g in 0.9% sodium chloride (MBP/ADV) 100 mL  2 g IntraVENous Q24H    albuterol-ipratropium (DUO-NEB) 2.5 MG-0.5 MG/3 ML  3 mL Nebulization BID RT    calcium carbonate (TUMS) chewable tablet 200 mg [elemental]  200 mg Oral PRN    influenza vaccine 2017-18 (3 yrs+)(PF) (FLUZONE QUAD/FLUARIX QUAD) injection 0.5 mL  0.5 mL IntraMUSCular PRIOR TO DISCHARGE    albuterol-ipratropium (DUO-NEB) 2.5 MG-0.5 MG/3 ML  3 mL Nebulization Q4H PRN    aspirin delayed-release tablet 81 mg  81 mg Oral DAILY    budesonide (PULMICORT) 500 mcg/2 ml nebulizer suspension  500 mcg Nebulization BID RT    fenofibrate nanocrystallized (TRICOR) tablet 48 mg  48 mg Oral DAILY    sertraline (ZOLOFT) tablet 100 mg  100 mg Oral DAILY    sodium chloride (NS) flush 5-10 mL  5-10 mL IntraVENous Q8H    sodium chloride (NS) flush 5-10 mL  5-10 mL IntraVENous PRN    ondansetron (ZOFRAN) injection 4 mg  4 mg IntraVENous Q4H PRN    heparin (porcine) injection 5,000 Units  5,000 Units SubCUTAneous Q8H    senna-docusate (PERICOLACE) 8.6-50 mg per tablet 1 Tab  1 Tab Oral QHS     ______________________________________________________________________  EXPECTED LENGTH OF STAY: 4d 12h  ACTUAL LENGTH OF STAY:          8                 Yehuda Kessler MD

## 2017-10-22 NOTE — PROGRESS NOTES
10/22/2017    Naheed Guzmán MD   [phone] PCP:-Richard Ruiz MD   [facility]  -Progress Note     . Janiya Thomas Andrew Serve is a 58 y.o. female   No new complaints  Continued use of Bipap and not making much progress there, not sure what parameters  No complaints  Denies chest pain, heart palpitations , increasing edema, pre-syncope   No problems overnight, rhythm and hemodynamics stable -see vitals below  Remains on Bipap    1: CHF, systolic chronic                        - NYHA class II -III: difficult to assess with hypoxia related to COPD exacerbation.                         - EF 25% on echo 9/2017                        - Coreg 6.25 mg BID                        - No ACE/ARB 2/2 CKD                        - Hydralazine 25 mg TID - stopped                        - Isordil 10 mg PO TID - stopped                        - Diuretics stopped due to SANTOS                        - S/p AICD at [facility]      2: Cardiomyopathy with LVEF 25%  We changed mode on AICD to avoid underpacing, try increase perfusion pressure-see EP note                        - AICD in place - wide complex -                         - GDT meds on board, Aldactone held  3: COPD.                       - Per Primary team                        - Trilogy for home, BIPAP here                                  - She will need an appropriately fitting mask  4. A/C hypercapnic hypoxic respiratory failure                                  - BIPAP prn                                  - Trilogy at home, needs new mask. Will Consult CM to have [address] bring a few masks for fitting once discharged home  5: SANTOS on CRF, presumably diuretics                        - Seem like baseline 1.5-1. 8                                 -   Lab Results   Component Value Date/Time    Creatinine 5.22 10/22/2017 05:35 AM     -startedHD  following non tunneled catheter placement                                  - Nephrology following -                                  -   6. HTN                                  - hypotension currently                                  - stopped Hydralazine and Isordil - would like to keep BP a little higher to help with renal perfusion. 7. Body mass index is 69.44 kg/(m^2).                       Morbidly obese.       overall continue hemodialysis to reduce volume and then wean bipap if possible  EF 25% but unable to use much in way of CHF meds due to contraindications   1. beta blocker stopped due to wheezing, airway-COPD-Pickwickian  2. ACE stopped due to SANTOS  3. Diuretics stopped due to SANTOS    We will check back on her PRN   Thank you  Other co-morbids:    has a past medical history of Chronic obstructive pulmonary disease ([facility] [address] Heart failure ([facility] [address] Hypertension; and Obesity. Cardiac Studies/Hx:  No specialty comments available. Past Medical History:   Diagnosis Date    Chronic obstructive pulmonary disease ([facility] [address]     Heart failure ([facility] [address]     Hypertension     Obesity       ROS-pertinents  negative except as above  The pertinent portions of the medical history,physician and nursing notes, meds,vitals , labs and Ins/Outs,are reviewed in the electronic record. Objective:    Physical Exam:   Vitals:    10/22/17 0340 10/22/17 0418 10/22/17 0742 10/22/17 0921   BP: 99/49 91/45     BP 1 Location:  Right arm     BP Patient Position:  At rest;Supine     Pulse: 61 61     Resp:  19     Temp:  97.3 °F (36.3 °C)     TempSrc:       SpO2:  90% 93% 90%   Weight:  (!) 416 lb 10.7 oz (189 kg)  Comment: hob flat, one blanket, one pillow.      Height:         Patient Vitals for the past 12 hrs:   Temp Pulse Resp BP SpO2   10/22/17 0921 - - - - 90 %   10/22/17 0742 - - - - 93 %   10/22/17 0418 97.3 °F (36.3 °C) 61 19 91/45 90 %   10/22/17 0340 - 61 - 99/49 -   10/22/17 0308 - 62 - 103/57 -   10/22/17 0235 - 61 - 97/51 -   10/22/17 0205 - 60 - 100/54 -   10/22/17 0135 - 61 - 97/45 -   10/22/17 0104 97.2 °F (36.2 °C) 62 17 97/55 92 %      Constitutional:  Overweight and morbidly obese , truncal obesity is severeand well-nourished. On bipap in chronic  distress. HENT: Head: Normocephalic. Eyes: No scleral icterus. Neck:  Neck obese/ supple. No JVD present. No tracheal deviation present. Pulmonary/Chest: Effort normal and breath sounds normal. No stridor. No respiratory distress, wheezes or rales. Cardiovascular: Normal rate, regular rhythm, normal heart sounds . Exam reveals no gallop and no friction rub. No murmur heard. PMI non displaced. Extremities: 1+ edema with chronic venous stasis disease at shins  Abdominal:   no abnormal distension. Neurological:  alert and oriented. Coordination seems grossly normal.   Skin: Skin is not cold. No rash noted. Not diaphoretic. No erythema. Psychiatric:  Grossly normal mood and affect.   Behavior appears normal.    Last 24hr Input/Output:    Intake/Output Summary (Last 24 hours) at 10/22/17 1227  Last data filed at 10/22/17 0418   Gross per 24 hour   Intake                0 ml   Output             2000 ml   Net            -2000 ml        Data Review:   Recent Results (from the past 24 hour(s))   POC G3 - PUL    Collection Time: 10/21/17  3:51 PM   Result Value Ref Range    pH (POC) 7.366 7.35 - 7.45      pCO2 (POC) 64.9 (H) 35.0 - 45.0 MMHG    pO2 (POC) 75 (L) 80 - 100 MMHG    HCO3 (POC) 37.2 (H) 22 - 26 MMOL/L    sO2 (POC) 94 92 - 97 %    Base excess (POC) 12 mmol/L    Site RIGHT RADIAL      Device: BIPAP      PEEP/CPAP (POC) 6 cmH2O    PIP (POC) 13      Pressure support 12 cmH2O    Allens test (POC) YES      Bleed In 50 L/min    Specimen type (POC) ARTERIAL     CBC W/O DIFF    Collection Time: 10/22/17  5:35 AM   Result Value Ref Range    WBC 8.9 3.6 - 11.0 K/uL    RBC 3.91 3.80 - 5.20 M/uL    HGB 12.2 11.5 - 16.0 g/dL    HCT 35.1 35.0 - 47.0 %    MCV 89.8 80.0 - 99.0 FL    MCH 31.2 26.0 - 34.0 PG    MCHC 34.8 30.0 - 36.5 g/dL    RDW 19.5 (H) 11.5 - 14.5 %    PLATELET 644 (L) 099 - 228 K/uL   METABOLIC PANEL, BASIC    Collection Time: 10/22/17  5:35 AM   Result Value Ref Range    Sodium 133 (L) 136 - 145 mmol/L    Potassium 5.3 (H) 3.5 - 5.1 mmol/L    Chloride 91 (L) 97 - 108 mmol/L    CO2 29 21 - 32 mmol/L    Anion gap 13 5 - 15 mmol/L    Glucose 120 (H) 65 - 100 mg/dL     (H) 6 - 20 MG/DL    Creatinine 5.22 (H) 0.55 - 1.02 MG/DL    BUN/Creatinine ratio 20 12 - 20      GFR est AA 10 (L) >60 ml/min/1.73m2    GFR est non-AA 8 (L) >60 ml/min/1.73m2    Calcium 8.4 (L) 8.5 - 10.1 MG/DL   MAGNESIUM    Collection Time: 10/22/17  5:35 AM   Result Value Ref Range    Magnesium 2.5 (H) 1.6 - 2.4 mg/dL   PHOSPHORUS    Collection Time: 10/22/17  5:35 AM   Result Value Ref Range    Phosphorus 6.2 (H) 2.6 - 4.7 MG/DL    Naheed Guzmán MD 10/22/2017 _

## 2017-10-22 NOTE — PROGRESS NOTES
Problem: Breathing Pattern - Ineffective  Goal: *Absence of hypoxia  Outcome: Progressing Towards Goal  Monitor patients breathing pattern, respiratory rate and oxygen saturation  Monitor vital signs as ordered  Assess breath sounds

## 2017-10-22 NOTE — PROGRESS NOTES
Patient was put on Hi Flow for 10 minutes. She couldn't maintain an Spo2 above 86. She would reach as high a 90 and fall back in the 80's. She started to complain of choking and not being able to breath. Do to her size, she is a mouth breather and couldn't pull the air in without breathing through her mouth. I turned the flow as high as 60 liters but it didn't fix the issue.  She was put back on BiPap  14/6--- 50%

## 2017-10-22 NOTE — PROGRESS NOTES
Pharmacist Note - Vancomycin Dosing  Therapy day 3  Indication: Possible sepsis (unclear etiology)  Current regimen:  10/21   500 mg dose @ 1100 , 1 gram @ 2200 (pre-dialysis )    A Random Level resulted at 17.8 mcg/mL which was obtained 7 hrs post dialysis    therapeutic goal of 15 - 20 mcg/mL  post dialysis  Plan: Will order Vancomycin 1500 mg post dialysis  in am 10/23   Pharmacy will continue to monitor this patient daily for changes in clinical status and renal function.

## 2017-10-22 NOTE — PROGRESS NOTES
[facility]   [address] [facility], [address] [place] [address], [facility]  Phone: [phone]   [phone]       Nephrology Progress Note  Laurie Lang     [birth year]     [id number]  Date of Admission : 10/14/2017  10/22/17    CC: Follow up for SANTOS     Assessment and Plan   SANTOS on CKD :  - from ATN  - no recovery yet  - HD #3 early tomorrow AM    Hypotension:  - on abx  - sepsis w/u per primary team  - not requiring pessors    CKD Stage III   - baseline Cr 1.5 mg /dl     Bradycardia:  - pacer reprogrammed  - will need upgrade to BiV once stable    Chronic Systolic CHF w/ EF 40% with AICD    COPD excaerbation     TEAGAN     Interval History:  Seen and examined. Remains dependent on BiPAP. Her 2nd HD treatment was done early this AM.  2 liters of UF. Minimal UOP, dark, concentrated urine in urometer this am.    Review of Systems: Review of systems not obtained due to patient factors.     Current Medications:   Current Facility-Administered Medications   Medication Dose Route Frequency    Vancomycin random Level today   Other ONCE    [START ON 10/23/2017] predniSONE (DELTASONE) tablet 20 mg  20 mg Oral DAILY WITH BREAKFAST    lactobac ac& pc-s.therm-b.anim (TRACY Q/RISAQUAD)  1 Cap Oral DAILY    vancomycin - pharmacy to dose    Other Rx Dosing/Monitoring    cefepime (MAXIPIME) 2 g in 0.9% sodium chloride (MBP/ADV) 100 mL  2 g IntraVENous Q24H    albuterol-ipratropium (DUO-NEB) 2.5 MG-0.5 MG/3 ML  3 mL Nebulization BID RT    calcium carbonate (TUMS) chewable tablet 200 mg [elemental]  200 mg Oral PRN    influenza vaccine 2017-18 (3 yrs+)(PF) (FLUZONE QUAD/FLUARIX QUAD) injection 0.5 mL  0.5 mL IntraMUSCular PRIOR TO DISCHARGE    albuterol-ipratropium (DUO-NEB) 2.5 MG-0.5 MG/3 ML  3 mL Nebulization Q4H PRN    aspirin delayed-release tablet 81 mg  81 mg Oral DAILY    budesonide (PULMICORT) 500 mcg/2 ml nebulizer suspension  500 mcg Nebulization BID RT    fenofibrate nanocrystallized (TRICOR) tablet 48 mg  48 mg Oral DAILY    sertraline (ZOLOFT) tablet 100 mg  100 mg Oral DAILY    sodium chloride (NS) flush 5-10 mL  5-10 mL IntraVENous Q8H    sodium chloride (NS) flush 5-10 mL  5-10 mL IntraVENous PRN    ondansetron (ZOFRAN) injection 4 mg  4 mg IntraVENous Q4H PRN    heparin (porcine) injection 5,000 Units  5,000 Units SubCUTAneous Q8H    senna-docusate (PERICOLACE) 8.6-50 mg per tablet 1 Tab  1 Tab Oral QHS      No Known Allergies    Objective:  Vitals:    Vitals:    10/22/17 0340 10/22/17 0418 10/22/17 0742 10/22/17 0921   BP: 99/49 91/45     Pulse: 61 61     Resp:  19     Temp:  97.3 °F (36.3 °C)     TempSrc:       SpO2:  90% 93% 90%   Weight:  (!) 189 kg (416 lb 10.7 oz)     Height:         Intake and Output:     10/20 1901 - 10/22 0700  In: -   Out: 3000     Physical Examination:  General:morbid obesity, on BiPAP, appears comfortable  HEENT: Eyes are PERRL.  Conjunctiva without pallor ,erythema.  The sclerae without icterus. .   Neck:Supple,no mass palpable  Lungs : distant sounds  CVS: RRR, S1 S2 normal, No rub,  chronic LE edema with venous stasis changes  Abdomen: obese, Non tender, No hepatosplenomegaly, bowel sounds present  Extremities: No cyanosis, No clubbing  Skin: No rash or lesions  Neurologic: non focal, AAO x 3  Psych: normal affect       []    High complexity decision making was performed  []    Patient is at high-risk of decompensation with multiple organ involvement    Lab Data Personally Reviewed: I have reviewed all the pertinent labs, microbiology data and radiology studies during assessment.     Recent Labs      10/22/17   0535  10/21/17   0322  10/20/17   0724   NA  133*  133*  133*   K  5.3*  5.2*  5.2*   CL  91*  87*  87*   CO2  29  33*  37*   GLU  120*  127*  146*   BUN  104*  117*  113*   CREA  5.22*  5.08*  5.05*   CA  8.4*  8.3*  8.5   MG  2.5*  2.5*  2.5*   PHOS  6.2*  6.4*  7.0*     Recent Labs      10/22/17   0535   WBC  8.9   HGB  12.2   HCT  35.1   PLT  125*     No results found for: SDES  Lab Results   Component Value Date/Time    Culture result: NO GROWTH 2 DAYS 10/20/2017 04:37 PM    Culture result: NO GROWTH 5 DAYS 10/14/2017 04:18 PM     Recent Results (from the past 24 hour(s))   POC G3 - PUL    Collection Time: 10/21/17  3:51 PM   Result Value Ref Range    pH (POC) 7.366 7.35 - 7.45      pCO2 (POC) 64.9 (H) 35.0 - 45.0 MMHG    pO2 (POC) 75 (L) 80 - 100 MMHG    HCO3 (POC) 37.2 (H) 22 - 26 MMOL/L    sO2 (POC) 94 92 - 97 %    Base excess (POC) 12 mmol/L    Site RIGHT RADIAL      Device: BIPAP      PEEP/CPAP (POC) 6 cmH2O    PIP (POC) 13      Pressure support 12 cmH2O    Allens test (POC) YES      Bleed In 50 L/min    Specimen type (POC) ARTERIAL     CBC W/O DIFF    Collection Time: 10/22/17  5:35 AM   Result Value Ref Range    WBC 8.9 3.6 - 11.0 K/uL    RBC 3.91 3.80 - 5.20 M/uL    HGB 12.2 11.5 - 16.0 g/dL    HCT 35.1 35.0 - 47.0 %    MCV 89.8 80.0 - 99.0 FL    MCH 31.2 26.0 - 34.0 PG    MCHC 34.8 30.0 - 36.5 g/dL    RDW 19.5 (H) 11.5 - 14.5 %    PLATELET 512 (L) 971 - 284 K/uL   METABOLIC PANEL, BASIC    Collection Time: 10/22/17  5:35 AM   Result Value Ref Range    Sodium 133 (L) 136 - 145 mmol/L    Potassium 5.3 (H) 3.5 - 5.1 mmol/L    Chloride 91 (L) 97 - 108 mmol/L    CO2 29 21 - 32 mmol/L    Anion gap 13 5 - 15 mmol/L    Glucose 120 (H) 65 - 100 mg/dL     (H) 6 - 20 MG/DL    Creatinine 5.22 (H) 0.55 - 1.02 MG/DL    BUN/Creatinine ratio 20 12 - 20      GFR est AA 10 (L) >60 ml/min/1.73m2    GFR est non-AA 8 (L) >60 ml/min/1.73m2    Calcium 8.4 (L) 8.5 - 10.1 MG/DL   MAGNESIUM    Collection Time: 10/22/17  5:35 AM   Result Value Ref Range    Magnesium 2.5 (H) 1.6 - 2.4 mg/dL   PHOSPHORUS    Collection Time: 10/22/17  5:35 AM   Result Value Ref Range    Phosphorus 6.2 (H) 2.6 - 4.7 MG/DL       I have reviewed the flowsheets. Chart and Pertinent Notes have been reviewed. No change in PMH ,family and social history from Consult note.       Shruthi Bello, MD

## 2017-10-23 NOTE — PROGRESS NOTES
[facility]   [address] [address], [facility] [address], [facility]  Phone: [phone]   [phone]       Nephrology Progress Note  Diamante Healy     [birth year]     [id number]  Date of Admission : 10/14/2017  10/23/17    CC: Follow up for SANTOS     Assessment and Plan   SANTOS on CKD :  - from ATN  - no recovery yet  - switch to CVVH given her hemodynamic instability  - factor as tolerated  - daily labs    Hypotension:  - ? Cardiac  - infectious w/u neg  - will need pressors    Hypervolemia:  - difficult to assess volume given her body habitus  - would benefit from central line and CVP monitoring in the ICU    CKD Stage III   - baseline Cr 1.5 mg /dl     Bradycardia:  - pacer reprogrammed  - will need upgrade to BiV once stable    Chronic Systolic CHF w/ EF 60% with AICD    COPD excaerbation     TEAGAN     Interval History:  Seen and examined. Decompensating overnight. Hypotensive, dependent on BiPAP. Remains anuric. Did not respond to fluid bolus. For transfer to ICU today. Review of Systems: Review of systems not obtained due to patient factors.     Current Medications:   Current Facility-Administered Medications   Medication Dose Route Frequency    hydrocortisone Sod Succ (PF) (SOLU-CORTEF) injection 50 mg  50 mg IntraVENous Q6H    magic mouthwash cpd (without sucralfate)  5 mL Oral TIDAC    lactobac ac& pc-s.therm-b.anim (TRACY Q/RISAQUAD)  1 Cap Oral DAILY    vancomycin - pharmacy to dose    Other Rx Dosing/Monitoring    cefepime (MAXIPIME) 2 g in 0.9% sodium chloride (MBP/ADV) 100 mL  2 g IntraVENous Q24H    albuterol-ipratropium (DUO-NEB) 2.5 MG-0.5 MG/3 ML  3 mL Nebulization BID RT    calcium carbonate (TUMS) chewable tablet 200 mg [elemental]  200 mg Oral PRN    influenza vaccine 2017-18 (3 yrs+)(PF) (FLUZONE QUAD/FLUARIX QUAD) injection 0.5 mL  0.5 mL IntraMUSCular PRIOR TO DISCHARGE    albuterol-ipratropium (DUO-NEB) 2.5 MG-0.5 MG/3 ML  3 mL Nebulization Q4H PRN    aspirin delayed-release tablet 81 mg  81 mg Oral DAILY    budesonide (PULMICORT) 500 mcg/2 ml nebulizer suspension  500 mcg Nebulization BID RT    fenofibrate nanocrystallized (TRICOR) tablet 48 mg  48 mg Oral DAILY    sertraline (ZOLOFT) tablet 100 mg  100 mg Oral DAILY    sodium chloride (NS) flush 5-10 mL  5-10 mL IntraVENous Q8H    sodium chloride (NS) flush 5-10 mL  5-10 mL IntraVENous PRN    ondansetron (ZOFRAN) injection 4 mg  4 mg IntraVENous Q4H PRN    heparin (porcine) injection 5,000 Units  5,000 Units SubCUTAneous Q8H      No Known Allergies    Objective:  Vitals:    Vitals:    10/23/17 0149 10/23/17 0322 10/23/17 0519 10/23/17 0841   BP: 90/53 92/58 (!) 76/51    Pulse:  60 60    Resp:  19     Temp:  (!) 94 °F (34.4 °C) (!) 94.4 °F (34.7 °C)    TempSrc:       SpO2:  96%  93%   Weight:  (!) 199 kg (438 lb 11.5 oz)     Height:         Intake and Output:     10/21 1901 - 10/23 0700  In: -   Out: 2130 [Urine:130]    Physical Examination:  General:morbid obesity, on BiPAP, appears comfortable  HEENT: Eyes are PERRL.  Conjunctiva without pallor ,erythema.  The sclerae without icterus. .   Neck:Supple,no mass palpable  Lungs : distant sounds  CVS: RRR, S1 S2 normal, No rub,  chronic LE edema with venous stasis changes  Abdomen: obese, Non tender, No hepatosplenomegaly, bowel sounds present  Extremities: No cyanosis, No clubbing  Skin: No rash or lesions  Neurologic: non focal, AAO x 3  Psych: normal affect       []    High complexity decision making was performed  []    Patient is at high-risk of decompensation with multiple organ involvement    Lab Data Personally Reviewed: I have reviewed all the pertinent labs, microbiology data and radiology studies during assessment.     Recent Labs      10/23/17   0330  10/22/17   0535  10/21/17   0322   NA  131*  133*  133*   K  5.6*  5.3*  5.2*   CL  90*  91*  87*   CO2  26  29  33*   GLU  93  120*  127*   BUN  125*  104*  117*   CREA  6.17*  5.22*  5.08*   CA  8.6  8.4* 8.3*   MG  2.6*  2.5*  2.5*   PHOS  7.4*  6.2*  6.4*     Recent Labs      10/23/17   0330  10/22/17   0535   WBC  9.3  8.9   HGB  13.0  12.2   HCT  39.0  35.1   PLT  93*  125*     No results found for: SDES  Lab Results   Component Value Date/Time    Culture result: NO GROWTH 3 DAYS 10/20/2017 04:37 PM    Culture result: NO GROWTH 5 DAYS 10/14/2017 04:18 PM     Recent Results (from the past 24 hour(s))   VANCOMYCIN, RANDOM    Collection Time: 10/22/17 11:10 AM   Result Value Ref Range    Vancomycin, random 17.8 UG/ML   CBC W/O DIFF    Collection Time: 10/23/17  3:30 AM   Result Value Ref Range    WBC 9.3 3.6 - 11.0 K/uL    RBC 4.30 3.80 - 5.20 M/uL    HGB 13.0 11.5 - 16.0 g/dL    HCT 39.0 35.0 - 47.0 %    MCV 90.7 80.0 - 99.0 FL    MCH 30.2 26.0 - 34.0 PG    MCHC 33.3 30.0 - 36.5 g/dL    RDW 20.4 (H) 11.5 - 14.5 %    PLATELET 93 (L) 666 - 193 K/uL   METABOLIC PANEL, BASIC    Collection Time: 10/23/17  3:30 AM   Result Value Ref Range    Sodium 131 (L) 136 - 145 mmol/L    Potassium 5.6 (H) 3.5 - 5.1 mmol/L    Chloride 90 (L) 97 - 108 mmol/L    CO2 26 21 - 32 mmol/L    Anion gap 15 5 - 15 mmol/L    Glucose 93 65 - 100 mg/dL     (H) 6 - 20 MG/DL    Creatinine 6.17 (H) 0.55 - 1.02 MG/DL    BUN/Creatinine ratio 20 12 - 20      GFR est AA 8 (L) >60 ml/min/1.73m2    GFR est non-AA 7 (L) >60 ml/min/1.73m2    Calcium 8.6 8.5 - 10.1 MG/DL   MAGNESIUM    Collection Time: 10/23/17  3:30 AM   Result Value Ref Range    Magnesium 2.6 (H) 1.6 - 2.4 mg/dL   PHOSPHORUS    Collection Time: 10/23/17  3:30 AM   Result Value Ref Range    Phosphorus 7.4 (H) 2.6 - 4.7 MG/DL   NUCLEATED RBC    Collection Time: 10/23/17  3:30 AM   Result Value Ref Range    NRBC 7.0 (H) 0  WBC    ABSOLUTE NRBC 0.65 (H) 0.00 - 0.01 K/uL    WBC CORRECTED FOR NR ADJUSTED FOR NUCLEATED RBC'S     GLUCOSE, POC    Collection Time: 10/23/17  5:41 AM   Result Value Ref Range    Glucose (POC) 87 65 - 100 mg/dL    Performed by Jewell Davis    LACTIC ACID Collection Time: 10/23/17  9:21 AM   Result Value Ref Range    Lactic acid 2.8 (HH) 0.4 - 2.0 MMOL/L       I have reviewed the flowsheets. Chart and Pertinent Notes have been reviewed. No change in PMH ,family and social history from Consult note.       Desiree Magdaleno MD

## 2017-10-23 NOTE — PROGRESS NOTES
Responded to overhead code blue in room 406. Arrived to find a large number of staff gathered inside and outside of room. Offered a silent prayer for the patient and staff. No family members were observed and staff noted that no family were present to their knowledge. Ms. Wing Torres is a Palliative Medicine patient--so attempted to contact [facility]. Remained present during code and until the code was completed. Spoke with Dr. Burnadette Leyden who agreed to call with notification of family's intention to visit. He later called to inform that he was unable to make contact with any family members. Will remain available to meet with family as needed. [facility]'s Staff  (Shannon Layne Patient Care Specialist)   Paging Service [phone])

## 2017-10-23 NOTE — PROGRESS NOTES
Hospitalist Progress Note        Date of Service:  10/23/2017  NAME:  Amrit Smith  :  1955  MRN:  [id number]      Admission Summary: This is a 71-year-old  female with past medical history significant for chronic respiratory failure, home oxygen dependent 4 liters per minute via nasal cannula, COPD, morbid obesity, she uses Trilogy at home, hypertension, chronic systolic congestive heart failure, status post AICD, chronic kidney disease, who presented to [facility] Emergency Department with progressive shortness of breath one day ago. Interval history / Subjective:   Hypotensive, hypoxic and hypothermic overnight. Received 500 IVNS with mild improvement of hypotension. Unable to come off BiPAP and now on 100% FiO2. She told me she feels \"pretty good,\" though she is more lethargic than before. Assessment & Plan:     Acute on chronic hypercapnic respiratory failure: multifactorial due to TEAGAN/OHS, COPD, and CHF  -now BiPAP dependent  -will not tolerate fluid removal with HD due to hypotension, transfer to ICU for CVVH, d/w nephrology  -consult pulmonary  -continue inhaled bronchodilators      Acute on chronic systolic CHF, NYHA class II at baseline, IV on admission, s/p AICD. -CXR showed pulmonary edema  -ECHO c/w EF 25% with severe hypokinesis on 2017  -BP meds held due to hypotension  -holding Aldactone and Lasix dose due to ARF  -she is not on ACE inhibitor or ARB due to ARF/CKD.    -monitor I/O's, daily weight  -cardiology following  -was on milrinone on 10/19 briefly but was stopped  -fluid removal as tolerated      Possible sepsis: developed hypotension and hypothermia on 10/20, now recurred  -check lactate, serum cortisol (though has been on prednisone)  -continue vancomycin and cefepime for now (10/20-)  -f/u blood cultures, hasn't made urine for a urine culture    COPD with acute exacerbation seems improved; no evidence of bronchospasm  -continue bronchodilators, steroids being tapered, BiPAP  -completed levofloxacin    SANTOS on CKD stage III: suspected due to diuresis and hypotension leading to ATN. Suspicion for cardiorenal is lower  -renal US without obstruction  -nephrology following - started HD on 10/20    Diarrhea: C diff requested but diarrhea seems to have resolved    Sore mouth: due to bipap/xerostomia; trial of magic mouthwash    Hypokalemia now developing hyperkalemia in the setting of renal failure  -monitor with HD     Hyperphosphatemia: correct with HD    HTN  -holding BP meds     Hx of depression  -continue Zoloft     Morbid obesity     Legs ulcers, chronic venous stasis  -continue local wound care    Discussed code status again with her, she now has heart failure, worsening respiratory failure, and renal failure. She's declining despite aggressive measures. She wants to remain full code for now but asked me to call her . No number available in the chart, will attempt to obtain this from the patient. Addendum: obtained  Sebas's phone number ([phone]) but it went to voicemail immediately and the mailbox is full. Code status: Full code  DVT prophylaxis: sq heparin    Care Plan discussed with: Patient/Family, Nurse and   Disposition: TBD     Hospital Problems  Date Reviewed: 10/14/2017          Codes Class Noted POA    Acute respiratory failure ([facility] [address] ICD-10-CM: J96.00  ICD-9-CM: 518.81  10/14/2017 Unknown        * (Principal)Acute on chronic respiratory failure ([facility] [address] ICD-10-CM: J96.20  ICD-9-CM: 518.84  10/14/2017 Unknown              Vital Signs:    Last 24hrs VS reviewed since prior progress note.  Most recent are:  Visit Vitals    BP (!) 76/51    Pulse 60    Temp (!) 94.4 °F (34.7 °C)    Resp 19    Ht 5' 7\" (1.702 m)    Wt (!) 199 kg (438 lb 11.5 oz)    SpO2 93%    BMI 68.71 kg/m2         Intake/Output Summary (Last 24 hours) at 10/23/17 0909  Last data filed at 10/23/17 0321   Gross per 24 hour   Intake                0 ml   Output              130 ml   Net             -130 ml        Physical Examination:     Gen: NAD, extremely obese  HEENT: BiPAP in place  Neck: supple, thick  Heart: RRR, distant heart sounds, no MRG appreciated, unable to assess JVD, 1+ BLE edema/lymphedema  Lungs: diminished breath sounds globally, normal work of breathing on bipap  Abd: soft, NT, ND, BS+, unable to assess organomegaly  Extr: warm, b/l LE lymphedema  Skin: LE superficial skin wounds dry, chronic venous stasis dermatitis of the lower extremities  Neuro: CN II-XII grossly intact, lethargic  Psych: calm, not anxious nor agitated       Data Review:    Review and/or order of clinical lab test      Labs:     Recent Labs      10/23/17   0330  10/22/17   0535   WBC  9.3  8.9   HGB  13.0  12.2   HCT  39.0  35.1   PLT  93*  125*     Recent Labs      10/23/17   0330  10/22/17   0535  10/21/17   0322   NA  131*  133*  133*   K  5.6*  5.3*  5.2*   CL  90*  91*  87*   CO2  26  29  33*   BUN  125*  104*  117*   CREA  6.17*  5.22*  5.08*   GLU  93  120*  127*   CA  8.6  8.4*  8.3*   MG  2.6*  2.5*  2.5*   PHOS  7.4*  6.2*  6.4*     No results for input(s): SGOT, GPT, ALT, AP, TBIL, TBILI, TP, ALB, GLOB, GGT, AML, LPSE in the last 72 hours. No lab exists for component: AMYP, HLPSE  No results for input(s): INR, PTP, APTT in the last 72 hours. No lab exists for component: INREXT, INREXT   No results for input(s): FE, TIBC, PSAT, FERR in the last 72 hours. No results found for: FOL, RBCF   No results for input(s): PH, PCO2, PO2 in the last 72 hours. No results for input(s): CPK, CKNDX, TROIQ in the last 72 hours.     No lab exists for component: CPKMB  No results found for: CHOL, CHOLX, CHLST, CHOLV, HDL, LDL, LDLC, DLDLP, TGLX, TRIGL, TRIGP, CHHD, CHHDX  Lab Results   Component Value Date/Time    Glucose (POC) 87 10/23/2017 05:41 AM    Glucose (POC) 124 09/15/2017 09:56 PM     No results found for: COLOR, APPRN, SPGRU, REFSG, KARISSA, PROTU, GLUCU, KETU, BILU, UROU, CONSUELO, LEUKU, GLUKE, EPSU, BACTU, WBCU, RBCU, CASTS, UCRY      Medications Reviewed:     Current Facility-Administered Medications   Medication Dose Route Frequency    predniSONE (DELTASONE) tablet 20 mg  20 mg Oral DAILY WITH BREAKFAST    magic mouthwash cpd (without sucralfate)  5 mL Oral TIDAC    lactobac ac& pc-s.therm-b.anim (TRACY Q/RISAQUAD)  1 Cap Oral DAILY    vancomycin - pharmacy to dose    Other Rx Dosing/Monitoring    cefepime (MAXIPIME) 2 g in 0.9% sodium chloride (MBP/ADV) 100 mL  2 g IntraVENous Q24H    albuterol-ipratropium (DUO-NEB) 2.5 MG-0.5 MG/3 ML  3 mL Nebulization BID RT    calcium carbonate (TUMS) chewable tablet 200 mg [elemental]  200 mg Oral PRN    influenza vaccine 2017-18 (3 yrs+)(PF) (FLUZONE QUAD/FLUARIX QUAD) injection 0.5 mL  0.5 mL IntraMUSCular PRIOR TO DISCHARGE    albuterol-ipratropium (DUO-NEB) 2.5 MG-0.5 MG/3 ML  3 mL Nebulization Q4H PRN    aspirin delayed-release tablet 81 mg  81 mg Oral DAILY    budesonide (PULMICORT) 500 mcg/2 ml nebulizer suspension  500 mcg Nebulization BID RT    fenofibrate nanocrystallized (TRICOR) tablet 48 mg  48 mg Oral DAILY    sertraline (ZOLOFT) tablet 100 mg  100 mg Oral DAILY    sodium chloride (NS) flush 5-10 mL  5-10 mL IntraVENous Q8H    sodium chloride (NS) flush 5-10 mL  5-10 mL IntraVENous PRN    ondansetron (ZOFRAN) injection 4 mg  4 mg IntraVENous Q4H PRN    heparin (porcine) injection 5,000 Units  5,000 Units SubCUTAneous Q8H     ______________________________________________________________________  EXPECTED LENGTH OF STAY: 4d 12h  ACTUAL LENGTH OF STAY:          9                 Gabby Gilmore MD

## 2017-10-23 NOTE — PROGRESS NOTES
NUTRITION COMPLETE ASSESSMENT    RECOMMENDATIONS:   1. If unable to improve oral intake, may need to consider NJ for nutrition support vs central line for TPN (see goal recs below prn)    2. Continue daily weights     Interventions/Plan:   Food/Nutrient Delivery:  Ensure Enlive BID to optimize limited po intake with increased bipap needs. Will increase prn. Assessment:   Reason for Assessment:   [x] LOS/At Risk     Diet: Cardiac  Nutritionally Significant Medications: [x] Reviewed & Includes: tums 200 mg prn; magic mouthwash TID; zofran q 4 hours prn; vancomycin; floraQ daily   Meal Intake: Patient Vitals for the past 100 hrs:   % Diet Eaten   10/19/17 2000 5 %     Subjective:  Pt on bipap-- she admits her appetite has been poor and that she usually eats well. She then started dry heaving and stated, \"I'm sick\". Respiratory therapist in to assist pt and RN to pull anti-emetic. Objective:  Chart reviewed, discussed with RN and team during interdisciplinary rounds. Pt admitted with acute on chronic respiratory failure. PMHx: chronic respiratory failure, COPD, morbid obesity, CKD, CHF, others noted. Pt typically eats well, but intake has been minimal over the past 5 days. She has high O2 requirements and has been frequently on bipap. Plan is for transfer to ICU for CVVH and potential need for pressors. Palliative Care is following as well. Her prognosis is poor, but pt prefers to continue full code and all measures. Did not discuss nutrition support; however, she is at risk for nutrition decline and worsening third spacing in the setting of limited po and multisystem organ dysfunction. If pt unable to come off bipap, could consider NJ feedings? Goal of 70 mL/hr + 100 mL flush q 4 hours + 1 pkt liquid Prosource daily (2580 kcal, 122 g protein). If unable to feed enterally, and aggressive measures to continue, consider central line for TPN.  Goal would be 5%AA D20 @ 100 mL/hr + 20% lipids, 500 mL 3x/week. This would provide a daily average of 2541 kcal, 120 g protein in 2400 mL-- meeting 100% of estimated needs. Will add Ensure Enlive (700 kcal, 40 g protein)-- 29% and 33% of estimated kcal and protein needs, respectively. Estimated Nutrition Needs:   Kcals/day: 2388 Kcals/day (4614-9583 kcal/day (12-15 kcal/kg))  Protein: 120 g (~20% of kcal needs)  Fluid: 3000 ml (~1 mL/kcal or per renal)     Based On: Kcal/kg - specify (Comment)  Weight Used: Actual wt (199 kg)    Pt expected to meet estimated nutrient needs:  [x]   Yes      Nutrition Diagnosis:   1. Inadequate protein-energy intake related to SOB and anxiety as evidenced by 0-5% of meals consumed over the past 5+ days    Goals:     Pt to consume at least 1 supplement per day over the next 5-7 days     Monitoring & Evaluation:    - Total energy intake, Liquid meal replacement   - Weight/weight change     Previous Nutrition Goals Met:  N/A  Previous Recommendations:      N/A    Education & Discharge Needs:   [x] None Identified   [] Identified and addressed    [x] Participated in care plan, discharge planning, and/or interdisciplinary rounds        Cultural, Sikhism and ethnic food preferences identified:  NONE      Skin Integrity: [x]Intact  []Other  Edema: []None [x]Other: 2-3+ pitting  Last BM: 10/21/17  Food Allergies: [x]None []Other    Anthropometrics:    Weight Loss Metrics 10/23/2017 9/20/2017   Today's Wt 438 lb 11.5 oz 443 lb 5.5 oz   BMI 68.71 kg/m2 69.44 kg/m2      Last 3 Recorded Weights in this Encounter    10/21/17 0421 10/22/17 0418 10/23/17 0322   Weight: (!) 209.6 kg (462 lb 1.4 oz) (!) 189 kg (416 lb 10.7 oz) (!) 199 kg (438 lb 11.5 oz)      Weight Source: Bed  Height: 5' 7\" (170.2 cm),    Body mass index is 68.71 kg/(m^2).   IBW : 61.2 kg (135 lb),    Usual Body Weight: 200.9 kg (443 lb),      Labs:  Lab Results   Component Value Date/Time    Sodium 131 10/23/2017 03:30 AM    Potassium 5.6 10/23/2017 03:30 AM    Chloride 90 10/23/2017 03:30 AM    CO2 26 10/23/2017 03:30 AM    Glucose 93 10/23/2017 03:30 AM     10/23/2017 03:30 AM    Creatinine 6.17 10/23/2017 03:30 AM    Calcium 8.6 10/23/2017 03:30 AM    Magnesium 2.6 10/23/2017 03:30 AM    Phosphorus 7.4 10/23/2017 03:30 AM    Albumin 3.1 10/17/2017 03:48 AM     No results found for: HBA1C, HGBE8, QHV4HIBR, SRZ1EHBN  Lab Results   Component Value Date/Time    Glucose 93 10/23/2017 03:30 AM    Glucose (POC) 87 10/23/2017 05:41 AM      Lab Results   Component Value Date/Time    ALT (SGPT) 27 10/17/2017 03:48 AM    AST (SGOT) 17 10/17/2017 03:48 AM    Alk.  phosphatase 35 10/17/2017 03:48 AM    Bilirubin, total 2.9 10/17/2017 03:48 AM      [address]

## 2017-10-23 NOTE — PROGRESS NOTES
0800- Bedside shift change report given to Frances (oncoming nurse) by Zeeshan Phillips RN (offgoing nurse). Report included the following information SBAR, Kardex, ED Summary, Intake/Output, MAR and Recent Results. 0830- Patient receiving albumin, per nightshift RN patient was extremely hypotensive throughout the night, patient received two 250cc bolus and is now receiving Albumin. Patients temperature also 94.0 F, and was placed on a bear hugger during nightshift. MD aware. Upon initial assessment patient on continuous BI-PAP , BP Pts  91/50, patient lethargic but able to answer basic orientation questions. Will continue to monitor closely and re check vitals. 0900- Dr Jose David Ramírez in the room to assess patient, talked to patient about code status and asked patient if she has any family, patient stated she wants her  Cy Aviles to be in contact. Patient gave husbands phone number, MD given information. Patients BP 86/50, T 97.7, Patient requests to be taken off the bear hugger. Per MD obtain Lactic acid and Cortisol lab work. Will continue to monitor. 0008- Patient seen by Pulmonary Mayur SPENCE and Bobby ESQUEDA, requested patient to get a Pressflip. Pulmonary ordered patient to receive Solu-cortef order placed. 0930- Per Laboratory critical lab value Lactic 2.8, Dr Pita Farias notified. No new orders. 1000-MD ordered Central Line placement for patient. 1034- Called Radiology/ IR to request Central line placement. Per IR will call back once confirmed with [facility]  1100- Per IR, [facility] will do the central line at the bedside in ICU, Notified ICU RN and gave report on the patient, per RN request to get the consent forms signed. 1110- Attempted to explain to the patient she will be getting a central line and is being transferred to the ICU, patient verbalized \"ok\", continues to be hypotensive and on BI-PAP.    1145- Respiratory in the room getting patient ready for transfer, tried to wake patient up, patient not responding, eyes closed, attempted to arouse by calling pts name, touching her arms, still no response, called a RRT, another RN entered the room, attempted sternal rub twice with no response. Patients 02 sats on Bi-Pap in the low 80s. Pt pulseless and unresponsive, skin cyanotic. 1148- Code blue initiated. MD, RRT and CCU/ICU, respiratory and Nurse managers in the room. 1200- TOD called by physician  21 - [facility] contacted, patient not a candidate. Record of death paperwork completed. 1245- Attempted to contact family members with no success, patients  Brandon phone went straight to voicemail, inbox full.

## 2017-10-23 NOTE — PROGRESS NOTES
Code blue called at 11:48 for unresponsiveness. The patient was noted to be pulseless. Skin appeared mottled and she was cyanotic. CPR started per ACLS protocol, initial rhythm PEA. Second rhythm check after 1 mg epinephrine showed V-Tach. 1 shock given as well as 300 mg IV amiodarone. Subsequent rhythm checks back to PEA. She had a transient dopplerable pulse (never palpable) but this quickly disappeared. Copious blood noted in oropharynx as well, suctioned. Unable to obtain ROSC again. Time of death 12:00 PM. Attempted to call both contacts in the chart which went to voicemail. 's number goes to voicemail and is full; unable to leave a message.

## 2017-10-23 NOTE — PROGRESS NOTES
1130- TRANSFER - IN REPORT:    Verbal report received from [address] (name) on Monica Parlor  being received from [facility]) for change in patient condition(resp. distress)      Report consisted of patients Situation, Background, Assessment and   Recommendations(SBAR). Information from the following report(s) SBAR, Kardex, ED Summary, Intake/Output, MAR, Accordion, Recent Results, Med Rec Status and Alarm Parameters  was reviewed with the receiving nurse. Opportunity for questions and clarification was provided. Assessment completed upon patients arrival to unit and care assumed.

## 2017-10-23 NOTE — PROGRESS NOTES
0036 BP 76/46. Paged hospitalist. 250 bolus ordered and given. 0118 Rechecked BP 81/53. re-paged hospitalist. Additional 250 bolus ordered. 0149 Rechecked BP 90/53. Will continue to monitor. 0322 temp 94. 0. hospilatlist paged. Pt on sam diann. 0954 checked BP 76/51. Paged hospitalist. 250 bolus ordered. 0615 orders for albumin received. Will continue to monitor. Bedside shift change report given to Tamia Masters RN (oncoming nurse) by Arash Pham RN (offgoing nurse). Report included the following information SBAR, Kardex, MAR, Accordion, Recent Results, Med Rec Status and Cardiac Rhythm PAced.

## 2017-10-23 NOTE — PROGRESS NOTES
Palliative Medicine:    Pt  early today during code blue ~noon. I had tried to reach sister Belen Estrada ([phone]) but had to leave voicemail. Dr Alexander Matthews tried to reach all family members listed in demographics incl  Denise Waldrop, but could not reach anyone. [place] called me back just now, I told her of her sister's passing. She was understandably tearful. My condolences given. She said the hospital can call her if there are questions about arrangements, etc- as we have not been able to reach  on the number pt provided  ([phone]) . [facility] and chaplains also aware.

## 2017-10-23 NOTE — PROGRESS NOTES
Chart reviewed, patient being transferred to ICU due to decline in medical status. Will f/u tomorrow for re-evaluation pending medical clearance.  Gabriela Perez M.S., OTR/L

## 2017-10-23 NOTE — PROGRESS NOTES
Problem: Falls - Risk of  Goal: *Absence of Falls  Document Nas Fall Risk and appropriate interventions in the flowsheet. Pt remains free of falls during admission. Call bell and frequently used items within reach. Bedside table within reach. Patient provided non skid socks and instructed to call out for nurse when in need of assistance. Problem: Breathing Pattern - Ineffective  Goal: *Absence of hypoxia  Outcome: Not Progressing Towards Goal  Pt not tolerating NC or hi flow. Pt on continuous BIPAP.

## 2017-10-23 NOTE — PROGRESS NOTES
Palliative Medicine Consult  [place]: [phone])    Patient Name: Kaden Luis  YOB: 1955    Date of Initial Consult: 10-17-17  Reason for Consult: Care decisions   Requesting Provider: Bud/Brandt (primary cardiologist is Dr Rabia Dozier)  Primary Care Physician: Carroll Mosquera MD      SUMMARY:   Kaden Luis is a 58 y.o. with a past history of COPD on home Trilogy, morbid obesity,CHF (NYHA II, EF 25% 2017), ICD placed at [facility], CKD who was admitted on 10/14/2017 from home with worsening SOB due to pulmonry edema, being diuresed but need to monitor renal function- renal consulted and was started on HD 10/20- no signs of renal recovery yet. Was on milrinone briefly. Pt was hospitalized for similar sx in 2017, went to [facility] and then back home. .     Current medical issues leading to Palliative Medicine involvement include: care decisions. Social:  to [place] and live together, but per pt he has not been physical or emotional support. Has sister [place] and dtr-in-law Desi who are somewhat involved. Her younger son  last year from colon cancer. Another son who is not very involved. PALLIATIVE DIAGNOSES:   1. Shortness of breath on BIPAP- COPD and CHF  2. Debility due to breathing, weight, leg ulcers  3. Anxiety   4. Mult medical issues as outlined above        PLAN:   1. Pt worsening, going to ICU for CVVH and possible pressors. Really has been BIPAP dependent during this stay. 2. Meet w/ pt who is alert, but very fatigued. Earlier this stay has told me she wants all measures and she told Dr Layne Cooks the same- she wishes for CPR/shock/intubation. 3. Attempt to call her  Miguelina Baltazar ([phone]) but goes to a full voicemail. Leave message on [employer] phone. 4. Goals clear for full restorative measures, but given multisystem organ dysfunction and decline am concerned about her ability to make recovery.    5. Communicated plan of care with: Palliative IDT; Elvia FIORE; Dr Duong Adrian / TREATMENT PREFERENCES:   [====Goals of Care====]  GOALS OF CARE:  Patient / health care proxy stated goals: improve breathing       TREATMENT PREFERENCES:   Code Status: Full Code    Advance Care Planning:- to be determined   Advance Care Planning 10/19/2017   Patient's Healthcare Decision Maker is: Legal Next of Kin   Primary Decision Maker Name    Primary Decision Maker Phone Number Dilcia Odell    Primary Decision Maker Relationship to Patient Spouse   Confirm Advance Directive None       Other:    The palliative care team has discussed with patient / health care proxy about goals of care / treatment preferences for patient.  [====Goals of Care====]         HISTORY:         HPI/SUBJECTIVE:    The patient is:   [x] Verbal and participatory  [] Non-participatory due to:     Pt in bed w/ BIPAP and sam hugger in place, hypothermic and hypotensive. Alert but very fatigued and anxious.      Clinical Pain Assessment (nonverbal scale for severity on nonverbal patients):   [++++ Clinical Pain Assessment++++]  [++++Pain Severity++++]: Pain: 0  [++++Pain Character++++]:   [++++Pain Duration++++]:   [++++Pain Effect++++]:   [++++Pain Factors++++]:   [++++Pain Frequency++++]:   [++++Pain Location++++]:   [++++ Clinical Pain Assessment++++]  Duration: for how long has pt been experiencing pain (e.g., 2 days, 1 month, years)  Frequency: how often pain is an issue (e.g., several times per day, once every few days, constant)     FUNCTIONAL ASSESSMENT:     Palliative Performance Scale (PPS):  PPS: 30       PSYCHOSOCIAL/SPIRITUAL SCREENING:     Advance Care Planning:  Advance Care Planning 10/19/2017   Patient's Healthcare Decision Maker is: Legal Next of Kin   Primary Decision Maker Name    Primary Decision Maker Phone Number Dilcia Odell    Primary Decision Maker Relationship to Patient Spouse   Confirm Advance Directive None        Any spiritual / Scientology concerns:  [] Yes /  [x] No    Caregiver Burnout:  [] Yes /  [] No /  [x] No Caregiver Present      Anticipatory grief assessment:   [x] Normal  / [] Maladaptive       ESAS Anxiety: Anxiety: 5    ESAS Depression: Depression: 0        REVIEW OF SYSTEMS:     Positive and pertinent negative findings in ROS are noted above in HPI. The following systems were [x] reviewed / [] unable to be reviewed as noted in HPI  Other findings are noted below. Systems: constitutional, ears/nose/mouth/throat, respiratory, gastrointestinal, genitourinary, musculoskeletal, integumentary, neurologic, psychiatric, endocrine. Positive findings noted below. Modified ESAS Completed by: provider   Fatigue: 8 Drowsiness: 2   Depression: 0 Pain: 0   Anxiety: 5 Nausea: 0   Anorexia: 3 Dyspnea: 4     Constipation: No     Stool Occurrence(s): 3        PHYSICAL EXAM:     From RN flowsheet:  Wt Readings from Last 3 Encounters:   10/23/17 (!) 438 lb 11.5 oz (199 kg)   09/20/17 (!) 443 lb 5.5 oz (201.1 kg)     Blood pressure (!) 76/51, pulse 60, temperature (!) 94.4 °F (34.7 °C), resp. rate 19, height 5' 7\" (1.702 m), weight (!) 438 lb 11.5 oz (199 kg), SpO2 93 %.     Pain Scale 1: Numeric (0 - 10)  Pain Intensity 1: 0                Constitutional: awake, alert, oriented, more fatigued today  Eyes: pupils equal, anicteric  ENMT: no nasal discharge, moist mucous membranes  Respiratory: breathing labored, on BIPAP  Musculoskeletal: LE w/ swelling and wrapped   Skin: warm, dry  Neurologic: following commands, moving all extremities  Psychiatric: full affect, no hallucinations       HISTORY:     Principal Problem:    Acute on chronic respiratory failure ([address] (10/14/2017)    Active Problems:    Acute respiratory failure ([address] (10/14/2017)      Past Medical History:   Diagnosis Date    Chronic obstructive pulmonary disease ([address]     [facility] failure [facility]     Hypertension     Obesity       Past Surgical History:   Procedure Laterality Date    HX PACEMAKER        History reviewed. No pertinent family history. History reviewed, no pertinent family history.   Social History   Substance Use Topics    Smoking status: Former Smoker    Smokeless tobacco: Never Used    Alcohol use No     No Known Allergies   Current Facility-Administered Medications   Medication Dose Route Frequency    hydrocortisone Sod Succ (PF) (SOLU-CORTEF) injection 50 mg  50 mg IntraVENous Q6H    bicarbonate dialysis (PRISMASOL) BG K 4/Ca 2.5 5000 ml solution   Extracorporeal DIALYSIS CONTINUOUS    magic mouthwash cpd (without sucralfate)  5 mL Oral TIDAC    lactobac ac& pc-s.therm-b.anim (TRACY Q/RISAQUAD)  1 Cap Oral DAILY    vancomycin - pharmacy to dose    Other Rx Dosing/Monitoring    cefepime (MAXIPIME) 2 g in 0.9% sodium chloride (MBP/ADV) 100 mL  2 g IntraVENous Q24H    albuterol-ipratropium (DUO-NEB) 2.5 MG-0.5 MG/3 ML  3 mL Nebulization BID RT    calcium carbonate (TUMS) chewable tablet 200 mg [elemental]  200 mg Oral PRN    influenza vaccine 2017-18 (3 yrs+)(PF) (FLUZONE QUAD/FLUARIX QUAD) injection 0.5 mL  0.5 mL IntraMUSCular PRIOR TO DISCHARGE    albuterol-ipratropium (DUO-NEB) 2.5 MG-0.5 MG/3 ML  3 mL Nebulization Q4H PRN    aspirin delayed-release tablet 81 mg  81 mg Oral DAILY    budesonide (PULMICORT) 500 mcg/2 ml nebulizer suspension  500 mcg Nebulization BID RT    fenofibrate nanocrystallized (TRICOR) tablet 48 mg  48 mg Oral DAILY    sertraline (ZOLOFT) tablet 100 mg  100 mg Oral DAILY    sodium chloride (NS) flush 5-10 mL  5-10 mL IntraVENous Q8H    sodium chloride (NS) flush 5-10 mL  5-10 mL IntraVENous PRN    ondansetron (ZOFRAN) injection 4 mg  4 mg IntraVENous Q4H PRN    heparin (porcine) injection 5,000 Units  5,000 Units SubCUTAneous Q8H          LAB AND IMAGING FINDINGS:     Lab Results   Component Value Date/Time    WBC 9.3 10/23/2017 03:30 AM    HGB 13.0 10/23/2017 03:30 AM    PLATELET 93 81/57/3390 03:30 AM     Lab Results   Component Value Date/Time    Sodium 131 10/23/2017 03:30 AM    Potassium 5.6 10/23/2017 03:30 AM    Chloride 90 10/23/2017 03:30 AM    CO2 26 10/23/2017 03:30 AM     10/23/2017 03:30 AM    Creatinine 6.17 10/23/2017 03:30 AM    Calcium 8.6 10/23/2017 03:30 AM    Magnesium 2.6 10/23/2017 03:30 AM    Phosphorus 7.4 10/23/2017 03:30 AM      Lab Results   Component Value Date/Time    AST (SGOT) 17 10/17/2017 03:48 AM    Alk. phosphatase 35 10/17/2017 03:48 AM    Protein, total 6.7 10/17/2017 03:48 AM    Albumin 3.1 10/17/2017 03:48 AM    Globulin 3.6 10/17/2017 03:48 AM     No results found for: INR, PTMR, PTP, PT1, PT2, APTT   No results found for: IRON, FE, TIBC, IBCT, PSAT, FERR   No results found for: PH, PCO2, PO2  No components found for: GLPOC   No results found for: CPK, CKMB             Total time: 25 min   Counseling / coordination time, spent as noted above: 20 min   > 50% counseling / coordination?: yes    Prolonged service was provided for  []30 min   []75 min in face to face time in the presence of the patient, spent as noted above. Time Start:   Time End:   Note: this can only be billed with 13281 (initial) or 05945 (follow up). If multiple start / stop times, list each separately.

## 2017-10-25 LAB
BACTERIA SPEC CULT: NORMAL
SERVICE CMNT-IMP: NORMAL

## 2017-11-20 NOTE — DISCHARGE SUMMARY
Discharge Summary     Patient: Scarlett Madison MRN: [id number]  SSN: [id number]    YOB: 1955  Age: 58 y.o. Sex: female       Admit Date: 10/14/2017    Discharge Date: 10/23/17     Admission Diagnoses: Acute respiratory failure    Discharge Diagnoses:   Cardiac arrest  Acute on chronic hypercapnic respiratory failure  Acute on chronic hypoxic respiratory failure  Acute on chronic systolic heart failure, NYHA IV  COPD with acute exacerbation  Acute renal failure on CKD stage 3  Hypotension  Morbid obesity  Hypokalemia  Hyperphosphatemia  Hypertension  Diarrhea       Discharge Condition:     Hospital Course: Ms. Terry Keller presented with progressive shortness of breath. She has morbid obesity, CHF, CKD, COPD, chronic hypoxic respiratory failure (on 4L O2). Her dyspnea was thought to be multi-factorial (CHF, COPD, TEAGAN/OHS). She was placed on BiPAP and diuresed and treated for bronchospasm with inhaled bronchodilators and steroids. She initially improved but later during her hospital stay her respiratory failure worsened, requiring continuous bipap. She also developed acute renal failure. She developed hypotension and hypothermia; infectious work-up was unrevealing. The patient's prognosis was poor due to her critically ill state and advanced comorbid conditions. Palliative care was consulted. The patient remained a full code. On 10/23 the patient became unresponsive and went into cardiac arrest. Resuscitative efforts were unsuccessful.     Please see the progress notes from 10/23 for hospital course by problem list.    Consults: Cardiology, Nephrology and Pulmonary/Critical Care, palliative care    Significant Diagnostic Studies: see above    Disposition:     Discharge Medications: n/a      Signed By: Rene Ruff MD     2017